# Patient Record
Sex: FEMALE | Race: BLACK OR AFRICAN AMERICAN | NOT HISPANIC OR LATINO | URBAN - NONMETROPOLITAN AREA
[De-identification: names, ages, dates, MRNs, and addresses within clinical notes are randomized per-mention and may not be internally consistent; named-entity substitution may affect disease eponyms.]

---

## 2019-03-07 ENCOUNTER — CLINICAL SUPPORT (OUTPATIENT)
Dept: FAMILY MEDICINE CLINIC | Facility: CLINIC | Age: 24
End: 2019-03-07

## 2019-03-07 VITALS
DIASTOLIC BLOOD PRESSURE: 88 MMHG | WEIGHT: 280 LBS | TEMPERATURE: 98.4 F | SYSTOLIC BLOOD PRESSURE: 130 MMHG | BODY MASS INDEX: 37.93 KG/M2 | OXYGEN SATURATION: 99 % | HEART RATE: 90 BPM | HEIGHT: 72 IN

## 2019-03-07 DIAGNOSIS — Z02.89 ENCOUNTER FOR EXAMINATION REQUIRED BY DEPARTMENT OF TRANSPORTATION (DOT): Primary | ICD-10-CM

## 2019-03-07 PROCEDURE — DOTPHY: Performed by: NURSE PRACTITIONER

## 2019-03-07 NOTE — PROGRESS NOTES
Yazmin Napier is a 23 y.o. female who presents to the office for a DOT Exam. See Federal DOT examination form for details of this visit.

## 2020-08-04 ENCOUNTER — HOSPITAL ENCOUNTER (INPATIENT)
Age: 25
LOS: 3 days | Discharge: HOME | DRG: 751 | End: 2020-08-08
Attending: EMERGENCY MEDICINE | Admitting: PSYCHIATRY & NEUROLOGY
Payer: MEDICAID

## 2020-08-04 DIAGNOSIS — F32.A DEPRESSION, UNSPECIFIED DEPRESSION TYPE: Primary | ICD-10-CM

## 2020-08-04 LAB
ALBUMIN SERPL-MCNC: 3.5 G/DL (ref 3.5–5)
ALBUMIN/GLOB SERPL: 0.8 {RATIO} (ref 1.1–2.2)
ALP SERPL-CCNC: 84 U/L (ref 45–117)
ALT SERPL-CCNC: 22 U/L (ref 12–78)
AMPHET UR QL SCN: NEGATIVE
ANION GAP SERPL CALC-SCNC: 10 MMOL/L (ref 5–15)
APAP SERPL-MCNC: <2 UG/ML (ref 10–30)
APPEARANCE UR: CLEAR
AST SERPL-CCNC: 16 U/L (ref 15–37)
BACTERIA URNS QL MICRO: NEGATIVE /HPF
BARBITURATES UR QL SCN: NEGATIVE
BASOPHILS # BLD: 0 K/UL (ref 0–0.1)
BASOPHILS NFR BLD: 1 % (ref 0–1)
BENZODIAZ UR QL: NEGATIVE
BILIRUB SERPL-MCNC: 0.5 MG/DL (ref 0.2–1)
BILIRUB UR QL: NEGATIVE
BUN SERPL-MCNC: 9 MG/DL (ref 6–20)
BUN/CREAT SERPL: 11 (ref 12–20)
CALCIUM SERPL-MCNC: 9 MG/DL (ref 8.5–10.1)
CANNABINOIDS UR QL SCN: POSITIVE
CHLORIDE SERPL-SCNC: 108 MMOL/L (ref 97–108)
CO2 SERPL-SCNC: 22 MMOL/L (ref 21–32)
COCAINE UR QL SCN: NEGATIVE
COLOR UR: NORMAL
CREAT SERPL-MCNC: 0.79 MG/DL (ref 0.55–1.02)
DIFFERENTIAL METHOD BLD: ABNORMAL
DRUG SCRN COMMENT,DRGCM: ABNORMAL
EOSINOPHIL # BLD: 0.2 K/UL (ref 0–0.4)
EOSINOPHIL NFR BLD: 2 % (ref 0–7)
EPITH CASTS URNS QL MICRO: NORMAL /LPF
ERYTHROCYTE [DISTWIDTH] IN BLOOD BY AUTOMATED COUNT: 17.7 % (ref 11.5–14.5)
ETHANOL SERPL-MCNC: <10 MG/DL
GLOBULIN SER CALC-MCNC: 4.4 G/DL (ref 2–4)
GLUCOSE SERPL-MCNC: 147 MG/DL (ref 65–100)
GLUCOSE UR STRIP.AUTO-MCNC: NEGATIVE MG/DL
HCG UR QL: NEGATIVE
HCT VFR BLD AUTO: 36.9 % (ref 35–47)
HGB BLD-MCNC: 11 G/DL (ref 11.5–16)
HGB UR QL STRIP: NEGATIVE
HYALINE CASTS URNS QL MICRO: NORMAL /LPF (ref 0–5)
IMM GRANULOCYTES # BLD AUTO: 0 K/UL (ref 0–0.04)
IMM GRANULOCYTES NFR BLD AUTO: 0 % (ref 0–0.5)
KETONES UR QL STRIP.AUTO: NEGATIVE MG/DL
LEUKOCYTE ESTERASE UR QL STRIP.AUTO: NEGATIVE
LYMPHOCYTES # BLD: 2.9 K/UL (ref 0.8–3.5)
LYMPHOCYTES NFR BLD: 35 % (ref 12–49)
MCH RBC QN AUTO: 21.2 PG (ref 26–34)
MCHC RBC AUTO-ENTMCNC: 29.8 G/DL (ref 30–36.5)
MCV RBC AUTO: 71.2 FL (ref 80–99)
METHADONE UR QL: NEGATIVE
MONOCYTES # BLD: 0.4 K/UL (ref 0–1)
MONOCYTES NFR BLD: 5 % (ref 5–13)
NEUTS SEG # BLD: 4.8 K/UL (ref 1.8–8)
NEUTS SEG NFR BLD: 57 % (ref 32–75)
NITRITE UR QL STRIP.AUTO: NEGATIVE
NRBC # BLD: 0 K/UL (ref 0–0.01)
NRBC BLD-RTO: 0 PER 100 WBC
OPIATES UR QL: NEGATIVE
PCP UR QL: NEGATIVE
PH UR STRIP: 5 [PH] (ref 5–8)
PLATELET # BLD AUTO: 234 K/UL (ref 150–400)
POTASSIUM SERPL-SCNC: 3.6 MMOL/L (ref 3.5–5.1)
PROT SERPL-MCNC: 7.9 G/DL (ref 6.4–8.2)
PROT UR STRIP-MCNC: NEGATIVE MG/DL
RBC # BLD AUTO: 5.18 M/UL (ref 3.8–5.2)
RBC #/AREA URNS HPF: NORMAL /HPF (ref 0–5)
SALICYLATES SERPL-MCNC: <1.7 MG/DL (ref 2.8–20)
SODIUM SERPL-SCNC: 140 MMOL/L (ref 136–145)
SP GR UR REFRACTOMETRY: 1.02 (ref 1–1.03)
UR CULT HOLD, URHOLD: NORMAL
UROBILINOGEN UR QL STRIP.AUTO: 0.2 EU/DL (ref 0.2–1)
WBC # BLD AUTO: 8.4 K/UL (ref 3.6–11)
WBC URNS QL MICRO: NORMAL /HPF (ref 0–4)

## 2020-08-04 PROCEDURE — 36415 COLL VENOUS BLD VENIPUNCTURE: CPT

## 2020-08-04 PROCEDURE — 80053 COMPREHEN METABOLIC PANEL: CPT

## 2020-08-04 PROCEDURE — 85025 COMPLETE CBC W/AUTO DIFF WBC: CPT

## 2020-08-04 PROCEDURE — 81001 URINALYSIS AUTO W/SCOPE: CPT

## 2020-08-04 PROCEDURE — 99282 EMERGENCY DEPT VISIT SF MDM: CPT

## 2020-08-04 PROCEDURE — 80307 DRUG TEST PRSMV CHEM ANLYZR: CPT

## 2020-08-04 PROCEDURE — 81025 URINE PREGNANCY TEST: CPT

## 2020-08-04 NOTE — ED TRIAGE NOTES
Arrives with cc of thoughts of SI with no plan. Arrives accompanied with her mother. States she suffered from depression during her pregnancy and is concerned for post partum depression. Has 8 mo old. Denies HI. Denies wanting to harm her baby. Voluntary. Contracts to safety while in ED. Hx of 2 SI attempt with OD 2009 and 2013    Hx of depression in High school and took Celexa, Risperdal and adderall. currently not on any medications.

## 2020-08-04 NOTE — ED PROVIDER NOTES
31year old female presenting for depression. Pt notes that she had her son 5 months ago. \"I guess that I'm just going through postpartum, I had depression before, I think it's a combination of the two. \"  Called her GYN today and was written for a prescription for celexa and was referred to the ED. Denies any plan. Vague suicidal thoughts. PMHx: ADHD, depression  PSx: c-secion x 1, I&D of  incision  Social: denies drug use or alcohol use.  + tobacco and marijuan use. The history is provided by the patient. Mental Health Problem           Past Medical History:   Diagnosis Date    ADHD (attention deficit hyperactivity disorder)     Depression     Mood disorder ([facility] [address]     Second hand smoke exposure        Past Surgical History:   Procedure Laterality Date    HX GYN           History reviewed. No pertinent family history.     Social History     Socioeconomic History    Marital status: SINGLE     Spouse name: Not on file    Number of children: Not on file    Years of education: Not on file    Highest education level: Not on file   Occupational History    Not on file   Social Needs    Financial resource strain: Not on file    Food insecurity     Worry: Not on file     Inability: Not on file    Transportation needs     Medical: Not on file     Non-medical: Not on file   Tobacco Use    Smoking status: Current Every Day Smoker     Packs/day: 0.25    Smokeless tobacco: Never Used   Substance and Sexual Activity    Alcohol use: Not Currently     Comment: socially    Drug use: Yes     Types: Marijuana    Sexual activity: Yes   Lifestyle    Physical activity     Days per week: Not on file     Minutes per session: Not on file    Stress: Not on file   Relationships    Social connections     Talks on phone: Not on file     Gets together: Not on file     Attends Shinto service: Not on file     Active member of club or organization: Not on file     Attends meetings of clubs or organizations: Not on file     Relationship status: Not on file    Intimate partner violence     Fear of current or ex partner: Not on file     Emotionally abused: Not on file     Physically abused: Not on file     Forced sexual activity: Not on file   Other Topics Concern    Not on file   Social History Narrative    Not on file         ALLERGIES: Patient has no known allergies. Review of Systems   Constitutional: Negative for fever. HENT: Negative for facial swelling. Respiratory: Negative for shortness of breath. Cardiovascular: Negative for chest pain. Gastrointestinal: Negative for vomiting. Skin: Negative for wound. Neurological: Negative for syncope. Psychiatric/Behavioral: Positive for dysphoric mood and suicidal ideas. All other systems reviewed and are negative. Vitals:    08/04/20 1830   BP: 120/65   Pulse: 85   Resp: 16   Temp: 98.5 °F (36.9 °C)   SpO2: 99%   Weight: 136.5 kg (300 lb 14.9 oz)   Height: 6' (1.829 m)            Physical Exam  Vitals signs and nursing note reviewed. Constitutional:       General: She is not in acute distress. Appearance: She is well-developed. Comments: Pleasant black female in no distress   HENT:      Right Ear: External ear normal.      Left Ear: External ear normal.   Eyes:      Pupils: Pupils are equal, round, and reactive to light. Neck:      Musculoskeletal: Normal range of motion and neck supple. Cardiovascular:      Rate and Rhythm: Normal rate and regular rhythm. Heart sounds: Normal heart sounds. No murmur. No friction rub. No gallop. Pulmonary:      Effort: Pulmonary effort is normal. No respiratory distress. Breath sounds: Normal breath sounds. No wheezing. Abdominal:      Palpations: Abdomen is soft. Tenderness: There is no abdominal tenderness. There is no guarding. Musculoskeletal: Normal range of motion. Skin:     General: Skin is warm and dry.    Neurological:      Mental Status: She is alert.          MDM  Number of Diagnoses or Management Options  Depression, unspecified depression type:   Diagnosis management comments: 58-year-old female history of depression, 5 months postpartum presenting to the ED for worsening depression with vague suicidal thoughts without plan. Patient seen by counselor, plan to admit patient to psychiatry. No medical complaints.        Amount and/or Complexity of Data Reviewed  Clinical lab tests: ordered and reviewed  Discuss the patient with other providers: yes (Dr. Bundy Sees ED attending)           Procedures

## 2020-08-04 NOTE — ED NOTES
YVETTE has spoken with pt. Per YVETTE, ARCELIA has prescribed Celexa today. Pt explained to pt that she has taken Celexa in the past which has caused her to be drowsy. Requesting a different medication.

## 2020-08-05 LAB
SARS-COV-2, COV2: NOT DETECTED
SOURCE, COVRS: NORMAL
SPECIMEN SOURCE, FCOV2M: NORMAL

## 2020-08-05 PROCEDURE — 65220000003 HC RM SEMIPRIVATE PSYCH

## 2020-08-05 PROCEDURE — 87635 SARS-COV-2 COVID-19 AMP PRB: CPT

## 2020-08-05 RX ORDER — ADHESIVE BANDAGE
30 BANDAGE TOPICAL DAILY PRN
Status: DISCONTINUED | OUTPATIENT
Start: 2020-08-05 | End: 2020-08-08 | Stop reason: HOSPADM

## 2020-08-05 RX ORDER — BENZTROPINE MESYLATE 1 MG/1
1 TABLET ORAL
Status: DISCONTINUED | OUTPATIENT
Start: 2020-08-05 | End: 2020-08-08 | Stop reason: HOSPADM

## 2020-08-05 RX ORDER — OLANZAPINE 5 MG/1
5 TABLET ORAL
Status: DISCONTINUED | OUTPATIENT
Start: 2020-08-05 | End: 2020-08-08 | Stop reason: HOSPADM

## 2020-08-05 RX ORDER — LORAZEPAM 2 MG/ML
1 INJECTION INTRAMUSCULAR
Status: DISCONTINUED | OUTPATIENT
Start: 2020-08-05 | End: 2020-08-08 | Stop reason: HOSPADM

## 2020-08-05 RX ORDER — ACETAMINOPHEN 325 MG/1
650 TABLET ORAL
Status: DISCONTINUED | OUTPATIENT
Start: 2020-08-05 | End: 2020-08-08 | Stop reason: HOSPADM

## 2020-08-05 RX ORDER — DIPHENHYDRAMINE HYDROCHLORIDE 50 MG/ML
50 INJECTION, SOLUTION INTRAMUSCULAR; INTRAVENOUS
Status: DISCONTINUED | OUTPATIENT
Start: 2020-08-05 | End: 2020-08-08 | Stop reason: HOSPADM

## 2020-08-05 RX ORDER — TRAZODONE HYDROCHLORIDE 50 MG/1
50 TABLET ORAL
Status: DISCONTINUED | OUTPATIENT
Start: 2020-08-05 | End: 2020-08-08 | Stop reason: HOSPADM

## 2020-08-05 RX ORDER — HYDROXYZINE 50 MG/1
50 TABLET, FILM COATED ORAL
Status: DISCONTINUED | OUTPATIENT
Start: 2020-08-05 | End: 2020-08-08 | Stop reason: HOSPADM

## 2020-08-05 RX ORDER — HALOPERIDOL 5 MG/ML
5 INJECTION INTRAMUSCULAR
Status: DISCONTINUED | OUTPATIENT
Start: 2020-08-05 | End: 2020-08-08 | Stop reason: HOSPADM

## 2020-08-05 NOTE — ROUTINE PROCESS
TRANSFER - OUT REPORT: 
 
Verbal report given to [facility] RN(name) on [address]  being transferred to 729(unit) for routine progression of care Report consisted of patients Situation, Background, Assessment and  
Recommendations(SBAR). Information from the following report(s) SBAR, Kardex, ED Summary and Recent Results was reviewed with the receiving nurse. Lines:    
 
Opportunity for questions and clarification was provided. Patient transported with: 
 Registered Nurse and HPD

## 2020-08-05 NOTE — BSMART NOTE
Pt is a 32 yo female with a 11 month old son named Sol Jacobson. Pt states her son is with the aunt and mother. Pt presented to the ER stating she is depressed/SI with no plan. Pt stated she \" feels like it would be better if she wasn't her\". Pt states she called her OB/GYN today, Dr. Mia Campbell, who prescribed Celexa for her. Pt states she did not fill the prescription. Pt states she does not want to take it because she has taken it before in high school and it made her drowsy. Pt states she has an appt at [facility] on August 27 with a psychiatrist.  This writer asked if she called the OB/GYN back to discuss and pt stated no, seemed like the doctor was too busy. Pt wanted MD to write for another antidepressant. After MD spoke to pt again, pt agreed to a voluntary admission in order to start a new medication. Lavern Bangura, SW Supervisee in Social Work

## 2020-08-05 NOTE — ED NOTES
Pt updated with plan of care. Notified that admission is pending and that she will be swabbed for covid 19. States understanding.

## 2020-08-05 NOTE — BH NOTES
TRANSFER - IN REPORT:    Verbal report received from Angie(name) on Pooja Sanchez  being received from ED(unit) for routine progression of care      Report consisted of patients Situation, Background, Assessment and   Recommendations(SBAR). Information from the following report(s) SBAR was reviewed with the receiving nurse. Opportunity for questions and clarification was provided. Assessment completed upon patients arrival to unit and care assumed.

## 2020-08-05 NOTE — BH NOTES
Family/friend brought in 9 underwear, 5 pants, 8 shirts, and a book. Clothing and book given to patient. Black gym bag with female products and ambulance linens placed in closet.

## 2020-08-05 NOTE — BH NOTES
PSYCHOSOCIAL ASSESSMENT  :Patient identifying info:  Tommy Arias is a 31 y.o., female admitted 8/4/2020  8:02 PM     Presenting problem and precipitating factors: Pt is admitted into the ER voluntarily for SI with no plan, and depression. She is concerned about post-partum depression. She is a mother to a 11 month old son, who is currently being taken care of by her aunt and mother. Currently, pt lives with mother and grandmother, but has been staying with aunt because she has air conditioning. Mental status assessment: Pt appears alert, oriented, calm and in a pleasant mood. Answered all questions confidently. Pt expressed feeling overwhelmed with motherhood. No SI present. Strengths: Pt came voluntarily to the ER, she has a stable home to return to, and family to take care of her child. Collateral information: Yariel Stuart, [phone]    Current psychiatric /substance abuse providers and contact info: TREVA Corrigan prescribed her Celexa. Appointment with [facility] on August 27 with a psychiatrist.     Previous psychiatric/substance abuse providers and response to treatment: Previous overdose hx, 2009 and 2013 at [facility]. Family history of mental illness or substance abuse: None noted. Substance abuse history: Positive for THC   Social History     Tobacco Use    Smoking status: Current Every Day Smoker     Packs/day: 0.25    Smokeless tobacco: Never Used   Substance Use Topics    Alcohol use: Not Currently     Comment: socially       History of biomedical complications associated with substance abuse: None noted. Patient's current acceptance of treatment or motivation for change: Pt agreed to voluntary admission. Family constellation: Pt has a 11 month old baby, is single, and lives with mother and grandmother. Aunt is also present in her life. Is significant other involved? No     Describe support system: Pt mother.      Describe living arrangements and home environment: Pt lives with mother and grandmother, but has been staying with aunt because she has air conditioning. Health issues:   Hospital Problems  Date Reviewed: 2015          Codes Class Noted POA    Depression complicating pregnancy, postpartum ICD-10-CM: O99.345, F53.0  ICD-9-CM: 648.44, 311  2020 Unknown              Trauma history: None noted. Legal issues: None noted. History of  service: None noted. Financial status: Unemployed, family supports her. Scientologist/cultural factors: None noted. Education/work history: Completed the 12th grade, high school.      Have you been licensed as a health care professional (current or ):     Leisure and recreation preferences: Unknown     Describe coping skills: ineffectual     Darien Vela  2020
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
## 2020-08-05 NOTE — ED NOTES
Report received from  [employer], [address] [place] [address]. Pt resting comfortably on chair watching tv. No changes in assessment noted since previous assessment.

## 2020-08-05 NOTE — ED NOTES
Assumed care of patient. Resting quietly. Woke easily as RN approached. Denies any medical complaints. Calm, cooperative. Awaiting COVID test results for patient to be admitted here at [facility].

## 2020-08-05 NOTE — ED NOTES
Pt has decided to stay. Provided with crackers, ginger ale and water per request. Offered hallway stretcher, pt declined, stating she'd be comfortable with an extra blanket and pillow. Sachse and pillow provided.

## 2020-08-05 NOTE — ED NOTES
Pt requesting to leave. Aware that covid swab needed for admission and that her swab has priority. PA notified.

## 2020-08-06 LAB
CHOLEST SERPL-MCNC: 169 MG/DL
HDLC SERPL-MCNC: 47 MG/DL
HDLC SERPL: 3.6 {RATIO} (ref 0–5)
LDLC SERPL CALC-MCNC: 101.4 MG/DL (ref 0–100)
LIPID PROFILE,FLP: ABNORMAL
TRIGL SERPL-MCNC: 103 MG/DL (ref ?–150)
TSH SERPL DL<=0.05 MIU/L-ACNC: 0.54 UIU/ML (ref 0.36–3.74)
VLDLC SERPL CALC-MCNC: 20.6 MG/DL

## 2020-08-06 PROCEDURE — 74011250637 HC RX REV CODE- 250/637: Performed by: NURSE PRACTITIONER

## 2020-08-06 PROCEDURE — 36415 COLL VENOUS BLD VENIPUNCTURE: CPT

## 2020-08-06 PROCEDURE — 65220000003 HC RM SEMIPRIVATE PSYCH

## 2020-08-06 PROCEDURE — 84443 ASSAY THYROID STIM HORMONE: CPT

## 2020-08-06 PROCEDURE — 80061 LIPID PANEL: CPT

## 2020-08-06 RX ORDER — BUPROPION HYDROCHLORIDE 100 MG/1
100 TABLET, EXTENDED RELEASE ORAL DAILY
Status: DISCONTINUED | OUTPATIENT
Start: 2020-08-06 | End: 2020-08-08 | Stop reason: HOSPADM

## 2020-08-06 RX ADMIN — BUPROPION HYDROCHLORIDE 100 MG: 100 TABLET, EXTENDED RELEASE ORAL at 11:23

## 2020-08-06 NOTE — PROGRESS NOTES
Problem: Discharge Planning  Goal: *Discharge to safe environment  Outcome: Progressing Towards Goal  Note: Pt has a safe home to return to and supportive family in and outside of the home. Goal: *Knowledge of medication management  Outcome: Progressing Towards Goal  Note: Pt agrees to take medication as prescribed. Goal: *Knowledge of discharge instructions  Outcome: Progressing Towards Goal  Note: Pt verbalized understanding of goals for tx and discharge.

## 2020-08-06 NOTE — H&P
[facility] HISTORY AND PHYSICAL    Name:  Sameer Hannon  MR#:  [[id number]]  :  1995  ACCOUNT #:  [de-identified]  ADMIT DATE:  2020    INITIAL PSYCHIATRIC EVALUATION    DATE OF SERVICE:  2020    CHIEF COMPLAINT:  \"I have been feeling really depressed. \"    HISTORY OF PRESENT ILLNESS:  The patient is a 58-year-old female who is currently admitted at [address] on a voluntary basis. She stated she has suffered from depression back when she was in high school. She was put on Celexa, Adderall and risperidone. She shared that she was admitted at [facility] twice after she overdosed on both occasions. She spoke to her OB/GYN a few days ago, and she was prescribed Celexa, but she never had a chance to pick it up since her OB recommended for her to be evaluated in the ER. The patient states that her depression back in high school had resolved, but then she started feeling depressed after the birth of her 11month-old son. She shares that she had a traumatic childbirth. She had to go into emergency . She also had to go back to the hospital due to infection in her incision site. She states she has been overwhelmed with psychosocial stressors, trying to become a mother, trying to get a job and trying to support her son. Her motivation has been poor, and her appetite has increased. She states that she was feeling hopeless and helpless to the point that she had developed passive thoughts of suicide. She shares that she would not care if she was not here anymore. Her urine drug screen is positive for THC. She states she smokes THC on weekends. She is supposed to have an appointment with [facility] in 6 months. She currently denies suicidal ideation, homicidal ideation, auditory or visual hallucination.     PAST MEDICAL HISTORY:  See H and P.    Labs: (reviewed/updated 2020)  Patient Vitals for the past 8 hrs: BP Temp Pulse Resp SpO2   08/07/20 0754 120/85 98.6 °F (37 °C) 76 16 97 %     Labs Reviewed   DRUG SCREEN, URINE - Abnormal; Notable for the following components:       Result Value    THC (TH-CANNABINOL) Positive (*)     All other components within normal limits   SALICYLATE - Abnormal; Notable for the following components:    Salicylate level <8.7 (*)     All other components within normal limits   ACETAMINOPHEN - Abnormal; Notable for the following components:    Acetaminophen level <2 (*)     All other components within normal limits   METABOLIC PANEL, COMPREHENSIVE - Abnormal; Notable for the following components:    Glucose 147 (*)     BUN/Creatinine ratio 11 (*)     Globulin 4.4 (*)     A-G Ratio 0.8 (*)     All other components within normal limits   CBC WITH AUTOMATED DIFF - Abnormal; Notable for the following components:    HGB 11.0 (*)     MCV 71.2 (*)     MCH 21.2 (*)     MCHC 29.8 (*)     RDW 17.7 (*)     All other components within normal limits   LIPID PANEL - Abnormal; Notable for the following components:    LDL, calculated 101.4 (*)     All other components within normal limits   URINE CULTURE HOLD SAMPLE   URINALYSIS W/MICROSCOPIC   ETHYL ALCOHOL   SARS-COV-2   TSH 3RD GENERATION   HCG URINE, QL. - POC     Lab Results   Component Value Date/Time    Sodium 140 08/04/2020 09:47 PM    Potassium 3.6 08/04/2020 09:47 PM    Chloride 108 08/04/2020 09:47 PM    CO2 22 08/04/2020 09:47 PM    Anion gap 10 08/04/2020 09:47 PM    Glucose 147 (H) 08/04/2020 09:47 PM    BUN 9 08/04/2020 09:47 PM    Creatinine 0.79 08/04/2020 09:47 PM    BUN/Creatinine ratio 11 (L) 08/04/2020 09:47 PM    GFR est AA >60 08/04/2020 09:47 PM    GFR est non-AA >60 08/04/2020 09:47 PM    Calcium 9.0 08/04/2020 09:47 PM    Bilirubin, total 0.5 08/04/2020 09:47 PM    Alk.  phosphatase 84 08/04/2020 09:47 PM    Protein, total 7.9 08/04/2020 09:47 PM    Albumin 3.5 08/04/2020 09:47 PM    Globulin 4.4 (H) 08/04/2020 09:47 PM    A-G Ratio 0.8 (L) 08/04/2020 09:47 PM    ALT (SGPT) 22 08/04/2020 09:47 PM     Admission on 08/04/2020   Component Date Value Ref Range Status    Pregnancy test,urine (POC) 08/04/2020 Negative  NEG   Final    Urine culture hold 08/04/2020 Urine on hold in Microbiology dept for 2 days. If unpreserved urine is submitted, it cannot be used for addtional testing after 24 hours, recollection will be required.     Final    Color 08/04/2020 YELLOW/STRAW    Final    Appearance 08/04/2020 CLEAR  CLEAR   Final    Specific gravity 08/04/2020 1.017  1.003 - 1.030   Final    pH (UA) 08/04/2020 5.0  5.0 - 8.0   Final    Protein 08/04/2020 Negative  NEG mg/dL Final    Glucose 08/04/2020 Negative  NEG mg/dL Final    Ketone 08/04/2020 Negative  NEG mg/dL Final    Bilirubin 08/04/2020 Negative  NEG   Final    Blood 08/04/2020 Negative  NEG   Final    Urobilinogen 08/04/2020 0.2  0.2 - 1.0 EU/dL Final    Nitrites 08/04/2020 Negative  NEG   Final    Leukocyte Esterase 08/04/2020 Negative  NEG   Final    WBC 08/04/2020 0-4  0 - 4 /hpf Final    RBC 08/04/2020 0-5  0 - 5 /hpf Final    Epithelial cells 08/04/2020 FEW  FEW /lpf Final    Bacteria 08/04/2020 Negative  NEG /hpf Final    Hyaline cast 08/04/2020 0-2  0 - 5 /lpf Final    AMPHETAMINES 08/04/2020 Negative  NEG   Final    BARBITURATES 08/04/2020 Negative  NEG   Final    BENZODIAZEPINES 08/04/2020 Negative  NEG   Final    COCAINE 08/04/2020 Negative  NEG   Final    METHADONE 08/04/2020 Negative  NEG   Final    OPIATES 08/04/2020 Negative  NEG   Final    PCP(PHENCYCLIDINE) 08/04/2020 Negative  NEG   Final    THC (TH-CANNABINOL) 08/04/2020 Positive* NEG   Final    Drug screen comment 08/04/2020 (NOTE)   Final    Salicylate level 91/24/3907 <1.7* 2.8 - 20.0 MG/DL Final    Acetaminophen level 08/04/2020 <2* 10 - 30 ug/mL Final    ALCOHOL(ETHYL),SERUM 08/04/2020 <10  <10 MG/DL Final    Sodium 08/04/2020 140  136 - 145 mmol/L Final    Potassium 08/04/2020 3.6  3.5 - 5.1 mmol/L Final    Chloride 08/04/2020 108  97 - 108 mmol/L Final    CO2 08/04/2020 22  21 - 32 mmol/L Final    Anion gap 08/04/2020 10  5 - 15 mmol/L Final    Glucose 08/04/2020 147* 65 - 100 mg/dL Final    BUN 08/04/2020 9  6 - 20 MG/DL Final    Creatinine 08/04/2020 0.79  0.55 - 1.02 MG/DL Final    BUN/Creatinine ratio 08/04/2020 11* 12 - 20   Final    GFR est AA 08/04/2020 >60  >60 ml/min/1.73m2 Final    GFR est non-AA 08/04/2020 >60  >60 ml/min/1.73m2 Final    Calcium 08/04/2020 9.0  8.5 - 10.1 MG/DL Final    Bilirubin, total 08/04/2020 0.5  0.2 - 1.0 MG/DL Final    ALT (SGPT) 08/04/2020 22  12 - 78 U/L Final    AST (SGOT) 08/04/2020 16  15 - 37 U/L Final    Alk. phosphatase 08/04/2020 84  45 - 117 U/L Final    Protein, total 08/04/2020 7.9  6.4 - 8.2 g/dL Final    Albumin 08/04/2020 3.5  3.5 - 5.0 g/dL Final    Globulin 08/04/2020 4.4* 2.0 - 4.0 g/dL Final    A-G Ratio 08/04/2020 0.8* 1.1 - 2.2   Final    WBC 08/04/2020 8.4  3.6 - 11.0 K/uL Final    RBC 08/04/2020 5.18  3.80 - 5.20 M/uL Final    HGB 08/04/2020 11.0* 11.5 - 16.0 g/dL Final    HCT 08/04/2020 36.9  35.0 - 47.0 % Final    MCV 08/04/2020 71.2* 80.0 - 99.0 FL Final    MCH 08/04/2020 21.2* 26.0 - 34.0 PG Final    MCHC 08/04/2020 29.8* 30.0 - 36.5 g/dL Final    RDW 08/04/2020 17.7* 11.5 - 14.5 % Final    PLATELET 39/50/5884 405  150 - 400 K/uL Final    NRBC 08/04/2020 0.0  0  WBC Final    ABSOLUTE NRBC 08/04/2020 0.00  0.00 - 0.01 K/uL Final    NEUTROPHILS 08/04/2020 57  32 - 75 % Final    LYMPHOCYTES 08/04/2020 35  12 - 49 % Final    MONOCYTES 08/04/2020 5  5 - 13 % Final    EOSINOPHILS 08/04/2020 2  0 - 7 % Final    BASOPHILS 08/04/2020 1  0 - 1 % Final    IMMATURE GRANULOCYTES 08/04/2020 0  0.0 - 0.5 % Final    ABS. NEUTROPHILS 08/04/2020 4.8  1.8 - 8.0 K/UL Final    ABS. LYMPHOCYTES 08/04/2020 2.9  0.8 - 3.5 K/UL Final    ABS. MONOCYTES 08/04/2020 0.4  0.0 - 1.0 K/UL Final    ABS.  EOSINOPHILS 08/04/2020 0.2  0.0 - 0.4 K/UL Final    ABS. BASOPHILS 08/04/2020 0.0  0.0 - 0.1 K/UL Final    ABS. IMM. GRANS. 08/04/2020 0.0  0.00 - 0.04 K/UL Final    DF 08/04/2020 AUTOMATED    Final    Specimen source 08/05/2020 Nasopharyngeal    Final    SARS-CoV-2 08/05/2020 Not detected  NOTD   Final    Specimen source 08/05/2020 Nasopharyngeal    Final    TSH 08/06/2020 0.54  0.36 - 3.74 uIU/mL Final    LIPID PROFILE 08/06/2020        Final    Cholesterol, total 08/06/2020 169  <200 MG/DL Final    Triglyceride 08/06/2020 103  <150 MG/DL Final    HDL Cholesterol 08/06/2020 47  MG/DL Final    LDL, calculated 08/06/2020 101.4* 0 - 100 MG/DL Final    VLDL, calculated 08/06/2020 20.6  MG/DL Final    CHOL/HDL Ratio 08/06/2020 3.6  0.0 - 5.0   Final     Vitals:    08/06/20 1124 08/06/20 1711 08/06/20 1954 08/07/20 0754   BP: 107/76 115/80 112/81 120/85   Pulse: 86 90 85 76   Resp: 16 16 16 16   Temp: 98.1 °F (36.7 °C) 98 °F (36.7 °C) 98 °F (36.7 °C) 98.6 °F (37 °C)   SpO2: 98% 100% 100% 97%   Weight:       Height:         No results found for this or any previous visit (from the past 24 hour(s)). RADIOLOGY REPORTS:  Results from Hospital Encounter encounter on 02/16/14   XR CHEST PA LAT    Narrative **Final Report**       ICD Codes / Adm. Diagnosis: 163633   / Chest Pain    Examination:  CR CHEST PA AND LATERAL  - [id number] - Feb 16 2014  6:49PM  Accession No:  [id number]  Reason:  chest pain      REPORT:  EXAM:  CR CHEST PA AND LATERAL. INDICATION: Chest pain. COMPARISON: None. FINDINGS:  PA and lateral radiographs of the chest were obtained. Lungs: The lungs are clear of mass, nodule, airspace disease or edema. Pleura: There is no pleural effusion or pneumothorax. Mediastinum: The cardiac and mediastinal contours and pulmonary vascularity   are normal.    Bones and soft tissues: The bones and soft tissues are within normal limits.         IMPRESSION: Normal chest.              Signing/Reading Doctor: Cristine Medellin ([id number])    Approved: Cristine Medellin ([id number])  Feb 16 2014  6:56PM                               No results found. PAST PSYCHIATRIC HISTORY:  See above. PSYCHOSOCIAL HISTORY:  She is single. She has a 11month-old son. She lives with her aunt. She is currently unemployed, and her family is financially supporting her. MENTAL STATUS EXAM:  She is alert and oriented in all spheres. She is dressed in hospital apparel. She is calm and pleasant during the interview. She reports her mood is good. Affect is reactive. Speech normal rate and rhythm. Thought process logical and goal-directed. She denies suicidal ideation, homicidal ideation, auditory or visual hallucinations. Memory seems intact. Intelligence seems average. Insight is partial.  Judgment is fair. DIAGNOSES:  Major depressive disorder, recurrent, moderate, without psychotic features. Cannabis use disorder, unspecified. TREATMENT PLAN:  I will continue her inpatient stay. She will be provided with support and encouraged to attend groups. Her safety will be monitored. Her medications will be modified and assessed. Case Management will work on discharge planning. ASSETS AND STRENGTHS:  She is willing to seek help. She is willing to take medications. ESTIMATED LENGTH OF STAY:  5-7 days.       ANTOINETTE NEAL NP      [id number]  D:  08/06/2020 13:30  T:  08/06/2020 15:09  JOB #:  [id number]

## 2020-08-06 NOTE — PROGRESS NOTES
Pt in bed resting quietly with eyes closed. NAD. Respirations equal and unlabored. Will continue to monitor throughout shift q15 minute rounds. Problem: Depressed Mood (Adult/Pediatric)  Goal: *STG: Remains safe in hospital  Outcome: Progressing Towards Goal     Problem: Falls - Risk of  Goal: *Absence of Falls  Description: Document Neftali Fall Risk and appropriate interventions in the flowsheet.   Outcome: Progressing Towards Goal  Note: Fall Risk Interventions:            Medication Interventions: Teach patient to arise slowly

## 2020-08-06 NOTE — PROGRESS NOTES
[facility]  Master Treatment Plan for Zane Litten    Date Treatment Plan Initiated: 8/6/20    Treatment Plan Modalities:  Type of Modality Amount  (x minutes) Frequency (x/week) Duration (x days) Name of Responsible Staff   Community & wrap-up meetings to encourage peer interactions 15 7 1 Sylwia JARVIS     Group psychotherapy to assist in building coping skills and internal controls 60 7 1 Clark Ramos   Therapeutic activity groups to build coping skills 60 7 1 Clark Ramos   Psychoeducation in group setting to address:   Medication education   15 7 1 [facility] skills         Relaxation techniques         Symptom management         Discharge planning   61 2 [facility]    60 2 1 Chaplain JOHN   61 1 1 volunteer   Recovery/AA/NA      volunteer   Physician medication management   13 7 [address] NP   Family meeting/discharge planning   15 2 1 [address] and Selina Guzman                                   Problem: Depressed Mood (Adult/Pediatric)  Goal: *STG: Participates in treatment plan  Outcome: Progressing Towards Goal  Note: Out on unit passively engaged. Mood and affect depressed and sad. Denies SI. Describes hopelessness. Describes social stressors related to lack of finances and primary caregiver of 5 month of infant. Daily goal is to attend group and talk with family.  Staff focus is on medication and coping skills education  Goal: *STG: Verbalizes anger, guilt, and other feelings in a constructive manor  Outcome: Progressing Towards Goal  Goal: *STG: Attends activities and groups  Outcome: Progressing Towards Goal  Goal: *STG: Demonstrates reduction in symptoms and increase in insight into coping skills/future focused  Outcome: Progressing Towards Goal  Goal: *STG: Complies with medication therapy  Outcome: Progressing Towards Goal  Goal: Interventions  Outcome: Progressing Towards Goal

## 2020-08-06 NOTE — INTERDISCIPLINARY ROUNDS
Behavioral Health Interdisciplinary Rounds Patient Name: Adam Hilton  Age: 31 y.o. Room/Bed:  729/ Primary Diagnosis: <principal problem not specified> Admission Status: Voluntary Readmission within 30 days: no 
Power of  in place: no 
Patient requires a blocked bed: no          Reason for blocked bed: VTE Prophylaxis: No 
 
Mobility needs/Fall risk: no 
Flu Vaccine : no  
Nutritional Plan: no 
Consults:         
Labs/Testing due today?: yes Sleep hours: 7.5 Participation in Care/Groups:  
Medication Compliant?: No scheduled medications PRNS (last 24 hours): None Restraints (last 24 hours):  no 
  
CIWA (range last 24 hours): COWS (range last 24 hours): Alcohol screening (AUDIT) completed -   AUDIT Score: 4 If applicable, date SBIRT discussed in treatment team AND documented:  
AUDIT Screen Score: AUDIT Score: 4 Document Brief Intervention (corresponds directly with the 5 A's, Ask, Advise, Assess, Assist, and Arrange): At- Risk Patients (Score 7-15 for women; 8-15 for men) Discuss concern patient is drinking at unhealthy levels known to increase risk of alcohol-related health problems. Is Patient ready to commit to change? If No: 
? Encourage reflection ? Discuss short term and long term health risks of consuming alcohol ? Barriers to change ? Reaffirm willingness to help / Educational materials provided If Yes: 
? Set goal 
? Plan 
? Educational materials provided Harmful use or Dependence (Score 16 or greater) ? Discuss short term and long term health risks of consuming alcohol ? Recommendations ? Negotiate drinking goal 
? Recommend addiction specialist/center ? Arrange follow-up appointments. Tobacco - patient is a smoker: Have You Used Tobacco in the Past 30 Days: Yes Illegal Drugs use: Have You Used Any Illegal Substances Over the Past 12 Months: Yes 
 
24 hour chart check complete: Patient goal(s) for today: meet treatment team, acclimate to unit Treatment team focus/goals: assess needs for treatment and safe discharge. Progress note:  Pt appears alert, oriented and calm. No SI present. Slightly improving. LOS:  1  Expected LOS:  
 
Financial concerns/prescription coverage:  
Family contact:  barrettne Jackelyn Montano [address], [phone] Family requesting physician contact today:  No  
Discharge plan: TBD, pt will return home to son, potential discharge Saturday. Access to weapons : No     
Outpatient provider(s): [facility]? Patient's preferred phone number for follow up call: [phone] Participating treatment team members: RENALDO Diaz; Aisha Heredia RN; Daniel Worthington NP.

## 2020-08-07 PROCEDURE — 65220000003 HC RM SEMIPRIVATE PSYCH

## 2020-08-07 PROCEDURE — 74011250637 HC RX REV CODE- 250/637: Performed by: NURSE PRACTITIONER

## 2020-08-07 RX ORDER — BUPROPION HYDROCHLORIDE 150 MG/1
150 TABLET ORAL DAILY
Qty: 30 TAB | Refills: 0 | Status: ON HOLD | OUTPATIENT
Start: 2020-08-07 | End: 2021-03-24

## 2020-08-07 RX ADMIN — BUPROPION HYDROCHLORIDE 100 MG: 100 TABLET, EXTENDED RELEASE ORAL at 08:38

## 2020-08-07 NOTE — PROGRESS NOTES
Problem: Depressed Mood (Adult/Pediatric)  Goal: *STG: Participates in treatment plan  Outcome: Progressing Towards Goal  Note: Patient participates in treatment plan. Patient is isolative, blunted affect, med and meal compliant, did not attend group. Patient is cooperative, medications discussed.

## 2020-08-07 NOTE — PROGRESS NOTES
Problem: Falls - Risk of  Goal: *Absence of Falls  Description: Document Lucerolynn Frye Fall Risk and appropriate interventions in the flowsheet.   Outcome: Progressing Towards Goal  Note: Fall Risk Interventions:            Medication Interventions: Teach patient to arise slowly

## 2020-08-07 NOTE — DISCHARGE SUMMARY
PSYCHIATRIC DISCHARGE SUMMARY      Patient: Jeffrey Sotomayor MRN: [de-identified]  SSN: [id number]    YOB: 1995  Age: 31 y.o. Sex: female        Date of Admission: 2020  Date of Discharge: 2020     Type of Discharge:  REGULAR    Admission data:  CHIEF COMPLAINT:  \"I have been feeling really depressed. \"     HISTORY OF PRESENT ILLNESS:  The patient is a 18-year-old female who is currently admitted at [address] on a voluntary basis. She stated she has suffered from depression back when she was in high school. She was put on Celexa, Adderall and risperidone. She shared that she was admitted at [facility] twice after she overdosed on both occasions. She spoke to her OB/GYN a few days ago, and she was prescribed Celexa, but she never had a chance to pick it up since her OB recommended for her to be evaluated in the ER. The patient states that her depression back in high school had resolved, but then she started feeling depressed after the birth of her 11month-old son. She shares that she had a traumatic childbirth. She had to go into emergency . She also had to go back to the hospital due to infection in her incision site. She states she has been overwhelmed with psychosocial stressors, trying to become a mother, trying to get a job and trying to support her son. Her motivation has been poor, and her appetite has increased. She states that she was feeling hopeless and helpless to the point that she had developed passive thoughts of suicide. She shares that she would not care if she was not here anymore. Her urine drug screen is positive for THC. She states she smokes THC on weekends. She is supposed to have an appointment with [address] [place] [address] in 6 months.   She currently denies suicidal ideation, homicidal ideation, auditory or visual hallucination.     PAST MEDICAL HISTORY:  See H and P.     Labs: (reviewed/updated 2020)  Patient Vitals for the past 8 hrs:    BP Temp Pulse Resp SpO2   08/07/20 0754 120/85 98.6 °F (37 °C) 76 16 97 %           Labs Reviewed   DRUG SCREEN, URINE - Abnormal; Notable for the following components:       Result Value      THC (TH-CANNABINOL) Positive (*)       All other components within normal limits   SALICYLATE - Abnormal; Notable for the following components:     Salicylate level <5.5 (*)       All other components within normal limits   ACETAMINOPHEN - Abnormal; Notable for the following components:     Acetaminophen level <2 (*)       All other components within normal limits   METABOLIC PANEL, COMPREHENSIVE - Abnormal; Notable for the following components:     Glucose 147 (*)       BUN/Creatinine ratio 11 (*)       Globulin 4.4 (*)       A-G Ratio 0.8 (*)       All other components within normal limits   CBC WITH AUTOMATED DIFF - Abnormal; Notable for the following components:     HGB 11.0 (*)       MCV 71.2 (*)       MCH 21.2 (*)       MCHC 29.8 (*)       RDW 17.7 (*)       All other components within normal limits   LIPID PANEL - Abnormal; Notable for the following components:     LDL, calculated 101.4 (*)       All other components within normal limits   URINE CULTURE HOLD SAMPLE   URINALYSIS W/MICROSCOPIC   ETHYL ALCOHOL   SARS-COV-2   TSH 3RD GENERATION   HCG URINE, QL. - POC           Lab Results   Component Value Date/Time     Sodium 140 08/04/2020 09:47 PM     Potassium 3.6 08/04/2020 09:47 PM     Chloride 108 08/04/2020 09:47 PM     CO2 22 08/04/2020 09:47 PM     Anion gap 10 08/04/2020 09:47 PM     Glucose 147 (H) 08/04/2020 09:47 PM     BUN 9 08/04/2020 09:47 PM     Creatinine 0.79 08/04/2020 09:47 PM     BUN/Creatinine ratio 11 (L) 08/04/2020 09:47 PM     GFR est AA >60 08/04/2020 09:47 PM     GFR est non-AA >60 08/04/2020 09:47 PM     Calcium 9.0 08/04/2020 09:47 PM     Bilirubin, total 0.5 08/04/2020 09:47 PM     Alk.  phosphatase 84 08/04/2020 09:47 PM     Protein, total 7.9 08/04/2020 09:47 PM   Albumin 3.5 08/04/2020 09:47 PM     Globulin 4.4 (H) 08/04/2020 09:47 PM     A-G Ratio 0.8 (L) 08/04/2020 09:47 PM     ALT (SGPT) 22 08/04/2020 09:47 PM             Admission on 08/04/2020   Component Date Value Ref Range Status    Pregnancy test,urine (POC) 08/04/2020 Negative  NEG   Final    Urine culture hold 08/04/2020 Urine on hold in Microbiology dept for 2 days. If unpreserved urine is submitted, it cannot be used for addtional testing after 24 hours, recollection will be required.     Final    Color 08/04/2020 YELLOW/STRAW    Final    Appearance 08/04/2020 CLEAR  CLEAR   Final    Specific gravity 08/04/2020 1.017  1.003 - 1.030   Final    pH (UA) 08/04/2020 5.0  5.0 - 8.0   Final    Protein 08/04/2020 Negative  NEG mg/dL Final    Glucose 08/04/2020 Negative  NEG mg/dL Final    Ketone 08/04/2020 Negative  NEG mg/dL Final    Bilirubin 08/04/2020 Negative  NEG   Final    Blood 08/04/2020 Negative  NEG   Final    Urobilinogen 08/04/2020 0.2  0.2 - 1.0 EU/dL Final    Nitrites 08/04/2020 Negative  NEG   Final    Leukocyte Esterase 08/04/2020 Negative  NEG   Final    WBC 08/04/2020 0-4  0 - 4 /hpf Final    RBC 08/04/2020 0-5  0 - 5 /hpf Final    Epithelial cells 08/04/2020 FEW  FEW /lpf Final    Bacteria 08/04/2020 Negative  NEG /hpf Final    Hyaline cast 08/04/2020 0-2  0 - 5 /lpf Final    AMPHETAMINES 08/04/2020 Negative  NEG   Final    BARBITURATES 08/04/2020 Negative  NEG   Final    BENZODIAZEPINES 08/04/2020 Negative  NEG   Final    COCAINE 08/04/2020 Negative  NEG   Final    METHADONE 08/04/2020 Negative  NEG   Final    OPIATES 08/04/2020 Negative  NEG   Final    PCP(PHENCYCLIDINE) 08/04/2020 Negative  NEG   Final    THC (TH-CANNABINOL) 08/04/2020 Positive* NEG   Final    Drug screen comment 08/04/2020 (NOTE)    Final    Salicylate level 55/19/3101 <1.7* 2.8 - 20.0 MG/DL Final    Acetaminophen level 08/04/2020 <2* 10 - 30 ug/mL Final    ALCOHOL(ETHYL),SERUM 08/04/2020 <10 <10 MG/DL Final    Sodium 08/04/2020 140  136 - 145 mmol/L Final    Potassium 08/04/2020 3.6  3.5 - 5.1 mmol/L Final    Chloride 08/04/2020 108  97 - 108 mmol/L Final    CO2 08/04/2020 22  21 - 32 mmol/L Final    Anion gap 08/04/2020 10  5 - 15 mmol/L Final    Glucose 08/04/2020 147* 65 - 100 mg/dL Final    BUN 08/04/2020 9  6 - 20 MG/DL Final    Creatinine 08/04/2020 0.79  0.55 - 1.02 MG/DL Final    BUN/Creatinine ratio 08/04/2020 11* 12 - 20   Final    GFR est AA 08/04/2020 >60  >60 ml/min/1.73m2 Final    GFR est non-AA 08/04/2020 >60  >60 ml/min/1.73m2 Final    Calcium 08/04/2020 9.0  8.5 - 10.1 MG/DL Final    Bilirubin, total 08/04/2020 0.5  0.2 - 1.0 MG/DL Final    ALT (SGPT) 08/04/2020 22  12 - 78 U/L Final    AST (SGOT) 08/04/2020 16  15 - 37 U/L Final    Alk. phosphatase 08/04/2020 84  45 - 117 U/L Final    Protein, total 08/04/2020 7.9  6.4 - 8.2 g/dL Final    Albumin 08/04/2020 3.5  3.5 - 5.0 g/dL Final    Globulin 08/04/2020 4.4* 2.0 - 4.0 g/dL Final    A-G Ratio 08/04/2020 0.8* 1.1 - 2.2   Final    WBC 08/04/2020 8.4  3.6 - 11.0 K/uL Final    RBC 08/04/2020 5.18  3.80 - 5.20 M/uL Final    HGB 08/04/2020 11.0* 11.5 - 16.0 g/dL Final    HCT 08/04/2020 36.9  35.0 - 47.0 % Final    MCV 08/04/2020 71.2* 80.0 - 99.0 FL Final    MCH 08/04/2020 21.2* 26.0 - 34.0 PG Final    MCHC 08/04/2020 29.8* 30.0 - 36.5 g/dL Final    RDW 08/04/2020 17.7* 11.5 - 14.5 % Final    PLATELET 97/88/9970 629  150 - 400 K/uL Final    NRBC 08/04/2020 0.0  0  WBC Final    ABSOLUTE NRBC 08/04/2020 0.00  0.00 - 0.01 K/uL Final    NEUTROPHILS 08/04/2020 57  32 - 75 % Final    LYMPHOCYTES 08/04/2020 35  12 - 49 % Final    MONOCYTES 08/04/2020 5  5 - 13 % Final    EOSINOPHILS 08/04/2020 2  0 - 7 % Final    BASOPHILS 08/04/2020 1  0 - 1 % Final    IMMATURE GRANULOCYTES 08/04/2020 0  0.0 - 0.5 % Final    ABS. NEUTROPHILS 08/04/2020 4.8  1.8 - 8.0 K/UL Final    ABS.  LYMPHOCYTES 08/04/2020 2.9  0.8 - 3.5 K/UL Final    ABS. MONOCYTES 08/04/2020 0.4  0.0 - 1.0 K/UL Final    ABS. EOSINOPHILS 08/04/2020 0.2  0.0 - 0.4 K/UL Final    ABS. BASOPHILS 08/04/2020 0.0  0.0 - 0.1 K/UL Final    ABS. IMM. GRANS. 08/04/2020 0.0  0.00 - 0.04 K/UL Final    DF 08/04/2020 AUTOMATED    Final    Specimen source 08/05/2020 Nasopharyngeal    Final    SARS-CoV-2 08/05/2020 Not detected  NOTD   Final    Specimen source 08/05/2020 Nasopharyngeal    Final    TSH 08/06/2020 0.54  0.36 - 3.74 uIU/mL Final    LIPID PROFILE 08/06/2020        Final    Cholesterol, total 08/06/2020 169  <200 MG/DL Final    Triglyceride 08/06/2020 103  <150 MG/DL Final    HDL Cholesterol 08/06/2020 47  MG/DL Final    LDL, calculated 08/06/2020 101.4* 0 - 100 MG/DL Final    VLDL, calculated 08/06/2020 20.6  MG/DL Final    CHOL/HDL Ratio 08/06/2020 3.6  0.0 - 5.0   Final            Vitals:     08/06/20 1124 08/06/20 1711 08/06/20 1954 08/07/20 0754   BP: 107/76 115/80 112/81 120/85   Pulse: 86 90 85 76   Resp: 16 16 16 16   Temp: 98.1 °F (36.7 °C) 98 °F (36.7 °C) 98 °F (36.7 °C) 98.6 °F (37 °C)   SpO2: 98% 100% 100% 97%   Weight:           Height:             Recent Results   No results found for this or any previous visit (from the past 24 hour(s)).        RADIOLOGY REPORTS:       Results from Hospital Encounter encounter on 02/16/14   XR CHEST PA LAT     Narrative **Final Report**        ICD Codes / Adm. Diagnosis: 075692   / Chest Pain    Examination:  CR CHEST PA AND LATERAL  - [id number] - Feb 16 2014  6:49PM  Accession No:  [id number]  Reason:  chest pain        REPORT:  EXAM:  CR CHEST PA AND LATERAL.     INDICATION: Chest pain.     COMPARISON: None.     FINDINGS:  PA and lateral radiographs of the chest were obtained.     Lungs: The lungs are clear of mass, nodule, airspace disease or edema.     Pleura:  There is no pleural effusion or pneumothorax.     Mediastinum: The cardiac and mediastinal contours and pulmonary vascularity   are normal.     Bones and soft tissues: The bones and soft tissues are within normal limits.         IMPRESSION: Normal chest.               Signing/Reading Doctor: Jose Maria Tapia ([id number])    Approved: Jose Maria Tapia ([id number])  Feb 16 2014  6:56PM                                 No results found.              PAST PSYCHIATRIC HISTORY:  See above.     PSYCHOSOCIAL HISTORY:  She is single. She has a 11month-old son. She lives with her aunt. She is currently unemployed, and her family is financially supporting her.     MENTAL STATUS EXAM:  She is alert and oriented in all spheres. She is dressed in hospital apparel. She is calm and pleasant during the interview. She reports her mood is good. Affect is reactive. Speech normal rate and rhythm. Thought process logical and goal-directed. She denies suicidal ideation, homicidal ideation, auditory or visual hallucinations. Memory seems intact. Intelligence seems average. Insight is partial.  Judgment is fair.     DIAGNOSES:  Major depressive disorder, recurrent, moderate, without psychotic features. Cannabis use disorder, unspecified. Hospital Course:    Patient was admitted to the Psychiatric services for acute psychiatric stabilization in regards to symptomatology as described in the HPI above and placed on Q15 minute checks and suicide precautions. Standing medications were ordered. She was started on wellbutrin. While on the unit Pooja Contreras was involved in individual, group, occupational and milieu therapy. She improved gradually and was able to integrate into the milieu with help from the nursing staff. Patients symptoms improved gradually including suicidal ideation, depression, hopelessness. She was appropriate in her interactions, and cooperative with medications and the unit routine. Please see individual progress notes for more specific details regarding patient's hospitalization course. Patient was discharged as per the plan.  She had been doing well on the unit as per the report of the nursing staff and my observations. No PRN medication for agitation, seclusion or restraints were required during the last 48 hours of her stay. Arvind Tinoco had improved progressively to the point of being stable for discharge and outpatient FU. At this time she did not offer any complaints. Patient denied any SI or HI. Denied any AH or VH. She denied any delusions. Was not considered a danger to self or to others and is safe for discharge. Will FU with her appointments and remains motivated to be in treatment. The patient verbalized understanding of her discharge instructions. Some parts of the discharge summary are from the initial Psychiatric interview that was done on admission by the admitting psychiatrist.       Allergies:(reviewed/updated 8/7/2020)  No Known Allergies    Side Effects: (reviewed/updated 8/7/2020)  None reported or admitted to. Vital Signs:  Patient Vitals for the past 24 hrs:   Temp Pulse Resp BP SpO2   08/07/20 0754 98.6 °F (37 °C) 76 16 120/85 97 %   08/06/20 1954 98 °F (36.7 °C) 85 16 112/81 100 %   08/06/20 1711 98 °F (36.7 °C) 90 16 115/80 100 %   08/06/20 1124 98.1 °F (36.7 °C) 86 16 107/76 98 %     Wt Readings from Last 3 Encounters:   08/05/20 135.2 kg (298 lb)   04/03/15 125.3 kg (276 lb 3.8 oz)   01/08/15 127 kg (280 lb)     Temp Readings from Last 3 Encounters:   08/07/20 98.6 °F (37 °C)   04/04/15 98.5 °F (36.9 °C)   01/09/15 97.2 °F (36.2 °C)     BP Readings from Last 3 Encounters:   08/07/20 120/85   04/04/15 103/66   01/09/15 140/86     Pulse Readings from Last 3 Encounters:   08/07/20 76   04/04/15 67   01/09/15 88       Labs: (reviewed/updated 8/7/2020)  No results found for this or any previous visit (from the past 24 hour(s)). No results found for: VALF2, VALAC, VALP, VALPR, DS6, CRBAM, CRBAMP, CARB2, XCRBAM  No results found for: [facility]    Radiology (reviewed/updated 8/7/2020)  No results found.     Mental Status Exam on Discharge:  General appearance:   Arvind Tinoco is a 31 y.o. BLACK OR  female who is well groomed, psychomotor activity is WNL  Eye contact: makes good eye contact  Speech: Spontaneous and coherent  Affect : Euthymic  Mood: \"OK\"  Thought Process: Logical, goal directed  Perception: Denies any AH or VH. Thought Content: Denies any SI or Plan  Insight: Partial  Judgement: Fair  Cognition: Intact grossly. Discharge Diagnosis:   Major depressive disorder, recurrent, moderate, without psychotic features. Cannabis use disorder, unspecified. Current Discharge Medication List      START taking these medications    Details   buPROPion XL (Wellbutrin XL) 150 mg tablet Take 1 Tab by mouth daily. Indications: major depressive disorder  Qty: 30 Tab, Refills: 0                  Follow-up Information     Follow up With Specialties Details Why [address]  Go on 8/27/2020 10am with w/ Nacho Gore via the phone. Ask your  to be connected to a therapist.  [facility], [address] [place] [address]  [phone]    Chet Laureano NP   Go on 8/31/2020 Virtual appointment with NP at 10:20am.  Maternal Fetal Medicine  Obstetrics and Gynecology   [facility] [address]   [address], [address] [place] [address]       None    None (623) Patient stated that they have no PCP          WOUND CARE: none needed. Prognosis:   Good / Rosan Renata based on nature of patient's pathology/ies and treatment compliance issues. Prognosis is greatly dependent upon patient's ability to  follow up on psychiatric/psychotherapy appointments as well as to comply with psychiatric medications as prescribed.        I certify that this patients inpatient psychiatric hospital services furnished since the previous certification were, and continue to be, required for treatment that could reasonably be expected to improve the patient's condition, or for diagnostic study, and that the patient continues to need, on a daily basis, active treatment furnished directly by or requiring the supervision of inpatient psychiatric facility personnel. In addition, the hospital records show that services furnished were intensive treatment services, admission or related services, or equivalent services.      Signed:  Adia Hardy NP  8/7/2020

## 2020-08-07 NOTE — PROGRESS NOTES
Problem: Depressed Mood (Adult/Pediatric)  Goal: *STG: Participates in treatment plan  Outcome: Progressing Towards Goal  Goal: *STG: Verbalizes anger, guilt, and other feelings in a constructive manor  Outcome: Progressing Towards Goal  Goal: *STG: Attends activities and groups  Outcome: Progressing Towards Goal  Goal: *STG: Demonstrates reduction in symptoms and increase in insight into coping skills/future focused  Outcome: Progressing Towards Goal  Pt's affect is subdued, brighter when engaged in 1:1 interactions with peers.

## 2020-08-07 NOTE — INTERDISCIPLINARY ROUNDS
Behavioral Health Interdisciplinary Rounds Patient Name: Stephanie Mancuso  Age: 31 y.o. Room/Bed:  729/ Primary Diagnosis: <principal problem not specified> Admission Status: Voluntary Readmission within 30 days: no 
Power of  in place: no 
Patient requires a blocked bed: no          Reason for blocked bed: VTE Prophylaxis: Not indicated Mobility needs/Fall risk: no 
Flu Vaccine : not flu season Nutritional Plan: no 
Consults:         
Labs/Testing due today?: no 
 
Sleep hours: 8 Participation in Care/Groups:  yes Medication Compliant?: Yes PRNS (last 24 hours): None Restraints (last 24 hours):  no 
  
CIWA (range last 24 hours): COWS (range last 24 hours): Alcohol screening (AUDIT) completed -   AUDIT Score: 4 If applicable, date SBIRT discussed in treatment team AND documented:  
AUDIT Screen Score: AUDIT Score: 4 Document Brief Intervention (corresponds directly with the 5 A's, Ask, Advise, Assess, Assist, and Arrange): At- Risk Patients (Score 7-15 for women; 8-15 for men) Discuss concern patient is drinking at unhealthy levels known to increase risk of alcohol-related health problems. Is Patient ready to commit to change? If No: 
? Encourage reflection ? Discuss short term and long term health risks of consuming alcohol ? Barriers to change ? Reaffirm willingness to help / Educational materials provided If Yes: 
? Set goal 
? Plan 
? Educational materials provided Harmful use or Dependence (Score 16 or greater) ? Discuss short term and long term health risks of consuming alcohol ? Recommendations ? Negotiate drinking goal 
? Recommend addiction specialist/center ? Arrange follow-up appointments. Tobacco - patient is a smoker: Have You Used Tobacco in the Past 30 Days: Yes Illegal Drugs use: Have You Used Any Illegal Substances Over the Past 12 Months: Yes 
 
24 hour chart check complete: yes Patient goal(s) for today: attend groups, take medications Treatment team focus/goals: titrate medication, coordinate follow up. Progress note:  Denies SI. Pt said her thoughts are \"less cloudy. \" She is alert, oriented, calm and pleasant. She is improving.  
  
 
LOS:  2  Expected LOS:  
 
Financial concerns/prescription coverage:  
Family contact:  Jackelyn Solaon [address], [phone] Family requesting physician contact today:  No  
Discharge plan: Pt  will return home to son tomorrow. Access to weapons : No                                  
Outpatient provider(s): HA?  
Patient's preferred phone number for follow up call: [phone]  
  
Participating treatment team members: RENALDO Dhillon; Tyler Bain RN; Claire Aragon NP.

## 2020-08-08 VITALS
RESPIRATION RATE: 18 BRPM | TEMPERATURE: 98.1 F | WEIGHT: 293 LBS | DIASTOLIC BLOOD PRESSURE: 86 MMHG | BODY MASS INDEX: 39.68 KG/M2 | OXYGEN SATURATION: 99 % | HEIGHT: 72 IN | HEART RATE: 74 BPM | SYSTOLIC BLOOD PRESSURE: 119 MMHG

## 2020-08-08 PROCEDURE — 74011250637 HC RX REV CODE- 250/637: Performed by: NURSE PRACTITIONER

## 2020-08-08 RX ADMIN — BUPROPION HYDROCHLORIDE 100 MG: 100 TABLET, EXTENDED RELEASE ORAL at 08:41

## 2020-08-08 NOTE — PROGRESS NOTES
Pt resting quietly in bed with eyes closed and NAD noted. Respirations equal and unlabored. Will continue to monitor through q15 minute rounds. Problem: Falls - Risk of  Goal: *Absence of Falls  Description: Document Lucero Frye Fall Risk and appropriate interventions in the flowsheet.   Outcome: Progressing Towards Goal  Note: Fall Risk Interventions:            Medication Interventions: Teach patient to arise slowly

## 2020-08-08 NOTE — INTERDISCIPLINARY ROUNDS
Behavioral Health Interdisciplinary Rounds Patient Name: Tim Adam  Age: 31 y.o. Room/Bed:  729/ Primary Diagnosis: <principal problem not specified> Admission Status: Voluntary Readmission within 30 days: no 
Power of  in place: no 
Patient requires a blocked bed: no          Reason for blocked bed: VTE Prophylaxis: No 
 
Mobility needs/Fall risk: no 
Flu Vaccine : no  
Nutritional Plan: no 
Consults:         
Labs/Testing due today?: no 
 
Sleep hours: 7 Participation in Care/Groups:   
Medication Compliant?: Yes PRNS (last 24 hours): None Restraints (last 24 hours):  no 
  
CIWA (range last 24 hours): COWS (range last 24 hours): Alcohol screening (AUDIT) completed -   AUDIT Score: 4 If applicable, date SBIRT discussed in treatment team AND documented:  
AUDIT Screen Score: AUDIT Score: 4 Document Brief Intervention (corresponds directly with the 5 A's, Ask, Advise, Assess, Assist, and Arrange): At- Risk Patients (Score 7-15 for women; 8-15 for men) Discuss concern patient is drinking at unhealthy levels known to increase risk of alcohol-related health problems. Is Patient ready to commit to change? If No: 
? Encourage reflection ? Discuss short term and long term health risks of consuming alcohol ? Barriers to change ? Reaffirm willingness to help / Educational materials provided If Yes: 
? Set goal 
? Plan 
? Educational materials provided Harmful use or Dependence (Score 16 or greater) ? Discuss short term and long term health risks of consuming alcohol ? Recommendations ? Negotiate drinking goal 
? Recommend addiction specialist/center ? Arrange follow-up appointments. Tobacco - patient is a smoker: Have You Used Tobacco in the Past 30 Days: Yes Illegal Drugs use: Have You Used Any Illegal Substances Over the Past 12 Months: Yes 
 
24 hour chart check complete: 
 
Patient goal(s) for today:  
Treatment team focus/goals: Progress note LOS:  3  Expected LOS:  
 
Financial concerns/prescription coverage:  
Family contact:      
Family requesting physician contact today: 
Discharge plan: Access to weapons :  
     
Outpatient provider(s):  
Patient's preferred phone number for follow up call :  
 
Participating treatment team members: Rohit Sales, * (assigned SW),

## 2020-08-08 NOTE — PROGRESS NOTES
Problem: Depressed Mood (Adult/Pediatric)  Goal: *STG: Participates in treatment plan  Outcome: Progressing Towards Goal  Note: Pt visible on the unit and interacting with staff. Plan for pt to be discharged today. No distress noted. Ate her breakfast and took medications as scheduled. Goal: Interventions  Outcome: Progressing Towards Goal     Problem: Patient Education: Go to Patient Education Activity  Goal: Patient/Family Education  Outcome: Progressing Towards Goal     Problem: Falls - Risk of  Goal: *Absence of Falls  Description: Document Marci Dowling Fall Risk and appropriate interventions in the flowsheet.   Outcome: Progressing Towards Goal  Note: Fall Risk Interventions:            Medication Interventions: Teach patient to arise slowly                   Problem: Patient Education: Go to Patient Education Activity  Goal: Patient/Family Education  Outcome: Progressing Towards Goal

## 2020-08-08 NOTE — DISCHARGE INSTRUCTIONS
DISCHARGE SUMMARY    NAME:Pooja [facility]  : 1995  MRN: [id number]    The patient March Hammans exhibits the ability to control behavior in a less restrictive environment. Patient's level of functioning is improving. No assaultive/destructive behavior has been observed for the past 24 hours. No suicidal/homicidal threat or behavior has been observed for the past 24 hours. There is no evidence of serious medication side effects. Patient has not been in physical or protective restraints for at least the past 24 hours. If weapons involved, how are they secured? No     Is patient aware of and in agreement with discharge plan? Yes    Arrangements for medication:  Prescriptions given to patient. Copy of discharge instructions to provider?:  [address] for transportation home:  Family to . Keep all follow up appointments as scheduled, continue to take prescribed medications per physician instructions. Mental health crisis number:  [phone] or your local mental health crisis line number at [address] [place] from Nurse    PATIENT INSTRUCTIONS:    What to do at Home:  Recommended activity: Activity as tolerated,     If you experience any of the following symptoms hopelessness, thoughts of hurting yourself or others, please follow up with 911 or your local mental health crisis line at [phone] . *  Please give a list of your current medications to your Primary Care Provider. *  Please update this list whenever your medications are discontinued, doses are      changed, or new medications (including over-the-counter products) are added. *  Please carry medication information at all times in case of emergency situations. These are general instructions for a healthy lifestyle:    No smoking/ No tobacco products/ Avoid exposure to second hand smoke  Surgeon General's Warning:  Quitting smoking now greatly reduces serious risk to your health.     Obesity, smoking, and sedentary lifestyle greatly increases your risk for illness    A healthy diet, regular physical exercise & weight monitoring are important for maintaining a healthy lifestyle    You may be retaining fluid if you have a history of heart failure or if you experience any of the following symptoms:  Weight gain of 3 pounds or more overnight or 5 pounds in a week, increased swelling in our hands or feet or shortness of breath while lying flat in bed. Please call your doctor as soon as you notice any of these symptoms; do not wait until your next office visit. The discharge information has been reviewed with the patient. The patient verbalized understanding. Discharge medications reviewed with the patient and appropriate educational materials and side effects teaching were provided.   ___________________________________________________________________________________________________________________________________

## 2020-08-08 NOTE — PROGRESS NOTES
[facility] HISTORY AND PHYSICAL    Name:  Pipe Lomax  MR#:  [de-identified]  :  1995  ACCOUNT #:  [de-identified]  ADMIT DATE:  2020    INITIAL PSYCHIATRIC EVALUATION    DATE OF SERVICE:  2020    CHIEF COMPLAINT:  \"I have been feeling really depressed. \"    HISTORY OF PRESENT ILLNESS:  The patient is a 14-year-old female who is currently admitted at [address] on a voluntary basis. She stated she has suffered from depression back when she was in high school. She was put on Celexa, Adderall and risperidone. She shared that she was admitted at [facility] twice after she overdosed on both occasions. She spoke to her OB/GYN a few days ago, and she was prescribed Celexa, but she never had a chance to pick it up since her OB recommended for her to be evaluated in the ER. The patient states that her depression back in high school had resolved, but then she started feeling depressed after the birth of her 11month-old son. She shares that she had a traumatic childbirth. She had to go into emergency . She also had to go back to the hospital due to infection in her incision site. She states she has been overwhelmed with psychosocial stressors, trying to become a mother, trying to get a job and trying to support her son. Her motivation has been poor, and her appetite has increased. She states that she was feeling hopeless and helpless to the point that she had developed passive thoughts of suicide. She shares that she would not care if she was not here anymore. Her urine drug screen is positive for THC. She states she smokes THC on weekends. She is supposed to have an appointment with [address] [place] [address] in 6 months. She currently denies suicidal ideation, homicidal ideation, auditory or visual hallucination. 20: [facility] continues to do well. She is calm and pleasant. Future oriented. She denies si hi or avh.  She is tolerating her meds and denies side effects. No management issue. Plan for dc today.     PAST MEDICAL HISTORY:  See H and P.    Labs: (reviewed/updated 8/8/2020)  Patient Vitals for the past 8 hrs:   BP Temp Pulse Resp SpO2   08/08/20 0752 119/86 98.1 °F (36.7 °C) 74 18 99 %     Labs Reviewed   DRUG SCREEN, URINE - Abnormal; Notable for the following components:       Result Value    THC (TH-CANNABINOL) Positive (*)     All other components within normal limits   SALICYLATE - Abnormal; Notable for the following components:    Salicylate level <8.0 (*)     All other components within normal limits   ACETAMINOPHEN - Abnormal; Notable for the following components:    Acetaminophen level <2 (*)     All other components within normal limits   METABOLIC PANEL, COMPREHENSIVE - Abnormal; Notable for the following components:    Glucose 147 (*)     BUN/Creatinine ratio 11 (*)     Globulin 4.4 (*)     A-G Ratio 0.8 (*)     All other components within normal limits   CBC WITH AUTOMATED DIFF - Abnormal; Notable for the following components:    HGB 11.0 (*)     MCV 71.2 (*)     MCH 21.2 (*)     MCHC 29.8 (*)     RDW 17.7 (*)     All other components within normal limits   LIPID PANEL - Abnormal; Notable for the following components:    LDL, calculated 101.4 (*)     All other components within normal limits   URINE CULTURE HOLD SAMPLE   URINALYSIS W/MICROSCOPIC   ETHYL ALCOHOL   SARS-COV-2   TSH 3RD GENERATION   HCG URINE, QL. - POC     Lab Results   Component Value Date/Time    Sodium 140 08/04/2020 09:47 PM    Potassium 3.6 08/04/2020 09:47 PM    Chloride 108 08/04/2020 09:47 PM    CO2 22 08/04/2020 09:47 PM    Anion gap 10 08/04/2020 09:47 PM    Glucose 147 (H) 08/04/2020 09:47 PM    BUN 9 08/04/2020 09:47 PM    Creatinine 0.79 08/04/2020 09:47 PM    BUN/Creatinine ratio 11 (L) 08/04/2020 09:47 PM    GFR est AA >60 08/04/2020 09:47 PM    GFR est non-AA >60 08/04/2020 09:47 PM    Calcium 9.0 08/04/2020 09:47 PM    Bilirubin, total 0.5 08/04/2020 09:47 PM    Alk. phosphatase 84 08/04/2020 09:47 PM    Protein, total 7.9 08/04/2020 09:47 PM    Albumin 3.5 08/04/2020 09:47 PM    Globulin 4.4 (H) 08/04/2020 09:47 PM    A-G Ratio 0.8 (L) 08/04/2020 09:47 PM    ALT (SGPT) 22 08/04/2020 09:47 PM     Admission on 08/04/2020   Component Date Value Ref Range Status    Pregnancy test,urine (POC) 08/04/2020 Negative  NEG   Final    Urine culture hold 08/04/2020 Urine on hold in Microbiology dept for 2 days. If unpreserved urine is submitted, it cannot be used for addtional testing after 24 hours, recollection will be required.     Final    Color 08/04/2020 YELLOW/STRAW    Final    Appearance 08/04/2020 CLEAR  CLEAR   Final    Specific gravity 08/04/2020 1.017  1.003 - 1.030   Final    pH (UA) 08/04/2020 5.0  5.0 - 8.0   Final    Protein 08/04/2020 Negative  NEG mg/dL Final    Glucose 08/04/2020 Negative  NEG mg/dL Final    Ketone 08/04/2020 Negative  NEG mg/dL Final    Bilirubin 08/04/2020 Negative  NEG   Final    Blood 08/04/2020 Negative  NEG   Final    Urobilinogen 08/04/2020 0.2  0.2 - 1.0 EU/dL Final    Nitrites 08/04/2020 Negative  NEG   Final    Leukocyte Esterase 08/04/2020 Negative  NEG   Final    WBC 08/04/2020 0-4  0 - 4 /hpf Final    RBC 08/04/2020 0-5  0 - 5 /hpf Final    Epithelial cells 08/04/2020 FEW  FEW /lpf Final    Bacteria 08/04/2020 Negative  NEG /hpf Final    Hyaline cast 08/04/2020 0-2  0 - 5 /lpf Final    AMPHETAMINES 08/04/2020 Negative  NEG   Final    BARBITURATES 08/04/2020 Negative  NEG   Final    BENZODIAZEPINES 08/04/2020 Negative  NEG   Final    COCAINE 08/04/2020 Negative  NEG   Final    METHADONE 08/04/2020 Negative  NEG   Final    OPIATES 08/04/2020 Negative  NEG   Final    PCP(PHENCYCLIDINE) 08/04/2020 Negative  NEG   Final    THC (TH-CANNABINOL) 08/04/2020 Positive* NEG   Final    Drug screen comment 08/04/2020 (NOTE)   Final    Salicylate level 20/95/7732 <1.7* 2.8 - 20.0 MG/DL Final  Acetaminophen level 08/04/2020 <2* 10 - 30 ug/mL Final    ALCOHOL(ETHYL),SERUM 08/04/2020 <10  <10 MG/DL Final    Sodium 08/04/2020 140  136 - 145 mmol/L Final    Potassium 08/04/2020 3.6  3.5 - 5.1 mmol/L Final    Chloride 08/04/2020 108  97 - 108 mmol/L Final    CO2 08/04/2020 22  21 - 32 mmol/L Final    Anion gap 08/04/2020 10  5 - 15 mmol/L Final    Glucose 08/04/2020 147* 65 - 100 mg/dL Final    BUN 08/04/2020 9  6 - 20 MG/DL Final    Creatinine 08/04/2020 0.79  0.55 - 1.02 MG/DL Final    BUN/Creatinine ratio 08/04/2020 11* 12 - 20   Final    GFR est AA 08/04/2020 >60  >60 ml/min/1.73m2 Final    GFR est non-AA 08/04/2020 >60  >60 ml/min/1.73m2 Final    Calcium 08/04/2020 9.0  8.5 - 10.1 MG/DL Final    Bilirubin, total 08/04/2020 0.5  0.2 - 1.0 MG/DL Final    ALT (SGPT) 08/04/2020 22  12 - 78 U/L Final    AST (SGOT) 08/04/2020 16  15 - 37 U/L Final    Alk.  phosphatase 08/04/2020 84  45 - 117 U/L Final    Protein, total 08/04/2020 7.9  6.4 - 8.2 g/dL Final    Albumin 08/04/2020 3.5  3.5 - 5.0 g/dL Final    Globulin 08/04/2020 4.4* 2.0 - 4.0 g/dL Final    A-G Ratio 08/04/2020 0.8* 1.1 - 2.2   Final    WBC 08/04/2020 8.4  3.6 - 11.0 K/uL Final    RBC 08/04/2020 5.18  3.80 - 5.20 M/uL Final    HGB 08/04/2020 11.0* 11.5 - 16.0 g/dL Final    HCT 08/04/2020 36.9  35.0 - 47.0 % Final    MCV 08/04/2020 71.2* 80.0 - 99.0 FL Final    MCH 08/04/2020 21.2* 26.0 - 34.0 PG Final    MCHC 08/04/2020 29.8* 30.0 - 36.5 g/dL Final    RDW 08/04/2020 17.7* 11.5 - 14.5 % Final    PLATELET 33/20/3304 550  150 - 400 K/uL Final    NRBC 08/04/2020 0.0  0  WBC Final    ABSOLUTE NRBC 08/04/2020 0.00  0.00 - 0.01 K/uL Final    NEUTROPHILS 08/04/2020 57  32 - 75 % Final    LYMPHOCYTES 08/04/2020 35  12 - 49 % Final    MONOCYTES 08/04/2020 5  5 - 13 % Final    EOSINOPHILS 08/04/2020 2  0 - 7 % Final    BASOPHILS 08/04/2020 1  0 - 1 % Final    IMMATURE GRANULOCYTES 08/04/2020 0  0.0 - 0.5 % Final  ABS. NEUTROPHILS 08/04/2020 4.8  1.8 - 8.0 K/UL Final    ABS. LYMPHOCYTES 08/04/2020 2.9  0.8 - 3.5 K/UL Final    ABS. MONOCYTES 08/04/2020 0.4  0.0 - 1.0 K/UL Final    ABS. EOSINOPHILS 08/04/2020 0.2  0.0 - 0.4 K/UL Final    ABS. BASOPHILS 08/04/2020 0.0  0.0 - 0.1 K/UL Final    ABS. IMM. GRANS. 08/04/2020 0.0  0.00 - 0.04 K/UL Final    DF 08/04/2020 AUTOMATED    Final    Specimen source 08/05/2020 Nasopharyngeal    Final    SARS-CoV-2 08/05/2020 Not detected  NOTD   Final    Specimen source 08/05/2020 Nasopharyngeal    Final    TSH 08/06/2020 0.54  0.36 - 3.74 uIU/mL Final    LIPID PROFILE 08/06/2020        Final    Cholesterol, total 08/06/2020 169  <200 MG/DL Final    Triglyceride 08/06/2020 103  <150 MG/DL Final    HDL Cholesterol 08/06/2020 47  MG/DL Final    LDL, calculated 08/06/2020 101.4* 0 - 100 MG/DL Final    VLDL, calculated 08/06/2020 20.6  MG/DL Final    CHOL/HDL Ratio 08/06/2020 3.6  0.0 - 5.0   Final     Vitals:    08/07/20 1257 08/07/20 1600 08/07/20 2026 08/08/20 0752   BP: 116/83 119/79 (!) 141/92 119/86   Pulse: 91 80 77 74   Resp: 16 16 16 18   Temp: 98.2 °F (36.8 °C) 98.2 °F (36.8 °C)  98.1 °F (36.7 °C)   SpO2: 97% 100% 95% 99%   Weight:       Height:         No results found for this or any previous visit (from the past 24 hour(s)). RADIOLOGY REPORTS:  Results from Hospital Encounter encounter on 02/16/14   XR CHEST PA LAT    Narrative **Final Report**       ICD Codes / Adm. Diagnosis: 703589   / Chest Pain    Examination:  CR CHEST PA AND LATERAL  - [id number] - Feb 16 2014  6:49PM  Accession No:  [id number]  Reason:  chest pain      REPORT:  EXAM:  CR CHEST PA AND LATERAL. INDICATION: Chest pain. COMPARISON: None. FINDINGS:  PA and lateral radiographs of the chest were obtained. Lungs: The lungs are clear of mass, nodule, airspace disease or edema. Pleura: There is no pleural effusion or pneumothorax.     Mediastinum: The cardiac and mediastinal contours and pulmonary vascularity   are normal.    Bones and soft tissues: The bones and soft tissues are within normal limits. IMPRESSION: Normal chest.              Signing/Reading Doctor: Heide Carcamo ([id number])    Approved: Heide Carcamo ([id number])  Feb 16 2014  6:56PM                               No results found. PAST PSYCHIATRIC HISTORY:  See above. PSYCHOSOCIAL HISTORY:  She is single. She has a 11month-old son. She lives with her aunt. She is currently unemployed, and her family is financially supporting her. MENTAL STATUS EXAM:  She is alert and oriented in all spheres. She is dressed in hospital apparel. She is calm and pleasant during the interview. She reports her mood is good. Affect is reactive. Speech normal rate and rhythm. Thought process logical and goal-directed. She denies suicidal ideation, homicidal ideation, auditory or visual hallucinations. Memory seems intact. Intelligence seems average. Insight is partial.  Judgment is fair. DIAGNOSES:  Major depressive disorder, recurrent, moderate, without psychotic features. Cannabis use disorder, unspecified. TREATMENT PLAN:  I will continue her inpatient stay. She will be provided with support and encouraged to attend groups. Her safety will be monitored. Her medications will be modified and assessed. Case Management will work on discharge planning. ASSETS AND STRENGTHS:  She is willing to seek help. She is willing to take medications. ESTIMATED LENGTH OF STAY:  5-7 days.       ANTOINETTE NEAL NP      [id number]  D:  08/06/2020 13:30  T:  08/06/2020 15:09  JOB #:  [id number]

## 2020-08-08 NOTE — PROGRESS NOTES
Monitor symptoms and update for any worsening symptoms or abdominal pain.  Are vital signs stable? Update NP if no BM by tomorrow.    Orders:  1. Please change senokot 8.6 mg tablet to one tablet po BID.       Problem: Depressed Mood (Adult/Pediatric)  Goal: *STG: Complies with medication therapy  Outcome: Progressing Towards Goal     Problem: Falls - Risk of  Goal: *Absence of Falls  Description: Document Neftali Fall Risk and appropriate interventions in the flowsheet. Outcome: Progressing Towards Goal  Note: Fall Risk Interventions:     Medication Interventions: Teach patient to arise slowly    Out in milieu with peers watching tv. Cooperative. No acute distress noted. Meal and medication compliant. Staff will continue to monitor q 15 min checks.

## 2020-08-08 NOTE — BH NOTES
Pt discharged with staff to mother. No distress noted. All belongings returned to pt. Discharged instructions reviewed and signed with pt.

## 2020-08-10 NOTE — PROGRESS NOTES
Reached out to patient at [phone] for follow up. Patient reported she is aware of her follow up appointment times.

## 2020-09-06 ENCOUNTER — APPOINTMENT (OUTPATIENT)
Dept: GENERAL RADIOLOGY | Age: 25
End: 2020-09-06
Attending: EMERGENCY MEDICINE
Payer: MEDICAID

## 2020-09-06 ENCOUNTER — HOSPITAL ENCOUNTER (EMERGENCY)
Age: 25
Discharge: HOME | End: 2020-09-06
Attending: EMERGENCY MEDICINE | Admitting: EMERGENCY MEDICINE
Payer: MEDICAID

## 2020-09-06 VITALS
RESPIRATION RATE: 18 BRPM | TEMPERATURE: 98.5 F | HEIGHT: 72 IN | DIASTOLIC BLOOD PRESSURE: 77 MMHG | WEIGHT: 270 LBS | OXYGEN SATURATION: 100 % | SYSTOLIC BLOOD PRESSURE: 120 MMHG | HEART RATE: 91 BPM | BODY MASS INDEX: 36.57 KG/M2

## 2020-09-06 DIAGNOSIS — R51.9 NONINTRACTABLE HEADACHE, UNSPECIFIED CHRONICITY PATTERN, UNSPECIFIED HEADACHE TYPE: ICD-10-CM

## 2020-09-06 DIAGNOSIS — R11.2 NAUSEA AND VOMITING, INTRACTABILITY OF VOMITING NOT SPECIFIED, UNSPECIFIED VOMITING TYPE: Primary | ICD-10-CM

## 2020-09-06 DIAGNOSIS — M25.50 ARTHRALGIA, UNSPECIFIED JOINT: ICD-10-CM

## 2020-09-06 LAB
ALBUMIN SERPL-MCNC: 3.8 G/DL (ref 3.5–5)
ALBUMIN/GLOB SERPL: 0.8 {RATIO} (ref 1.1–2.2)
ALP SERPL-CCNC: 87 U/L (ref 45–117)
ALT SERPL-CCNC: 20 U/L (ref 12–78)
ANION GAP SERPL CALC-SCNC: 4 MMOL/L (ref 5–15)
APPEARANCE UR: CLEAR
AST SERPL-CCNC: 17 U/L (ref 15–37)
BACTERIA URNS QL MICRO: NEGATIVE /HPF
BASOPHILS # BLD: 0 K/UL (ref 0–0.1)
BASOPHILS NFR BLD: 0 % (ref 0–1)
BILIRUB SERPL-MCNC: 0.6 MG/DL (ref 0.2–1)
BILIRUB UR QL: NEGATIVE
BUN SERPL-MCNC: 8 MG/DL (ref 6–20)
BUN/CREAT SERPL: 9 (ref 12–20)
CALCIUM SERPL-MCNC: 9.4 MG/DL (ref 8.5–10.1)
CHLORIDE SERPL-SCNC: 106 MMOL/L (ref 97–108)
CO2 SERPL-SCNC: 27 MMOL/L (ref 21–32)
COLOR UR: ABNORMAL
COMMENT, HOLDF: NORMAL
CREAT SERPL-MCNC: 0.94 MG/DL (ref 0.55–1.02)
DIFFERENTIAL METHOD BLD: ABNORMAL
EOSINOPHIL # BLD: 0.1 K/UL (ref 0–0.4)
EOSINOPHIL NFR BLD: 1 % (ref 0–7)
EPITH CASTS URNS QL MICRO: ABNORMAL /LPF
ERYTHROCYTE [DISTWIDTH] IN BLOOD BY AUTOMATED COUNT: 20.4 % (ref 11.5–14.5)
GLOBULIN SER CALC-MCNC: 5 G/DL (ref 2–4)
GLUCOSE SERPL-MCNC: 108 MG/DL (ref 65–100)
GLUCOSE UR STRIP.AUTO-MCNC: NEGATIVE MG/DL
HCG UR QL: NEGATIVE
HCT VFR BLD AUTO: 40.7 % (ref 35–47)
HGB BLD-MCNC: 12.2 G/DL (ref 11.5–16)
HGB UR QL STRIP: ABNORMAL
HYALINE CASTS URNS QL MICRO: ABNORMAL /LPF (ref 0–5)
IMM GRANULOCYTES # BLD AUTO: 0 K/UL (ref 0–0.04)
IMM GRANULOCYTES NFR BLD AUTO: 0 % (ref 0–0.5)
KETONES UR QL STRIP.AUTO: ABNORMAL MG/DL
LEUKOCYTE ESTERASE UR QL STRIP.AUTO: NEGATIVE
LIPASE SERPL-CCNC: 60 U/L (ref 73–393)
LYMPHOCYTES # BLD: 1.7 K/UL (ref 0.8–3.5)
LYMPHOCYTES NFR BLD: 30 % (ref 12–49)
MCH RBC QN AUTO: 21.3 PG (ref 26–34)
MCHC RBC AUTO-ENTMCNC: 30 G/DL (ref 30–36.5)
MCV RBC AUTO: 71.2 FL (ref 80–99)
MONOCYTES # BLD: 0.4 K/UL (ref 0–1)
MONOCYTES NFR BLD: 8 % (ref 5–13)
NEUTS SEG # BLD: 3.4 K/UL (ref 1.8–8)
NEUTS SEG NFR BLD: 61 % (ref 32–75)
NITRITE UR QL STRIP.AUTO: NEGATIVE
NRBC # BLD: 0 K/UL (ref 0–0.01)
NRBC BLD-RTO: 0 PER 100 WBC
PH UR STRIP: 5.5 [PH] (ref 5–8)
PLATELET # BLD AUTO: 239 K/UL (ref 150–400)
POTASSIUM SERPL-SCNC: 3.6 MMOL/L (ref 3.5–5.1)
PROT SERPL-MCNC: 8.8 G/DL (ref 6.4–8.2)
PROT UR STRIP-MCNC: 30 MG/DL
RBC # BLD AUTO: 5.72 M/UL (ref 3.8–5.2)
RBC #/AREA URNS HPF: ABNORMAL /HPF (ref 0–5)
RBC MORPH BLD: ABNORMAL
RBC MORPH BLD: ABNORMAL
SAMPLES BEING HELD,HOLD: NORMAL
SODIUM SERPL-SCNC: 137 MMOL/L (ref 136–145)
SP GR UR REFRACTOMETRY: 1.02 (ref 1–1.03)
UR CULT HOLD, URHOLD: NORMAL
UROBILINOGEN UR QL STRIP.AUTO: 1 EU/DL (ref 0.2–1)
WBC # BLD AUTO: 5.6 K/UL (ref 3.6–11)
WBC URNS QL MICRO: ABNORMAL /HPF (ref 0–4)

## 2020-09-06 PROCEDURE — 71046 X-RAY EXAM CHEST 2 VIEWS: CPT

## 2020-09-06 PROCEDURE — 99284 EMERGENCY DEPT VISIT MOD MDM: CPT

## 2020-09-06 PROCEDURE — 83690 ASSAY OF LIPASE: CPT

## 2020-09-06 PROCEDURE — 85025 COMPLETE CBC W/AUTO DIFF WBC: CPT

## 2020-09-06 PROCEDURE — 81001 URINALYSIS AUTO W/SCOPE: CPT

## 2020-09-06 PROCEDURE — 80053 COMPREHEN METABOLIC PANEL: CPT

## 2020-09-06 PROCEDURE — 36415 COLL VENOUS BLD VENIPUNCTURE: CPT

## 2020-09-06 PROCEDURE — 96374 THER/PROPH/DIAG INJ IV PUSH: CPT

## 2020-09-06 PROCEDURE — 74011250636 HC RX REV CODE- 250/636: Performed by: EMERGENCY MEDICINE

## 2020-09-06 PROCEDURE — 81025 URINE PREGNANCY TEST: CPT

## 2020-09-06 RX ORDER — KETOROLAC TROMETHAMINE 30 MG/ML
15 INJECTION, SOLUTION INTRAMUSCULAR; INTRAVENOUS
Status: COMPLETED | OUTPATIENT
Start: 2020-09-06 | End: 2020-09-06

## 2020-09-06 RX ORDER — ONDANSETRON 4 MG/1
4 TABLET, ORALLY DISINTEGRATING ORAL
Qty: 12 TAB | Refills: 0 | Status: ON HOLD | OUTPATIENT
Start: 2020-09-06 | End: 2021-03-24

## 2020-09-06 RX ADMIN — KETOROLAC TROMETHAMINE 15 MG: 30 INJECTION, SOLUTION INTRAMUSCULAR at 17:48

## 2020-09-06 NOTE — ED PROVIDER NOTES
HPI   Patient is a 29-year-old female who presents to the ED reporting body aches, intermittent headache and chest congestion since Tuesday. She is presently 6 months postpartum; not breast-feeding. She has a past medical history significant for ADHD, depression, mood disorder, secondhand smoke exposure and recent admission to [facility] for depression. Denies fever, cold symptoms, neck pain, visual changes, focal weakness or rash. Denies any  difficulty breathing, difficulty swallowing, SOB or chest pain. Denies any nausea, vomiting or diarrhea. Pt. Reports taking her prescribed Prozac as ordered. She has not had any other medications today prior to arrival.  She tested negative for COVID-19 August 4. Past Medical History:   Diagnosis Date    ADHD (attention deficit hyperactivity disorder)     Depression     Mood disorder (HCC)     Second hand smoke exposure        Past Surgical History:   Procedure Laterality Date    HX GYN           History reviewed. No pertinent family history.     Social History     Socioeconomic History    Marital status: SINGLE     Spouse name: Not on file    Number of children: Not on file    Years of education: Not on file    Highest education level: Not on file   Occupational History    Not on file   Social Needs    Financial resource strain: Not on file    Food insecurity     Worry: Not on file     Inability: Not on file    Transportation needs     Medical: Not on file     Non-medical: Not on file   Tobacco Use    Smoking status: Current Every Day Smoker     Packs/day: 0.25    Smokeless tobacco: Never Used   Substance and Sexual Activity    Alcohol use: Not Currently     Comment: socially    Drug use: Yes     Types: Marijuana    Sexual activity: Yes   Lifestyle    Physical activity     Days per week: Not on file     Minutes per session: Not on file    Stress: Not on file   Relationships    Social connections     Talks on phone: Not on file     Gets together: Not on file     Attends Voodoo service: Not on file     Active member of club or organization: Not on file     Attends meetings of clubs or organizations: Not on file     Relationship status: Not on file    Intimate partner violence     Fear of current or ex partner: Not on file     Emotionally abused: Not on file     Physically abused: Not on file     Forced sexual activity: Not on file   Other Topics Concern     Service Not Asked    Blood Transfusions Not Asked    Caffeine Concern Not Asked    Occupational Exposure Not Asked   Lavenia Levels Hazards Not Asked    Sleep Concern Not Asked    Stress Concern Not Asked    Weight Concern Not Asked    Special Diet Not Asked    Back Care Not Asked    Exercise Not Asked    Bike Helmet Not Asked   [address] Not Asked    Self-Exams Not Asked   Social History Narrative    Not on file         ALLERGIES: Patient has no known allergies. Review of Systems   Constitutional: Negative for activity change, appetite change, fever and unexpected weight change. HENT: Positive for congestion and rhinorrhea. Negative for sore throat and trouble swallowing. Eyes: Negative for visual disturbance. Respiratory: Negative for cough and shortness of breath. Cardiovascular: Negative for chest pain, palpitations and leg swelling. Gastrointestinal: Negative for abdominal pain, diarrhea, nausea and vomiting. Genitourinary: Negative for dysuria. Musculoskeletal: Positive for arthralgias. Negative for gait problem, myalgias and neck pain. Skin: Negative for rash. Neurological: Negative for dizziness, light-headedness and headaches. All other systems reviewed and are negative. Vitals:    09/06/20 1656   BP: 140/80   Pulse: (!) 102   Resp: 16   Temp: 98.9 °F (37.2 °C)   SpO2: 98%   Weight: 122.5 kg (270 lb)   Height: 6' (1.829 m)            Physical Exam  Vitals signs and nursing note reviewed.    Constitutional:       General: She is not in acute distress. Appearance: Normal appearance. She is not ill-appearing, toxic-appearing or diaphoretic. Comments: Female; smoker   HENT:      Right Ear: Tympanic membrane normal.      Left Ear: Tympanic membrane normal.      Nose: Nose normal.      Mouth/Throat:      Mouth: Mucous membranes are moist.      Pharynx: No posterior oropharyngeal erythema. Neck:      Musculoskeletal: Normal range of motion and neck supple. Cardiovascular:      Rate and Rhythm: Normal rate and regular rhythm. Pulmonary:      Effort: Pulmonary effort is normal.      Breath sounds: Normal breath sounds. Abdominal:      General: Bowel sounds are normal.      Tenderness: There is no abdominal tenderness. There is no guarding or rebound. Lymphadenopathy:      Cervical: No cervical adenopathy. Skin:     General: Skin is warm and dry. Findings: No rash. Neurological:      General: No focal deficit present. Mental Status: She is alert and oriented to person, place, and time. Psychiatric:         Mood and Affect: Mood normal.         Behavior: Behavior normal.          MDM       Procedures    Xr Chest Pa Lat    Result Date: 9/6/2020  Impression: No acute cardiopulmonary process.     Labs Reviewed   CBC WITH AUTOMATED DIFF - Abnormal; Notable for the following components:       Result Value    RBC 5.72 (*)     MCV 71.2 (*)     MCH 21.3 (*)     RDW 20.4 (*)     All other components within normal limits   METABOLIC PANEL, COMPREHENSIVE - Abnormal; Notable for the following components:    Anion gap 4 (*)     Glucose 108 (*)     BUN/Creatinine ratio 9 (*)     Protein, total 8.8 (*)     Globulin 5.0 (*)     A-G Ratio 0.8 (*)     All other components within normal limits   LIPASE - Abnormal; Notable for the following components:    Lipase 60 (*)     All other components within normal limits   URINALYSIS W/ RFLX MICROSCOPIC - Abnormal; Notable for the following components:    Protein 30 (*)     Ketone TRACE (*)     Blood TRACE (*)     All other components within normal limits   URINE CULTURE HOLD SAMPLE   SAMPLES BEING HELD   *UA&MICRO CHARGE BAT   HCG URINE, QL. - POC     Patient has been reexamined and denies any complaints of pain or discomfort. Encourage supportive treatment at home, Motrin or Tylenol as needed for pain. Will give short course of Zofran as needed for nausea. Encourage close follow-up with PCP.  9:01 PM  Patient's results and plan of care have been reviewed with her. Patient and/or family have verbally conveyed their understanding and agreement of the patient's signs, symptoms, diagnosis, treatment and prognosis and additionally agree to follow up as recommended or return to the Emergency Room should her condition change prior to follow-up. Discharge instructions have also been provided to the patient with some educational information regarding her diagnosis as well a list of reasons why she would want to return to the ER prior to her follow-up appointment should her condition change. George Peñaloza, NP

## 2020-09-06 NOTE — DISCHARGE INSTRUCTIONS
Patient Education        Headache: Care Instructions  Your Care Instructions     Headaches have many possible causes. Most headaches aren't a sign of a more serious problem, and they will get better on their own. Home treatment may help you feel better faster. The doctor has checked you carefully, but problems can develop later. If you notice any problems or new symptoms, get medical treatment right away. Follow-up care is a key part of your treatment and safety. Be sure to make and go to all appointments, and call your doctor if you are having problems. It's also a good idea to know your test results and keep a list of the medicines you take. How can you care for yourself at home? · Do not drive if you have taken a prescription pain medicine. · Rest in a quiet, dark room until your headache is gone. Close your eyes and try to relax or go to sleep. Don't watch TV or read. · Put a cold, moist cloth or cold pack on the painful area for 10 to 20 minutes at a time. Put a thin cloth between the cold pack and your skin. · Use a warm, moist towel or a heating pad set on low to relax tight shoulder and neck muscles. · Have someone gently massage your neck and shoulders. · Take pain medicines exactly as directed. ? If the doctor gave you a prescription medicine for pain, take it as prescribed. ? If you are not taking a prescription pain medicine, ask your doctor if you can take an over-the-counter medicine. · Be careful not to take pain medicine more often than the instructions allow, because you may get worse or more frequent headaches when the medicine wears off. · Do not ignore new symptoms that occur with a headache, such as a fever, weakness or numbness, vision changes, or confusion. These may be signs of a more serious problem. To prevent headaches  · Keep a headache diary so you can figure out what triggers your headaches. Avoiding triggers may help you prevent headaches.  Record when each headache began, how long it lasted, and what the pain was like (throbbing, aching, stabbing, or dull). Write down any other symptoms you had with the headache, such as nausea, flashing lights or dark spots, or sensitivity to bright light or loud noise. Note if the headache occurred near your period. List anything that might have triggered the headache, such as certain foods (chocolate, cheese, wine) or odors, smoke, bright light, stress, or lack of sleep. · Find healthy ways to deal with stress. Headaches are most common during or right after stressful times. Take time to relax before and after you do something that has caused a headache in the past.  · Try to keep your muscles relaxed by keeping good posture. Check your jaw, face, neck, and shoulder muscles for tension, and try relaxing them. When sitting at a desk, change positions often, and stretch for 30 seconds each hour. · Get plenty of sleep and exercise. · Eat regularly and well. Long periods without food can trigger a headache. · Treat yourself to a massage. Some people find that regular massages are very helpful in relieving tension. · Limit caffeine by not drinking too much coffee, tea, or soda. But don't quit caffeine suddenly, because that can also give you headaches. · Reduce eyestrain from computers by blinking frequently and looking away from the computer screen every so often. Make sure you have proper eyewear and that your monitor is set up properly, about an arm's length away. · Seek help if you have depression or anxiety. Your headaches may be linked to these conditions. Treatment can both prevent headaches and help with symptoms of anxiety or depression. When should you call for help? Call 911 anytime you think you may need emergency care. For example, call if:    · You have signs of a stroke. These may include:  ? Sudden numbness, paralysis, or weakness in your face, arm, or leg, especially on only one side of your body.   ? Sudden vision changes. ? Sudden trouble speaking. ? Sudden confusion or trouble understanding simple statements. ? Sudden problems with walking or balance. ? A sudden, severe headache that is different from past headaches. Call your doctor now or seek immediate medical care if:    · You have a new or worse headache.     · Your headache gets much worse. Where can you learn more? Go to http://rudy-jani.info/  Enter M271 in the search box to learn more about \"Headache: Care Instructions. \"  Current as of: November 20, 2019               Content Version: 12.6  © 5586-2315 [employer]. Care instructions adapted under license by [employer] (which disclaims liability or warranty for this information). If you have questions about a medical condition or this instruction, always ask your healthcare professional. [address] any warranty or liability for your use of this information. Patient Education        Musculoskeletal Pain: Care Instructions  Your Care Instructions     Different problems with the bones, muscles, nerves, ligaments, and tendons in the body can cause pain. One or more areas of your body may ache or burn. Or they may feel tired, stiff, or sore. The medical term for this type of pain is musculoskeletal pain. It can have many different causes. Sometimes the pain is caused by an injury such as a strain or sprain. Or you might have pain from using one part of your body in the same way over and over again. This is called overuse. In some cases, the cause of the pain is another health problem such as arthritis or fibromyalgia. The doctor will examine you and ask you questions about your health to help find the cause of your pain. Blood tests or imaging tests like an X-ray may also be helpful. But sometimes doctors can't find a cause of the pain. Treatment depends on your symptoms and the cause of the pain, if known.   The doctor has checked you carefully, but problems can develop later. If you notice any problems or new symptoms, get medical treatment right away. Follow-up care is a key part of your treatment and safety. Be sure to make and go to all appointments, and call your doctor if you are having problems. It's also a good idea to know your test results and keep a list of the medicines you take. How can you care for yourself at home? · Rest until you feel better. · Do not do anything that makes the pain worse. Return to exercise gradually if you feel better and your doctor says it's okay. · Be safe with medicines. Read and follow all instructions on the label. ? If the doctor gave you a prescription medicine for pain, take it as prescribed. ? If you are not taking a prescription pain medicine, ask your doctor if you can take an over-the-counter medicine. · Put ice or a cold pack on the area for 10 to 20 minutes at a time to ease pain. Put a thin cloth between the ice and your skin. When should you call for help? Call your doctor now or seek immediate medical care if:    · You have new pain, or your pain gets worse.     · You have new symptoms such as a fever, a rash, or chills. Watch closely for changes in your health, and be sure to contact your doctor if:    · You do not get better as expected. Where can you learn more? Go to http://www.gray.com/  Enter Q624 in the search box to learn more about \"Musculoskeletal Pain: Care Instructions. \"  Current as of: November 20, 2019               Content Version: 12.6  © 3267-3472 [employer]. Care instructions adapted under license by [employer] (which disclaims liability or warranty for this information). If you have questions about a medical condition or this instruction, always ask your healthcare professional. [address] any warranty or liability for your use of this information.          Patient Education        Nausea and Vomiting: Care Instructions  Your Care Instructions     When you are nauseated, you may feel weak and sweaty and notice a lot of saliva in your mouth. Nausea often leads to vomiting. Most of the time you do not need to worry about nausea and vomiting, but they can be signs of other illnesses. Two common causes of nausea and vomiting are stomach flu and food poisoning. Nausea and vomiting from viral stomach flu will usually start to improve within 24 hours. Nausea and vomiting from food poisoning may last from 12 to 48 hours. The doctor has checked you carefully, but problems can develop later. If you notice any problems or new symptoms, get medical treatment right away. Follow-up care is a key part of your treatment and safety. Be sure to make and go to all appointments, and call your doctor if you are having problems. It's also a good idea to know your test results and keep a list of the medicines you take. How can you care for yourself at home? · To prevent dehydration, drink plenty of fluids, enough so that your urine is light yellow or clear like water. Choose water and other caffeine-free clear liquids until you feel better. If you have kidney, heart, or liver disease and have to limit fluids, talk with your doctor before you increase the amount of fluids you drink. · Rest in bed until you feel better. · When you are able to eat, try clear soups, mild foods, and liquids until all symptoms are gone for 12 to 48 hours. Other good choices include dry toast, crackers, cooked cereal, and gelatin dessert, such as Jell-O. When should you call for help? Call 911 anytime you think you may need emergency care. For example, call if:    · You passed out (lost consciousness). Call your doctor now or seek immediate medical care if:    · You have symptoms of dehydration, such as:  ? Dry eyes and a dry mouth. ? Passing only a little dark urine. ?  Feeling thirstier than usual.     · You have new or worsening belly pain.     · You have a new or higher fever.     · You vomit blood or what looks like coffee grounds. Watch closely for changes in your health, and be sure to contact your doctor if:    · You have ongoing nausea and vomiting.     · Your vomiting is getting worse.     · Your vomiting lasts longer than 2 days.     · You are not getting better as expected. Where can you learn more? Go to http://www.alexandra.com/  Enter H591 in the search box to learn more about \"Nausea and Vomiting: Care Instructions. \"  Current as of: June 26, 2019               Content Version: 12.6  © 0028-8297 [employer]. Care instructions adapted under license by [employer] (which disclaims liability or warranty for this information). If you have questions about a medical condition or this instruction, always ask your healthcare professional. [address] any warranty or liability for your use of this information. Patient Education        Viral Infections: Care Instructions  Your Care Instructions     You don't feel well, but it's not clear what's causing it. You may have a viral infection. Viruses cause many illnesses, such as the common cold, influenza, fever, rashes, and the diarrhea, nausea, and vomiting that are often called \"stomach flu. \" You may wonder if antibiotic medicines could make you feel better. But antibiotics only treat infections caused by bacteria. They don't work on viruses. The good news is that viral infections usually aren't serious. Most will go away in a few days without medical treatment. In the meantime, there are a few things you can do to make yourself more comfortable. Follow-up care is a key part of your treatment and safety. Be sure to make and go to all appointments, and call your doctor if you are having problems.  It's also a good idea to know your test results and keep a list of the medicines you take.  How can you care for yourself at home? · Get plenty of rest if you feel tired. · Take an over-the-counter pain medicine if needed, such as acetaminophen (Tylenol), ibuprofen (Advil, Motrin), or naproxen (Aleve). Read and follow all instructions on the label. · Be careful when taking over-the-counter cold or flu medicines and Tylenol at the same time. Many of these medicines have acetaminophen, which is Tylenol. Read the labels to make sure that you are not taking more than the recommended dose. Too much acetaminophen (Tylenol) can be harmful. · Drink plenty of fluids, enough so that your urine is light yellow or clear like water. If you have kidney, heart, or liver disease and have to limit fluids, talk with your doctor before you increase the amount of fluids you drink. · Stay home from work, school, and other public places while you have a fever. When should you call for help? Call 911 anytime you think you may need emergency care. For example, call if:    · You have severe trouble breathing.     · You passed out (lost consciousness). Call your doctor now or seek immediate medical care if:    · You seem to be getting much sicker.     · You have a new or higher fever.     · You have blood in your stools.     · You have new belly pain, or your pain gets worse.     · You have a new rash. Watch closely for changes in your health, and be sure to contact your doctor if:    · You start to get better and then get worse.     · You do not get better as expected. Where can you learn more? Go to http://rudy-jani.info/  Enter L906 in the search box to learn more about \"Viral Infections: Care Instructions. \"  Current as of: February 11, 2020               Content Version: 12.6  © 1444-4780 [employer]. Care instructions adapted under license by [employer] (which disclaims liability or warranty for this information).  If you have questions about a medical condition or this instruction, always ask your healthcare professional. [place] [address] any warranty or liability for your use of this information.

## 2020-09-06 NOTE — ED TRIAGE NOTES
TRIAGE: Pt arrives w/ c/o generalized ill feeling. +headache, nausea, body aches sine last Tuesday. Pt reports she was just started on prozac and wellbutrin a couple of weeks ago. Denies fever, sob, or being around anyone sick or Covid positive.

## 2021-03-22 ENCOUNTER — APPOINTMENT (OUTPATIENT)
Dept: ULTRASOUND IMAGING | Age: 26
DRG: 420 | End: 2021-03-22
Attending: EMERGENCY MEDICINE
Payer: MEDICAID

## 2021-03-22 ENCOUNTER — APPOINTMENT (OUTPATIENT)
Dept: GENERAL RADIOLOGY | Age: 26
DRG: 420 | End: 2021-03-22
Attending: EMERGENCY MEDICINE
Payer: MEDICAID

## 2021-03-22 ENCOUNTER — HOSPITAL ENCOUNTER (INPATIENT)
Age: 26
LOS: 5 days | Discharge: HOME | DRG: 420 | End: 2021-03-27
Attending: EMERGENCY MEDICINE | Admitting: FAMILY MEDICINE
Payer: MEDICAID

## 2021-03-22 DIAGNOSIS — E11.65 TYPE 2 DIABETES MELLITUS WITH HYPERGLYCEMIA, WITHOUT LONG-TERM CURRENT USE OF INSULIN (HCC): ICD-10-CM

## 2021-03-22 DIAGNOSIS — E13.10 DIABETIC KETOACIDOSIS WITHOUT COMA ASSOCIATED WITH OTHER SPECIFIED DIABETES MELLITUS (HCC): Primary | ICD-10-CM

## 2021-03-22 DIAGNOSIS — R11.2 NAUSEA AND VOMITING, INTRACTABILITY OF VOMITING NOT SPECIFIED, UNSPECIFIED VOMITING TYPE: ICD-10-CM

## 2021-03-22 DIAGNOSIS — K92.0 HEMATEMESIS WITH NAUSEA: ICD-10-CM

## 2021-03-22 DIAGNOSIS — R10.13 ABDOMINAL PAIN, EPIGASTRIC: ICD-10-CM

## 2021-03-22 DIAGNOSIS — E11.10 DIABETIC KETOACIDOSIS WITHOUT COMA ASSOCIATED WITH TYPE 2 DIABETES MELLITUS (HCC): ICD-10-CM

## 2021-03-22 LAB
ADMINISTERED INITIALS, ADMINIT: NORMAL
ALBUMIN SERPL-MCNC: 3.7 G/DL (ref 3.5–5)
ALBUMIN/GLOB SERPL: 0.7 {RATIO} (ref 1.1–2.2)
ALP SERPL-CCNC: 116 U/L (ref 45–117)
ALT SERPL-CCNC: 18 U/L (ref 12–78)
ANION GAP SERPL CALC-SCNC: 23 MMOL/L (ref 5–15)
APPEARANCE UR: CLEAR
AST SERPL-CCNC: 10 U/L (ref 15–37)
BACTERIA URNS QL MICRO: NEGATIVE /HPF
BASOPHILS # BLD: 0 K/UL (ref 0–0.1)
BASOPHILS NFR BLD: 0 % (ref 0–1)
BILIRUB SERPL-MCNC: 0.5 MG/DL (ref 0.2–1)
BILIRUB UR QL: NEGATIVE
BUN SERPL-MCNC: 9 MG/DL (ref 6–20)
BUN/CREAT SERPL: 9 (ref 12–20)
CALCIUM SERPL-MCNC: 8.6 MG/DL (ref 8.5–10.1)
CHLORIDE SERPL-SCNC: 109 MMOL/L (ref 97–108)
CO2 SERPL-SCNC: 6 MMOL/L (ref 21–32)
COLOR UR: ABNORMAL
COMMENT, HOLDF: NORMAL
CREAT SERPL-MCNC: 1.01 MG/DL (ref 0.55–1.02)
D50 ADMINISTERED, D50ADM: 0 ML
D50 ORDER, D50ORD: 0 ML
DIFFERENTIAL METHOD BLD: ABNORMAL
EOSINOPHIL # BLD: 0 K/UL (ref 0–0.4)
EOSINOPHIL NFR BLD: 0 % (ref 0–7)
EPITH CASTS URNS QL MICRO: ABNORMAL /LPF
ERYTHROCYTE [DISTWIDTH] IN BLOOD BY AUTOMATED COUNT: 18.5 % (ref 11.5–14.5)
EST. AVERAGE GLUCOSE BLD GHB EST-MCNC: 326 MG/DL
GLOBULIN SER CALC-MCNC: 5.1 G/DL (ref 2–4)
GLSCOM COMMENTS: NORMAL
GLUCOSE BLD STRIP.AUTO-MCNC: 226 MG/DL (ref 65–100)
GLUCOSE BLD STRIP.AUTO-MCNC: 249 MG/DL (ref 65–100)
GLUCOSE BLD STRIP.AUTO-MCNC: 279 MG/DL (ref 65–100)
GLUCOSE SERPL-MCNC: 402 MG/DL (ref 65–100)
GLUCOSE UR STRIP.AUTO-MCNC: >1000 MG/DL
GLUCOSE, GLC: 226 MG/DL
GLUCOSE, GLC: 249 MG/DL
GLUCOSE, GLC: 279 MG/DL
HBA1C MFR BLD: 13 % (ref 4–5.6)
HCG UR QL: NEGATIVE
HCT VFR BLD AUTO: 47.9 % (ref 35–47)
HGB BLD-MCNC: 15 G/DL (ref 11.5–16)
HGB UR QL STRIP: ABNORMAL
HIGH TARGET, HITG: 250 MG/DL
HYALINE CASTS URNS QL MICRO: ABNORMAL /LPF (ref 0–5)
IMM GRANULOCYTES # BLD AUTO: 0.1 K/UL (ref 0–0.04)
IMM GRANULOCYTES NFR BLD AUTO: 1 % (ref 0–0.5)
INSULIN ADMINSTERED, INSADM: 3.3 UNITS/HOUR
INSULIN ADMINSTERED, INSADM: 3.8 UNITS/HOUR
INSULIN ADMINSTERED, INSADM: 4.4 UNITS/HOUR
INSULIN ORDER, INSORD: 3.3 UNITS/HOUR
INSULIN ORDER, INSORD: 3.8 UNITS/HOUR
INSULIN ORDER, INSORD: 4.4 UNITS/HOUR
KETONES UR QL STRIP.AUTO: >80 MG/DL
LEUKOCYTE ESTERASE UR QL STRIP.AUTO: NEGATIVE
LIPASE SERPL-CCNC: 102 U/L (ref 73–393)
LOW TARGET, LOT: 150 MG/DL
LYMPHOCYTES # BLD: 1.2 K/UL (ref 0.8–3.5)
LYMPHOCYTES NFR BLD: 11 % (ref 12–49)
MCH RBC QN AUTO: 23.8 PG (ref 26–34)
MCHC RBC AUTO-ENTMCNC: 31.3 G/DL (ref 30–36.5)
MCV RBC AUTO: 76 FL (ref 80–99)
MINUTES UNTIL NEXT BG, NBG: 60 MIN
MONOCYTES # BLD: 0.9 K/UL (ref 0–1)
MONOCYTES NFR BLD: 8 % (ref 5–13)
MULTIPLIER, MUL: 0.02
NEUTS SEG # BLD: 8.5 K/UL (ref 1.8–8)
NEUTS SEG NFR BLD: 80 % (ref 32–75)
NITRITE UR QL STRIP.AUTO: NEGATIVE
NRBC # BLD: 0 K/UL (ref 0–0.01)
NRBC BLD-RTO: 0 PER 100 WBC
ORDER INITIALS, ORDINIT: NORMAL
PH UR STRIP: 5 [PH] (ref 5–8)
PHOSPHATE SERPL-MCNC: 3 MG/DL (ref 2.6–4.7)
PLATELET # BLD AUTO: 256 K/UL (ref 150–400)
POTASSIUM SERPL-SCNC: 4.8 MMOL/L (ref 3.5–5.1)
PROT SERPL-MCNC: 8.8 G/DL (ref 6.4–8.2)
PROT UR STRIP-MCNC: 30 MG/DL
RBC # BLD AUTO: 6.3 M/UL (ref 3.8–5.2)
RBC #/AREA URNS HPF: ABNORMAL /HPF (ref 0–5)
RBC MORPH BLD: ABNORMAL
SAMPLES BEING HELD,HOLD: NORMAL
SERVICE CMNT-IMP: ABNORMAL
SODIUM SERPL-SCNC: 138 MMOL/L (ref 136–145)
SP GR UR REFRACTOMETRY: 1.02 (ref 1–1.03)
UR CULT HOLD, URHOLD: NORMAL
UROBILINOGEN UR QL STRIP.AUTO: 0.2 EU/DL (ref 0.2–1)
WBC # BLD AUTO: 10.7 K/UL (ref 3.6–11)
WBC URNS QL MICRO: ABNORMAL /HPF (ref 0–4)

## 2021-03-22 PROCEDURE — 84100 ASSAY OF PHOSPHORUS: CPT

## 2021-03-22 PROCEDURE — 71046 X-RAY EXAM CHEST 2 VIEWS: CPT

## 2021-03-22 PROCEDURE — 74011250636 HC RX REV CODE- 250/636: Performed by: EMERGENCY MEDICINE

## 2021-03-22 PROCEDURE — 83036 HEMOGLOBIN GLYCOSYLATED A1C: CPT

## 2021-03-22 PROCEDURE — 74011000250 HC RX REV CODE- 250: Performed by: FAMILY MEDICINE

## 2021-03-22 PROCEDURE — 81025 URINE PREGNANCY TEST: CPT

## 2021-03-22 PROCEDURE — 36415 COLL VENOUS BLD VENIPUNCTURE: CPT

## 2021-03-22 PROCEDURE — 74011636637 HC RX REV CODE- 636/637: Performed by: FAMILY MEDICINE

## 2021-03-22 PROCEDURE — 65660000001 HC RM ICU INTERMED STEPDOWN

## 2021-03-22 PROCEDURE — 80053 COMPREHEN METABOLIC PANEL: CPT

## 2021-03-22 PROCEDURE — 81001 URINALYSIS AUTO W/SCOPE: CPT

## 2021-03-22 PROCEDURE — 85025 COMPLETE CBC W/AUTO DIFF WBC: CPT

## 2021-03-22 PROCEDURE — 74011636637 HC RX REV CODE- 636/637: Performed by: EMERGENCY MEDICINE

## 2021-03-22 PROCEDURE — 76705 ECHO EXAM OF ABDOMEN: CPT

## 2021-03-22 PROCEDURE — 74011250636 HC RX REV CODE- 250/636: Performed by: FAMILY MEDICINE

## 2021-03-22 PROCEDURE — 83690 ASSAY OF LIPASE: CPT

## 2021-03-22 PROCEDURE — 74011000258 HC RX REV CODE- 258: Performed by: FAMILY MEDICINE

## 2021-03-22 PROCEDURE — 74011000250 HC RX REV CODE- 250: Performed by: EMERGENCY MEDICINE

## 2021-03-22 PROCEDURE — 82962 GLUCOSE BLOOD TEST: CPT

## 2021-03-22 PROCEDURE — 96374 THER/PROPH/DIAG INJ IV PUSH: CPT

## 2021-03-22 PROCEDURE — 82803 BLOOD GASES ANY COMBINATION: CPT

## 2021-03-22 PROCEDURE — 99284 EMERGENCY DEPT VISIT MOD MDM: CPT

## 2021-03-22 PROCEDURE — C9113 INJ PANTOPRAZOLE SODIUM, VIA: HCPCS | Performed by: EMERGENCY MEDICINE

## 2021-03-22 RX ORDER — ACETAMINOPHEN 325 MG/1
650 TABLET ORAL
Status: DISCONTINUED | OUTPATIENT
Start: 2021-03-22 | End: 2021-03-27 | Stop reason: HOSPADM

## 2021-03-22 RX ORDER — POLYETHYLENE GLYCOL 3350 17 G/17G
17 POWDER, FOR SOLUTION ORAL DAILY PRN
Status: DISCONTINUED | OUTPATIENT
Start: 2021-03-22 | End: 2021-03-27 | Stop reason: HOSPADM

## 2021-03-22 RX ORDER — PROMETHAZINE HYDROCHLORIDE 25 MG/1
12.5 TABLET ORAL
Status: DISCONTINUED | OUTPATIENT
Start: 2021-03-22 | End: 2021-03-27 | Stop reason: HOSPADM

## 2021-03-22 RX ORDER — ACETAMINOPHEN 650 MG/1
650 SUPPOSITORY RECTAL
Status: DISCONTINUED | OUTPATIENT
Start: 2021-03-22 | End: 2021-03-27 | Stop reason: HOSPADM

## 2021-03-22 RX ORDER — DEXTROSE 50 % IN WATER (D50W) INTRAVENOUS SYRINGE
25-50 AS NEEDED
Status: DISCONTINUED | OUTPATIENT
Start: 2021-03-22 | End: 2021-03-24 | Stop reason: SDUPTHER

## 2021-03-22 RX ORDER — SODIUM CHLORIDE 0.9 % (FLUSH) 0.9 %
5-40 SYRINGE (ML) INJECTION AS NEEDED
Status: DISCONTINUED | OUTPATIENT
Start: 2021-03-22 | End: 2021-03-27 | Stop reason: HOSPADM

## 2021-03-22 RX ORDER — MAGNESIUM SULFATE 100 %
4 CRYSTALS MISCELLANEOUS AS NEEDED
Status: DISCONTINUED | OUTPATIENT
Start: 2021-03-22 | End: 2021-03-26

## 2021-03-22 RX ORDER — ONDANSETRON 2 MG/ML
4 INJECTION INTRAMUSCULAR; INTRAVENOUS
Status: DISCONTINUED | OUTPATIENT
Start: 2021-03-22 | End: 2021-03-27 | Stop reason: HOSPADM

## 2021-03-22 RX ORDER — SODIUM CHLORIDE 0.9 % (FLUSH) 0.9 %
5-40 SYRINGE (ML) INJECTION EVERY 8 HOURS
Status: DISCONTINUED | OUTPATIENT
Start: 2021-03-22 | End: 2021-03-27 | Stop reason: HOSPADM

## 2021-03-22 RX ORDER — SODIUM CHLORIDE 9 MG/ML
125 INJECTION, SOLUTION INTRAVENOUS CONTINUOUS
Status: DISCONTINUED | OUTPATIENT
Start: 2021-03-22 | End: 2021-03-23

## 2021-03-22 RX ADMIN — FAMOTIDINE 20 MG: 10 INJECTION, SOLUTION INTRAVENOUS at 23:56

## 2021-03-22 RX ADMIN — SODIUM CHLORIDE 125 ML/HR: 9 INJECTION, SOLUTION INTRAVENOUS at 21:49

## 2021-03-22 RX ADMIN — SODIUM CHLORIDE 1000 ML: 9 INJECTION, SOLUTION INTRAVENOUS at 12:01

## 2021-03-22 RX ADMIN — SODIUM CHLORIDE 4.4 UNITS/HR: 9 INJECTION, SOLUTION INTRAVENOUS at 21:49

## 2021-03-22 RX ADMIN — SODIUM CHLORIDE 1000 ML: 9 INJECTION, SOLUTION INTRAVENOUS at 16:16

## 2021-03-22 RX ADMIN — HUMAN INSULIN 10 UNITS: 100 INJECTION, SOLUTION SUBCUTANEOUS at 14:42

## 2021-03-22 RX ADMIN — Medication 10 ML: at 23:57

## 2021-03-22 RX ADMIN — PANTOPRAZOLE SODIUM 40 MG: 40 INJECTION, POWDER, FOR SOLUTION INTRAVENOUS at 12:01

## 2021-03-22 NOTE — ED PROVIDER NOTES
Please note that this dictation was completed with [employer], the [employer] voice recognition software.  Quite often unanticipated grammatical, syntax, homophones, and other interpretive errors are inadvertently transcribed by the computer software.  Please disregard these errors.  Please excuse any errors that have escaped final proofreading. Patient is a 58-year-old female with history of ADHD, depression, presenting to emergency department via EMS for evaluation of 1 week history of nausea and vomiting and right upper quadrant pain with onset today. Patient states her symptoms have been intermittent and significantly worsened today. She had one episode of bloody emesis this morning. She denies fever, chest pain, diarrhea, constipation, bloody stool, urinary changes, vaginal bleeding, or any other medical complaints at this time. Denies history of similar symptoms. Is not followed by gastroenterology. Patient drinks EtOH socially. Past Medical History:   Diagnosis Date    ADHD (attention deficit hyperactivity disorder)     Depression     Mood disorder (HCC)     Second hand smoke exposure        Past Surgical History:   Procedure Laterality Date    HX GYN           History reviewed. No pertinent family history.     Social History     Socioeconomic History    Marital status: SINGLE     Spouse name: Not on file    Number of children: Not on file    Years of education: Not on file    Highest education level: Not on file   Occupational History    Not on file   Social Needs    Financial resource strain: Not on file    Food insecurity     Worry: Not on file     Inability: Not on file    Transportation needs     Medical: Not on file     Non-medical: Not on file   Tobacco Use    Smoking status: Current Every Day Smoker     Packs/day: 0.25    Smokeless tobacco: Never Used   Substance and Sexual Activity    Alcohol use: Not Currently     Comment: socially    Drug use: Yes     Types: Marijuana  Sexual activity: Yes   Lifestyle    Physical activity     Days per week: Not on file     Minutes per session: Not on file    Stress: Not on file   Relationships    Social connections     Talks on phone: Not on file     Gets together: Not on file     Attends Judaism service: Not on file     Active member of club or organization: Not on file     Attends meetings of clubs or organizations: Not on file     Relationship status: Not on file    Intimate partner violence     Fear of current or ex partner: Not on file     Emotionally abused: Not on file     Physically abused: Not on file     Forced sexual activity: Not on file   Other Topics Concern     Service Not Asked    Blood Transfusions Not Asked    Caffeine Concern Not Asked    Occupational Exposure Not Asked   [place] Hazards Not Asked    Sleep Concern Not Asked    Stress Concern Not Asked    Weight Concern Not Asked    Special Diet Not Asked    Back Care Not Asked    Exercise Not Asked    Bike Helmet Not Asked   [address] Not Asked    Self-Exams Not Asked   Social History Narrative    Not on file         ALLERGIES: Patient has no known allergies. Review of Systems   Constitutional: Negative for chills and fever. HENT: Negative for ear pain and sore throat. Eyes: Negative for visual disturbance. Respiratory: Negative for cough and shortness of breath. Cardiovascular: Negative for chest pain. Gastrointestinal: Positive for abdominal pain, nausea and vomiting (hematemesis x 1). Negative for blood in stool and diarrhea. Genitourinary: Negative for dysuria, flank pain and hematuria. Musculoskeletal: Negative for back pain. Skin: Negative for color change. Neurological: Negative for dizziness and headaches. Psychiatric/Behavioral: Negative for confusion. Vitals:    03/22/21 1113   Pulse: (!) 134   Resp: 16   Temp: 97.6 °F (36.4 °C)   SpO2: 95%            Physical Exam  Vitals signs and nursing note reviewed. Constitutional:       General: She is not in acute distress. Appearance: Normal appearance. She is not ill-appearing. HENT:      Head: Normocephalic and atraumatic. Mouth/Throat:      Pharynx: Oropharynx is clear. Eyes:      Extraocular Movements: Extraocular movements intact. Conjunctiva/sclera: Conjunctivae normal.   Neck:      Musculoskeletal: No neck rigidity. Cardiovascular:      Rate and Rhythm: Normal rate and regular rhythm. Pulmonary:      Effort: Pulmonary effort is normal.      Breath sounds: Normal breath sounds. No decreased breath sounds, wheezing, rhonchi or rales. Abdominal:      Palpations: Abdomen is soft. Tenderness: There is abdominal tenderness in the right upper quadrant and epigastric area. There is no right CVA tenderness, left CVA tenderness, guarding or rebound. Negative signs include Patel's sign, Rovsing's sign and McBurney's sign. Musculoskeletal: Normal range of motion. Skin:     General: Skin is warm and dry. Neurological:      General: No focal deficit present. Mental Status: She is alert and oriented to person, place, and time. Psychiatric:         Mood and Affect: Mood normal.          MDM  Number of Diagnoses or Management Options  Abdominal pain, epigastric  Diabetic ketoacidosis without coma associated with other specified diabetes mellitus ([address]  Hematemesis with nausea  Nausea and vomiting, intractability of vomiting not specified, unspecified vomiting type  Diagnosis management comments: Patient is alert, afebrile, tachycardic, remainder of vitals are stable. 1 week history of nausea, vomiting, one episode of hematemesis today with onset of pain today. Abdomen is soft, nondistended, with tenderness in the epigastric and right upper quadrant regions. Plan to obtain CBC, CMP, lipase, urinalysis, chest x-ray, abdominal ultrasound, IV fluids, Protonix, pain control, monitor, reassess.     2:29 PM  Lab work shows hyperglycemia with elevated anion gap and CO2 of 6. VBG ordered. Patient is a nondiabetic but does have history of gestational diabetes. 10 units of subcutaneous insulin and IV fluids given. Abdominal ultrasound shows fatty liver disease, otherwise unremarkable. Patient is admitted to hospital for further work-up, observation, and management. Hospitalist notified and agrees. Attending ED provider is aware of patient and has had the opportunity to personally evaluate the patient, and agrees with admission and current plan. Patient informed of current management plan. All questions answered at this time. Will continue to monitor patient while in ED. Amount and/or Complexity of Data Reviewed  Clinical lab tests: reviewed  Tests in the radiology section of CPT®: reviewed  Tests in the medicine section of CPT®: reviewed  Discuss the patient with other providers: yes      ED Course as of Mar 22 1510   Mon Mar 22, 2021   1249 IMPRESSION  Echogenic liver which suggests fatty infiltration   US ABD LTD [EP]   1454 CBC WITH AUTOMATED DIFF(!):    WBC 10.7   RBC 6.30(!)   HGB 15.0   HCT 47.9(!)   MCV 76.0(!)   MCH 23.8(!)   MCHC 31.3   RDW 18.5(!)   PLATELET 893   NRBC 0.0   ABSOLUTE NRBC 0.00   NEUTROPHILS 80(!)   LYMPHOCYTES 11(!)   MONOCYTES 8   EOSINOPHILS 0   BASOPHILS 0   IMMATURE GRANULOCYTES 1(!)   ABS. NEUTROPHILS 8.5(!)   ABS. LYMPHOCYTES 1.2   ABS. MONOCYTES 0.9   ABS. EOSINOPHILS 0.0   ABS. BASOPHILS 0.0   ABS. IMM. GRANS. 0.1(!)   DF SMEAR SCANNED   RBC COMMENTS ANISOCYTOSIS  1+   [EP]   4020 METABOLIC PANEL, COMPREHENSIVE(!!):    Sodium 138   Potassium 4.8   Chloride 109(!)   CO2 6(!!)   Anion gap 23(!)   Glucose 402(!)   BUN 9   Creatinine 1.01   BUN/Creatinine ratio 9(!)   GFR est AA >60   GFR est non-AA >60   Calcium 8.6   Bilirubin, total 0.5   ALT 18   AST 10(!)   Alk. phosphatase 116   Protein, total 8.8(!)   Albumin 3.7   Globulin 5.1(!)   A-G Ratio 0.7(!) [EP]   1455 Lipase: 102 [EP]   1510 Pulse (Heart Rate)(! ): 116 [EP]   1510 BP: 116/78 [EP]      ED Course User Index  [EP] PRISCILLA Brooks     Perfect Serve Consult for Admission  2:54 PM    ED Room Number: R32/R32  Patient Name and age:  Alfonso oPrter 31 y.o.  female  Working Diagnosis:   1. Diabetic ketoacidosis without coma associated with other specified diabetes mellitus ([facility] [address]    2. Nausea and vomiting, intractability of vomiting not specified, unspecified vomiting type    3. Abdominal pain, epigastric        COVID-19 Suspicion:  no  Sepsis present:  no  Reassessment needed: no  Code Status:  Full Code  Readmission: no  Isolation Requirements:  no  Recommended Level of Care:  step down  Department:[facility] Adult ED - 21   Other: Patient is a 24-year-old otherwise healthy female who presented to ED for evaluation of right upper quadrant abdominal pain, nausea, and vomiting. She had one episode of hematemesis today prior to arrival.  Patient has a history of gestational diabetes, was found to have a glucose of 402, elevated anion gap of 23, and a CO2 of 6. VBG and A1c ordered and pending. Patient receiving IV fluids and I have given her 10 units of IV insulin. Currently tachycardic, vitals otherwise stable.          Procedures

## 2021-03-22 NOTE — ED TRIAGE NOTES
Pt arrives to triage via squad from a friends house, c/o n/v/d x one week  , unsure of pregnancy , denies diarrhea or constipation, pt appears ill, denies fever or chills, +epigastric and right upper abd pain

## 2021-03-22 NOTE — ROUTINE PROCESS
TRANSFER - OUT REPORT: 
 
Verbal report given to Lyndon(name) on Pooja BARTON Sanchez  being transferred to [facility]) for routine progression of care Report consisted of patients Situation, Background, Assessment and  
Recommendations(SBAR). Information from the following report(s) SBAR and MAR was reviewed with the receiving nurse. Lines:  
Peripheral IV 03/22/21 Left Hand (Active) Peripheral IV 03/22/21 Right Antecubital (Active) Site Assessment Clean, dry, & intact 03/22/21 1552 Phlebitis Assessment 0 03/22/21 1552 Infiltration Assessment 0 03/22/21 1552 Dressing Status Clean, dry, & intact 03/22/21 1552 Opportunity for questions and clarification was provided. Patient transported with: 
 [employer]

## 2021-03-22 NOTE — H&P
History & Physical    Primary Care Provider: None  Source of Information: Patient     History of Presenting Illness:   Pooja Sanchez is a 25 y.o. female who presents with abdominal pain    History was probably obtained from the patient    Patient reports that he started experiencing nausea/vomiting for about 1 week, reports that she also started having some abdominal pain associated with her symptoms, patient reports the nausea got worse today she got concerned and decided to come to the hospital.  Patient also reports that she was breathing rapidly.  Patient came to the ER and was found to be in DKA and was requested to be admitted to the hospital service.  Patient reports that she was diagnosed with gestational diabetes while she was pregnant but her blood glucose got better so stopped taking medication for the same.  Patient denies any other complaints or problems    The patient denies any Headache, blurry vision, sore throat, trouble swallowing, trouble with speech, chest pain, SOB, cough, fever, chills, urinary symptoms, constipation, recent travels, sick contacts, focal or generalized neurological symptoms,  falls, injuries, rashes, contact with COVID-19 diagnosed patients, hematemesis, melena, hemoptysis, hematuria, rashes, denies starting any new medications and denies any other concerns or problems besides as mentioned above.         Review of Systems:  All other systems reviewed, pertinent positives and negatives noted in HPI    Past Medical History:   Diagnosis Date   • ADHD (attention deficit hyperactivity disorder)    • Depression    • Mood disorder (HCC)    • Second hand smoke exposure       Past Surgical History:   Procedure Laterality Date   • HX GYN       Prior to Admission medications    Medication Sig Start Date End Date Taking? Authorizing Provider   ondansetron (Zofran ODT) 4 mg disintegrating tablet Take 1 Tab by mouth every eight (8) hours as needed for  Nausea or Vomiting. 9/6/20   Muoio, Alice Gain, NP   ondansetron (Zofran ODT) 4 mg disintegrating tablet Take 1 Tab by mouth every eight (8) hours as needed for Nausea or Vomiting. 9/6/20   Muoio, Alice Gain, NP   buPROPion XL (Wellbutrin XL) 150 mg tablet Take 1 Tab by mouth daily. Indications: major depressive disorder 8/7/20   Nerissa JARVIS NP     No Known Allergies   History reviewed. No pertinent family history. Family history was discussed with the patient, all pertinent and relevant details are mentioned as above, no other pertinent and relevant family history was noted on my discussion with the patient.   Patient specifically denies any history of Gaucher disease in the family  SOCIAL HISTORY:  Patient resides:  Independently X   Assisted Living    SNF    With family care       Smoking history:   None X   Former    Chronic      Alcohol history:   None    Social X   Chronic      Ambulates:   Independently X   w/cane    w/walker    w/wc    CODE STATUS:  DNR    Full X   Other      Objective:     Physical Exam:     Visit Vitals  /78 (BP 1 Location: Left upper arm, BP Patient Position: At rest)   Pulse (!) 116   Temp 98.7 °F (37.1 °C)   Resp 20   SpO2 98%           General : alert x 3, awake, no acute distress, resting in bed, pleasant female, appears to be stated age  [de-identified]: PEERL, EOMI, moist mucus membrane, TM clear  Neck: supple, no JVD, no meningeal signs  Chest: Clear to auscultation bilaterally   CVS: S1 S2 heard, Capillary refill less than 2 seconds  Abd: soft/ Non tender, non distended, BS physiological,   Ext: no clubbing, no cyanosis, no edema, brisk 2+ DP pulses  Neuro/Psych: pleasant mood and affect, CN 2-12 grossly intact, sensory grossly within normal limit, Strength 5/5 in all extremities, DTR 1+ x 4  Skin: warm     EKG: Pending      Data Review:     Recent Days:  Recent Labs     03/22/21  1142   WBC 10.7   HGB 15.0   HCT 47.9*        Recent Labs     03/22/21  1142      K 4.8   CL 109*   CO2 6*   *   BUN 9   CREA 1.01   CA 8.6   ALB 3.7   ALT 18     No results for input(s): PH, PCO2, PO2, HCO3, FIO2 in the last 72 hours. 24 Hour Results:  Recent Results (from the past 24 hour(s))   CBC WITH AUTOMATED DIFF    Collection Time: 03/22/21 11:42 AM   Result Value Ref Range    WBC 10.7 3.6 - 11.0 K/uL    RBC 6.30 (H) 3.80 - 5.20 M/uL    HGB 15.0 11.5 - 16.0 g/dL    HCT 47.9 (H) 35.0 - 47.0 %    MCV 76.0 (L) 80.0 - 99.0 FL    MCH 23.8 (L) 26.0 - 34.0 PG    MCHC 31.3 30.0 - 36.5 g/dL    RDW 18.5 (H) 11.5 - 14.5 %    PLATELET 416 060 - 895 K/uL    NRBC 0.0 0  WBC    ABSOLUTE NRBC 0.00 0.00 - 0.01 K/uL    NEUTROPHILS 80 (H) 32 - 75 %    LYMPHOCYTES 11 (L) 12 - 49 %    MONOCYTES 8 5 - 13 %    EOSINOPHILS 0 0 - 7 %    BASOPHILS 0 0 - 1 %    IMMATURE GRANULOCYTES 1 (H) 0.0 - 0.5 %    ABS. NEUTROPHILS 8.5 (H) 1.8 - 8.0 K/UL    ABS. LYMPHOCYTES 1.2 0.8 - 3.5 K/UL    ABS. MONOCYTES 0.9 0.0 - 1.0 K/UL    ABS. EOSINOPHILS 0.0 0.0 - 0.4 K/UL    ABS. BASOPHILS 0.0 0.0 - 0.1 K/UL    ABS. IMM. GRANS. 0.1 (H) 0.00 - 0.04 K/UL    DF SMEAR SCANNED      RBC COMMENTS ANISOCYTOSIS  1+       METABOLIC PANEL, COMPREHENSIVE    Collection Time: 03/22/21 11:42 AM   Result Value Ref Range    Sodium 138 136 - 145 mmol/L    Potassium 4.8 3.5 - 5.1 mmol/L    Chloride 109 (H) 97 - 108 mmol/L    CO2 6 (LL) 21 - 32 mmol/L    Anion gap 23 (H) 5 - 15 mmol/L    Glucose 402 (H) 65 - 100 mg/dL    BUN 9 6 - 20 MG/DL    Creatinine 1.01 0.55 - 1.02 MG/DL    BUN/Creatinine ratio 9 (L) 12 - 20      GFR est AA >60 >60 ml/min/1.73m2    GFR est non-AA >60 >60 ml/min/1.73m2    Calcium 8.6 8.5 - 10.1 MG/DL    Bilirubin, total 0.5 0.2 - 1.0 MG/DL    ALT (SGPT) 18 12 - 78 U/L    AST (SGOT) 10 (L) 15 - 37 U/L    Alk.  phosphatase 116 45 - 117 U/L    Protein, total 8.8 (H) 6.4 - 8.2 g/dL    Albumin 3.7 3.5 - 5.0 g/dL    Globulin 5.1 (H) 2.0 - 4.0 g/dL    A-G Ratio 0.7 (L) 1.1 - 2.2     LIPASE    Collection Time: 03/22/21 11:42 AM Result Value Ref Range    Lipase 102 73 - 393 U/L   SAMPLES BEING HELD    Collection Time: 21 11:42 AM   Result Value Ref Range    SAMPLES BEING HELD 1RED 1BLU     COMMENT        Add-on orders for these samples will be processed based on acceptable specimen integrity and analyte stability, which may vary by analyte. Imagin [facility]    Result Date: 3/22/2021  Echogenic liver which suggests fatty infiltration    Assessment/Plan     Diabetic ketoacidosis with new onset diabetes mellitus: Patient will be admitted on a stepdown bed, aggressive IV hydration, insulin GTT, VBG, CT of the abdomen pelvis to rule out acute pathology, monitor BMP every 4 hours, replace electrolytes, supportive care and close monitoring, further intervention per hospital course, reassess as needed, once blood glucose stabilizes, will switch to schedule insulin    Depression: Denies suicidal or homicidal ideation, monitor    GI DVT prophylaxis: Patient will be on heparin    Critical care time 49 minutes             Please note that this dictation was completed with Simpleshow, the computer voice recognition software. Quite often unanticipated grammatical, syntax, homophones, and other interpretive errors are inadvertently transcribed by the computer software. Please disregard these errors. Please excuse any errors that have escaped final proofreading.          Signed By: Ayesha Newton MD     2021

## 2021-03-23 LAB
ADMINISTERED INITIALS, ADMINIT: NORMAL
ANION GAP SERPL CALC-SCNC: 11 MMOL/L (ref 5–15)
ANION GAP SERPL CALC-SCNC: 12 MMOL/L (ref 5–15)
ANION GAP SERPL CALC-SCNC: 15 MMOL/L (ref 5–15)
ANION GAP SERPL CALC-SCNC: 16 MMOL/L (ref 5–15)
ARTERIAL PATENCY WRIST A: ABNORMAL
BASE DEFICIT BLD-SCNC: 23 MMOL/L
BASOPHILS # BLD: 0 K/UL (ref 0–0.1)
BASOPHILS NFR BLD: 0 % (ref 0–1)
BDY SITE: ABNORMAL
BUN SERPL-MCNC: 10 MG/DL (ref 6–20)
BUN SERPL-MCNC: 7 MG/DL (ref 6–20)
BUN SERPL-MCNC: 8 MG/DL (ref 6–20)
BUN SERPL-MCNC: 9 MG/DL (ref 6–20)
BUN/CREAT SERPL: 10 (ref 12–20)
BUN/CREAT SERPL: 8 (ref 12–20)
BUN/CREAT SERPL: 8 (ref 12–20)
BUN/CREAT SERPL: 9 (ref 12–20)
CA-I BLD-SCNC: 1.37 MMOL/L (ref 1.12–1.32)
CALCIUM SERPL-MCNC: 9.4 MG/DL (ref 8.5–10.1)
CALCIUM SERPL-MCNC: 9.4 MG/DL (ref 8.5–10.1)
CALCIUM SERPL-MCNC: 9.5 MG/DL (ref 8.5–10.1)
CALCIUM SERPL-MCNC: 9.8 MG/DL (ref 8.5–10.1)
CHLORIDE SERPL-SCNC: 112 MMOL/L (ref 97–108)
CHLORIDE SERPL-SCNC: 112 MMOL/L (ref 97–108)
CHLORIDE SERPL-SCNC: 113 MMOL/L (ref 97–108)
CHLORIDE SERPL-SCNC: 115 MMOL/L (ref 97–108)
CO2 SERPL-SCNC: 10 MMOL/L (ref 21–32)
CO2 SERPL-SCNC: 12 MMOL/L (ref 21–32)
CO2 SERPL-SCNC: 14 MMOL/L (ref 21–32)
CO2 SERPL-SCNC: 9 MMOL/L (ref 21–32)
CREAT SERPL-MCNC: 0.9 MG/DL (ref 0.55–1.02)
CREAT SERPL-MCNC: 0.97 MG/DL (ref 0.55–1.02)
CREAT SERPL-MCNC: 0.98 MG/DL (ref 0.55–1.02)
CREAT SERPL-MCNC: 1.01 MG/DL (ref 0.55–1.02)
D50 ADMINISTERED, D50ADM: 0 ML
D50 ORDER, D50ORD: 0 ML
DIFFERENTIAL METHOD BLD: ABNORMAL
EOSINOPHIL # BLD: 0 K/UL (ref 0–0.4)
EOSINOPHIL NFR BLD: 1 % (ref 0–7)
ERYTHROCYTE [DISTWIDTH] IN BLOOD BY AUTOMATED COUNT: 18.2 % (ref 11.5–14.5)
ERYTHROCYTE [DISTWIDTH] IN BLOOD BY AUTOMATED COUNT: 19.2 % (ref 11.5–14.5)
GAS FLOW.O2 O2 DELIVERY SYS: ABNORMAL L/MIN
GLSCOM COMMENTS: NORMAL
GLUCOSE BLD STRIP.AUTO-MCNC: 183 MG/DL (ref 65–100)
GLUCOSE BLD STRIP.AUTO-MCNC: 185 MG/DL (ref 65–100)
GLUCOSE BLD STRIP.AUTO-MCNC: 206 MG/DL (ref 65–100)
GLUCOSE BLD STRIP.AUTO-MCNC: 207 MG/DL (ref 65–100)
GLUCOSE BLD STRIP.AUTO-MCNC: 217 MG/DL (ref 65–100)
GLUCOSE BLD STRIP.AUTO-MCNC: 220 MG/DL (ref 65–100)
GLUCOSE BLD STRIP.AUTO-MCNC: 221 MG/DL (ref 65–100)
GLUCOSE BLD STRIP.AUTO-MCNC: 224 MG/DL (ref 65–100)
GLUCOSE BLD STRIP.AUTO-MCNC: 230 MG/DL (ref 65–100)
GLUCOSE BLD STRIP.AUTO-MCNC: 241 MG/DL (ref 65–100)
GLUCOSE SERPL-MCNC: 191 MG/DL (ref 65–100)
GLUCOSE SERPL-MCNC: 214 MG/DL (ref 65–100)
GLUCOSE SERPL-MCNC: 220 MG/DL (ref 65–100)
GLUCOSE SERPL-MCNC: 233 MG/DL (ref 65–100)
GLUCOSE, GLC: 183 MG/DL
GLUCOSE, GLC: 186 MG/DL
GLUCOSE, GLC: 206 MG/DL
GLUCOSE, GLC: 207 MG/DL
GLUCOSE, GLC: 217 MG/DL
GLUCOSE, GLC: 220 MG/DL
GLUCOSE, GLC: 221 MG/DL
GLUCOSE, GLC: 224 MG/DL
GLUCOSE, GLC: 230 MG/DL
GLUCOSE, GLC: 241 MG/DL
HCO3 BLD-SCNC: 7.5 MMOL/L (ref 22–26)
HCT VFR BLD AUTO: 39.1 % (ref 35–47)
HCT VFR BLD AUTO: 47.9 % (ref 35–47)
HGB BLD-MCNC: 12.3 G/DL (ref 11.5–16)
HGB BLD-MCNC: 14.8 G/DL (ref 11.5–16)
HIGH TARGET, HITG: 250 MG/DL
IMM GRANULOCYTES # BLD AUTO: 0 K/UL (ref 0–0.04)
IMM GRANULOCYTES NFR BLD AUTO: 0 % (ref 0–0.5)
INSULIN ADMINSTERED, INSADM: 2.5 UNITS/HOUR
INSULIN ADMINSTERED, INSADM: 2.5 UNITS/HOUR
INSULIN ADMINSTERED, INSADM: 2.9 UNITS/HOUR
INSULIN ADMINSTERED, INSADM: 2.9 UNITS/HOUR
INSULIN ADMINSTERED, INSADM: 3.1 UNITS/HOUR
INSULIN ADMINSTERED, INSADM: 3.2 UNITS/HOUR
INSULIN ADMINSTERED, INSADM: 3.3 UNITS/HOUR
INSULIN ADMINSTERED, INSADM: 3.4 UNITS/HOUR
INSULIN ADMINSTERED, INSADM: 3.6 UNITS/HOUR
INSULIN ORDER, INSORD: 2.5 UNITS/HOUR
INSULIN ORDER, INSORD: 2.5 UNITS/HOUR
INSULIN ORDER, INSORD: 2.9 UNITS/HOUR
INSULIN ORDER, INSORD: 2.9 UNITS/HOUR
INSULIN ORDER, INSORD: 3.1 UNITS/HOUR
INSULIN ORDER, INSORD: 3.2 UNITS/HOUR
INSULIN ORDER, INSORD: 3.3 UNITS/HOUR
INSULIN ORDER, INSORD: 3.4 UNITS/HOUR
INSULIN ORDER, INSORD: 3.6 UNITS/HOUR
LOW TARGET, LOT: 150 MG/DL
LYMPHOCYTES # BLD: 2 K/UL (ref 0.8–3.5)
LYMPHOCYTES NFR BLD: 34 % (ref 12–49)
MAGNESIUM SERPL-MCNC: 2 MG/DL (ref 1.6–2.4)
MAGNESIUM SERPL-MCNC: 2.1 MG/DL (ref 1.6–2.4)
MCH RBC QN AUTO: 23.6 PG (ref 26–34)
MCH RBC QN AUTO: 23.8 PG (ref 26–34)
MCHC RBC AUTO-ENTMCNC: 30.9 G/DL (ref 30–36.5)
MCHC RBC AUTO-ENTMCNC: 31.5 G/DL (ref 30–36.5)
MCV RBC AUTO: 75.8 FL (ref 80–99)
MCV RBC AUTO: 76.3 FL (ref 80–99)
MINUTES UNTIL NEXT BG, NBG: 120 MIN
MINUTES UNTIL NEXT BG, NBG: 60 MIN
MINUTES UNTIL NEXT BG, NBG: 60 MIN
MONOCYTES # BLD: 0.9 K/UL (ref 0–1)
MONOCYTES NFR BLD: 15 % (ref 5–13)
MULTIPLIER, MUL: 0.02
NEUTS SEG # BLD: 2.9 K/UL (ref 1.8–8)
NEUTS SEG NFR BLD: 50 % (ref 32–75)
NRBC # BLD: 0 K/UL (ref 0–0.01)
NRBC # BLD: 0 K/UL (ref 0–0.01)
NRBC BLD-RTO: 0 PER 100 WBC
NRBC BLD-RTO: 0 PER 100 WBC
ORDER INITIALS, ORDINIT: NORMAL
PCO2 BLD: 26.1 MMHG (ref 35–45)
PH BLD: 7.07 [PH] (ref 7.35–7.45)
PLATELET # BLD AUTO: 175 K/UL (ref 150–400)
PLATELET # BLD AUTO: 222 K/UL (ref 150–400)
PMV BLD AUTO: ABNORMAL FL (ref 8.9–12.9)
PO2 BLD: 39 MMHG (ref 80–100)
POTASSIUM SERPL-SCNC: 3.2 MMOL/L (ref 3.5–5.1)
POTASSIUM SERPL-SCNC: 4 MMOL/L (ref 3.5–5.1)
POTASSIUM SERPL-SCNC: 4.2 MMOL/L (ref 3.5–5.1)
POTASSIUM SERPL-SCNC: 5.2 MMOL/L (ref 3.5–5.1)
RBC # BLD AUTO: 5.16 M/UL (ref 3.8–5.2)
RBC # BLD AUTO: 6.28 M/UL (ref 3.8–5.2)
SAO2 % BLD: 53 % (ref 92–97)
SERVICE CMNT-IMP: ABNORMAL
SODIUM SERPL-SCNC: 135 MMOL/L (ref 136–145)
SODIUM SERPL-SCNC: 138 MMOL/L (ref 136–145)
SODIUM SERPL-SCNC: 138 MMOL/L (ref 136–145)
SODIUM SERPL-SCNC: 140 MMOL/L (ref 136–145)
SPECIMEN TYPE: ABNORMAL
WBC # BLD AUTO: 10.1 K/UL (ref 3.6–11)
WBC # BLD AUTO: 5.8 K/UL (ref 3.6–11)

## 2021-03-23 PROCEDURE — 80048 BASIC METABOLIC PNL TOTAL CA: CPT

## 2021-03-23 PROCEDURE — 83735 ASSAY OF MAGNESIUM: CPT

## 2021-03-23 PROCEDURE — 82962 GLUCOSE BLOOD TEST: CPT

## 2021-03-23 PROCEDURE — 74011000250 HC RX REV CODE- 250: Performed by: FAMILY MEDICINE

## 2021-03-23 PROCEDURE — 85025 COMPLETE CBC W/AUTO DIFF WBC: CPT

## 2021-03-23 PROCEDURE — 74011636637 HC RX REV CODE- 636/637: Performed by: FAMILY MEDICINE

## 2021-03-23 PROCEDURE — 74011250636 HC RX REV CODE- 250/636: Performed by: INTERNAL MEDICINE

## 2021-03-23 PROCEDURE — 74011250636 HC RX REV CODE- 250/636: Performed by: FAMILY MEDICINE

## 2021-03-23 PROCEDURE — 36415 COLL VENOUS BLD VENIPUNCTURE: CPT

## 2021-03-23 PROCEDURE — 93005 ELECTROCARDIOGRAM TRACING: CPT

## 2021-03-23 PROCEDURE — 74011250637 HC RX REV CODE- 250/637: Performed by: FAMILY MEDICINE

## 2021-03-23 PROCEDURE — 74011250636 HC RX REV CODE- 250/636: Performed by: NURSE PRACTITIONER

## 2021-03-23 PROCEDURE — 74011000258 HC RX REV CODE- 258: Performed by: FAMILY MEDICINE

## 2021-03-23 PROCEDURE — 65660000001 HC RM ICU INTERMED STEPDOWN

## 2021-03-23 PROCEDURE — 74011000258 HC RX REV CODE- 258: Performed by: NURSE PRACTITIONER

## 2021-03-23 PROCEDURE — 85027 COMPLETE CBC AUTOMATED: CPT

## 2021-03-23 RX ORDER — ENOXAPARIN SODIUM 100 MG/ML
40 INJECTION SUBCUTANEOUS EVERY 24 HOURS
Status: DISCONTINUED | OUTPATIENT
Start: 2021-03-23 | End: 2021-03-27 | Stop reason: HOSPADM

## 2021-03-23 RX ORDER — POTASSIUM CHLORIDE 7.45 MG/ML
10 INJECTION INTRAVENOUS
Status: COMPLETED | OUTPATIENT
Start: 2021-03-23 | End: 2021-03-23

## 2021-03-23 RX ORDER — DEXTROSE MONOHYDRATE AND SODIUM CHLORIDE 5; .9 G/100ML; G/100ML
125 INJECTION, SOLUTION INTRAVENOUS CONTINUOUS
Status: DISCONTINUED | OUTPATIENT
Start: 2021-03-23 | End: 2021-03-24

## 2021-03-23 RX ADMIN — Medication 10 ML: at 13:13

## 2021-03-23 RX ADMIN — ACETAMINOPHEN 650 MG: 325 TABLET ORAL at 02:29

## 2021-03-23 RX ADMIN — SODIUM CHLORIDE 2.9 UNITS/HR: 9 INJECTION, SOLUTION INTRAVENOUS at 21:26

## 2021-03-23 RX ADMIN — DEXTROSE AND SODIUM CHLORIDE 125 ML/HR: 5; 900 INJECTION, SOLUTION INTRAVENOUS at 21:26

## 2021-03-23 RX ADMIN — FAMOTIDINE 20 MG: 10 INJECTION, SOLUTION INTRAVENOUS at 22:06

## 2021-03-23 RX ADMIN — ACETAMINOPHEN 650 MG: 325 TABLET ORAL at 13:23

## 2021-03-23 RX ADMIN — DEXTROSE AND SODIUM CHLORIDE 125 ML/HR: 5; 900 INJECTION, SOLUTION INTRAVENOUS at 02:29

## 2021-03-23 RX ADMIN — POTASSIUM CHLORIDE 10 MEQ: 7.46 INJECTION, SOLUTION INTRAVENOUS at 22:03

## 2021-03-23 RX ADMIN — ENOXAPARIN SODIUM 40 MG: 40 INJECTION SUBCUTANEOUS at 13:24

## 2021-03-23 RX ADMIN — FAMOTIDINE 20 MG: 10 INJECTION, SOLUTION INTRAVENOUS at 08:41

## 2021-03-23 RX ADMIN — Medication 5 ML: at 06:00

## 2021-03-23 RX ADMIN — POTASSIUM CHLORIDE 10 MEQ: 7.46 INJECTION, SOLUTION INTRAVENOUS at 22:04

## 2021-03-23 RX ADMIN — SODIUM CHLORIDE 3.2 UNITS/HR: 9 INJECTION, SOLUTION INTRAVENOUS at 18:07

## 2021-03-23 RX ADMIN — Medication 10 ML: at 23:14

## 2021-03-23 RX ADMIN — ACETAMINOPHEN 650 MG: 325 TABLET ORAL at 23:13

## 2021-03-23 RX ADMIN — ONDANSETRON 4 MG: 2 INJECTION INTRAMUSCULAR; INTRAVENOUS at 21:46

## 2021-03-23 NOTE — PROGRESS NOTES
CHRISTIANNE PLAN;  RUR-11%  Disposition-Home in care of her grandmother  Transportation-Family/friend  F/U -PCP/Specialist.    Reason for Admission:  DKA               RUR Score:  RUR-11%                  Plan for utilizing home health:   TBD       PCP: First and Last name: [facility] will assist with a PCP/Endocrinologist   Name of Practice: [facility]   Are you a current patient: Yes/No: TBD   Approximate date of last visit:    Can you participate in a virtual visit with your PCP:                     Current Advanced Directive/Advance Care Plan: Full Code. No. ACP and not interested at this time      Healthcare Decision Maker: Juvencio Larry  Click here to [address] including selection of the Healthcare Decision Maker Relationship (ie \"Primary\")                             Transition of Care Plan:   CM spoke with patient regarding discharge planning. Patient lives with her grandmother in her private residence. She is independent without any assistive devices and gainfully employed. Patient verified her demographics information and did not voice any discharge barriers. Patient did not have a PCP but said she had seen a Dr. Cecilia Canavan at [facility] in the past but would prefer a [facility] PCP for continuity of care. CM notified Sofya with [facility] regarding need for diabetic education, Rochelle Shah said she would also assist with a PCP who specializes in diabetes with [facility]. CM will continue to follow as needed. Mary Jane Wright MSA, RN, CRM  Care Management Interventions  PCP Verified by CM: Yes  Palliative Care Criteria Met (RRAT>21 & CHF Dx)?: No  Mode of Transport at Discharge: Other (see comment)  Transition of Care Consult (CM Consult): Discharge Planning  MyChart Signup: No  Discharge Durable Medical Equipment: No  Health Maintenance Reviewed: Yes  Physical Therapy Consult: No  Occupational Therapy Consult: No  Speech Therapy Consult: No  Current Support Network:  Other(Lives with her grandmother)  Confirm Follow Up Transport: Family  Discharge Location  Discharge Placement: Home with family assistance

## 2021-03-23 NOTE — DIABETES MGMT
[address] [place] [address] NURSE SPECIALIST CONSULT     Initial Presentation   Robbin Toney is a 31 y.o. female who presented to the ED 3/22/21 with a 1 weel complaint of nausea, vomiting and right upper quadrant pain. On evaluation she was found to have hyperglycemia associated with anion gap acidosis and admitted for medical management. HX:   Past Medical History:   Diagnosis Date    ADHD (attention deficit hyperactivity disorder)     Depression     Mood disorder (HCC)     Second hand smoke exposure         DX: DKA    TX: Insulin infusion, IV fluid resuscitation     Hospital course   Clinical progress has been uncomplicated. Diabetes    Patient has a history of gestational diabetes, resolved after birth of her son. Family history positive for diabetes: Dad with Type 2 Diabetes (diagnosed at age 29)   Admission  and A1c 13% indicate poor diabetes control. Consulted by Provider for advanced diabetes nursing assessment and care, specifically related to   [x] Inpatient management strategy  [x] Home management assessment  [x] Survival skill education    Subjective   I am feeling better, I am just really tired.   Patient had gestational diabetes when she was pregnant with her son- born March 2020  Glucose improved after delivery and patient not instructed to follow glucose this year  Does not have a PCP  Does have a history of depression with two inpatient behavioral health admissions: Post-partum depression 8/20 and depression 1/15  Does not take any medications at this time for depression- stopped them about 2-3 months ago  Lives with her Mom  Does not work    Patient reports the following home diabetes self-care practices:  Eating pattern  [x] Breakfast Skips  [x] Lunch  [place] and chips  [x] Dinner  \"dinner foods\"  [x] Bedtime Crackers, cookies  [x] Beverages Water  Physical activity pattern  [x] Aerobic exercise None      Social determinants of health impacting diabetes self-management practices   Struggling with anxiety and/or depression and Concerned that you need to know more about how to stay healthy with diabetes     Admitting A1C 13.0%  Initial   AG 23, now 11  Urine with large ketones     Objective   Physical exam  General Alert, oriented and acutely ill-appearing/tired/weak. Conversant and cooperative. Vital Signs   Visit Vitals  BP (!) 139/91 (BP 1 Location: Left upper arm, BP Patient Position: At rest)   Pulse (!) 110   Temp 98.1 °F (36.7 °C)   Resp 15   Ht 6' (1.829 m)   Wt 122.5 kg (270 lb)   SpO2 100%   BMI 36.62 kg/m²     Skin  Warm and dry. Acanthosis noted along neckline. Heart   Regular rate and rhythm.  No murmurs, rubs or gallops  Lungs  Clear to auscultation without rales or rhonchi  Extremities No foot wounds        Laboratory      CBC W/O DIFF    Collection Time: 03/23/21 12:57 AM   Result Value Ref Range    WBC 10.1 3.6 - 11.0 K/uL    RBC 6.28 (H) 3.80 - 5.20 M/uL    HGB 14.8 11.5 - 16.0 g/dL    HCT 47.9 (H) 35.0 - 47.0 %    MCV 76.3 (L) 80.0 - 99.0 FL    MCH 23.6 (L) 26.0 - 34.0 PG    MCHC 30.9 30.0 - 36.5 g/dL    RDW 19.2 (H) 11.5 - 14.5 %    PLATELET 636 227 - 849 K/uL    NRBC 0.0 0  WBC    ABSOLUTE NRBC 0.00 0.00 - 0.01 K/uL     BMP:   Lab Results   Component Value Date/Time     03/23/2021 04:59 AM    K 4.0 03/23/2021 04:59 AM     (H) 03/23/2021 04:59 AM    CO2 10 (LL) 03/23/2021 04:59 AM    AGAP 15 03/23/2021 04:59 AM     (H) 03/23/2021 04:59 AM    BUN 9 03/23/2021 04:59 AM    CREA 0.97 03/23/2021 04:59 AM    GFRAA >60 03/23/2021 04:59 AM    GFRNA >60 03/23/2021 04:59 AM        Blood glucose pattern        Assessment and Plan   Nursing Diagnosis Risk for unstable blood glucose pattern   Nursing Intervention Domain 5502 Decision-making Support   Nursing Interventions Examined current inpatient diabetes control   Explored factors facilitating and impeding inpatient management  Identified self-management practices impeding diabetes control  Explored corrective strategies with patient and responsible inpatient provider   Informed patient of rational for insulin strategy while hospitalized  Instructed patient in:  Diabetes Education Checklist    Pathophysiology  [x] Diabetes basics  [x] Differences between type 1 and type 2    Diagnostic Criteria  [x] Interpretation of Labs  [x] Hemoglobin A1c  [x] Blood glucose goals  Notes: Goal A1C 7%, patients A1C 13%  Goal glucose under 200    Reducing Risks  [x] Long-term complications of diabetes       Evaluation   Cesar  is a 31year old female with a history of gestational diabetes who presented to the ED with a 1 week c/o of lethargy, polyuria, polydipsia and emesis and found to have hyperglycemia with anion gap acidosis consistent with moderate DKA. Initial blood glucose 402, anion gap 23, urine with large ketones, VBG 7.07/26.1/39. She was fluid resuscitated and started on in insulin infusion with Glucostablizer. DKA resolving and will likely be ready to transition to SQ insulin this evening. Admitting A1C 13% and likely a diagnosis of Type 2 diabetes given family history, large body habitus and dietary recall. Autoimmune diabetes should also be ruled out given degree of ketosis. Recommendations   1. Continue insulin infusion per DKA protocol  2. POC glucose hourly, BMP Q4h on insulin infusion  3. When anion gap closed x2 on BMP, can convert to SQ insulin. When ready to convert  Initiate the subcutaneous insulin order set with:  1. Basal insulin: 0.3 units/kg/day (36 units once daily). Give first dose then turn off insulin gtt two hours later. 2. Correctional insulin ACHS at normal sensitivity, start at a glucose of 200  3. Consistent Carbohydrate diet  4. Adjust to non-dextrose containing IVF  5. Low dose bolus insulin. 0.05 units/kg/meal (6 units/meal). Hold if patient consumes less than 50% of carbohydrates on meal tray  6.  While this is likely Type 2 diabetes, would send CHICO-65 to rule out autoimmune diabetes given degree of ketosis. Discharge Planning   1. Prescription for glucometer kit (Meter, Lancets (100), Strips (100)). Patient to obtain a blood glucose reading four times daily. First thing in the morning prior to eating and drinking anything then before lunch, dinner and bedtime. Create a log and present to PCP for interpretation. 2. Will need to establish care with a PCP within 1-2 weeks after hospital discharge for ongoing diabetes management     3. Start metformin 500mg BID    4. Lantus PEN: Dose TBD    5. Humalog PEN with meals: Dose TBD    6. Pen Needle, Diabetic 32 Gauge x 1/4\" (1 box)  Billing Code(s)   [x] 09018     Before making these care recommendations, I personally reviewed the hosptialization record, including laboratory and diagnostic data, medications and examined the patient at bedside (circumstances permitting).   Total minutes: 48      NIKKI Barba  Diabetes Clinical Nurse Specialist  [facility]  Access via [employer]

## 2021-03-23 NOTE — PROGRESS NOTES
[address] [facility] Adult  Hospitalist Group                                                                                          Hospitalist Progress Note  Martínez Campos MD  Answering service: [phone] OR [phone] from in house phone        Date of Service:  3/23/2021  NAME:  Robbin Toney  :  1995  MRN:  [id number]      Admission Summary:   Robbin Toney is a 31 y.o. female who presents with abdominal pain    Interval history / Subjective:   Patient still feeling quite fatigued, no additional episodes of vomiting. She states that after having her, she did not go back for a glucose tolerance test secondary to Covid. Hemoglobin A1c is 13, discussed likely type 2 diabetes however will send ayah antibodies as well. Reviewed that she will need insulin on discharge regardless. Patient asking about discharge, discussed that her anion gap is quite high still, hopefully as her body comes out of ketosis we can transition over to subcu insulin and get closer to discharge.      Assessment & Plan:     Acute DKA  New onset diabeteslikely type II  -Continue insulin GTT,  - Check BMP every 4 hours  -Replace electrolytes as needed  -Continue D5 IV fluids for now  -Diabetes education team consulted  -Patient will need insulin on discharge  -AYAH antibody sent    Nausea/vomiting  -Suspect secondary to acute DKA  -As needed antiemetics  -Monitor  -Continue famotidine    Depression - no SI/HI, monitor     Code status: FULL  DVT prophylaxis: Lovenox     Care Plan discussed with: Patient/Family  Anticipated Disposition: Home w/Family  Anticipated Discharge: 24 hours to 48 hours, once off insulin gtt, and stable on subQ insulin     Hospital Problems  Date Reviewed: 2015          Codes Class Noted POA    [facility]abetic ketoacidoses) ([facility] ICD-10-CM: E11.10  ICD-9-CM: 250.12  3/22/2021 Unknown                Review of Systems:   A comprehensive review of systems was negative except for that written in the HPI.       Vital Signs:    Last 24hrs VS reviewed since prior progress note. Most recent are:  Visit Vitals  BP (!) 139/91 (BP 1 Location: Left upper arm, BP Patient Position: At rest)   Pulse (!) 110   Temp 98.1 °F (36.7 °C)   Resp 15   Ht 6' (1.829 m)   Wt 122.5 kg (270 lb)   SpO2 100%   BMI 36.62 kg/m²         Intake/Output Summary (Last 24 hours) at 3/23/2021 1311  Last data filed at 3/23/2021 8547  Gross per 24 hour   Intake 616.73 ml   Output    Net 616.73 ml        Physical Examination:     I had a face to face encounter with this patient and independently examined them on 3/23/2021 as outlined below:          Constitutional:  No acute distress, cooperative, pleasant, obese    ENT:  Oral mucosa dry, oropharynx benign. Resp:  CTA bilaterally. No wheezing/rhonchi/rales. No accessory muscle use   CV:  Regular rhythm, normal rate, no murmurs, gallops, rubs    GI:  Soft, non distended, non tender. normoactive bowel sounds, no hepatosplenomegaly     Musculoskeletal:  No edema, warm, 2+ pulses throughout    Neurologic:  Moves all extremities. AAOx3, CN II-XII reviewed     Skin:  Good turgor, no rashes or ulcers       Data Review:    Review and/or order of clinical lab test  Review and/or order of tests in the radiology section of CPT  Review and/or order of tests in the medicine section of CPT      Labs:     Recent Labs     03/23/21  0057 03/22/21  1142   WBC 10.1 10.7   HGB 14.8 15.0   HCT 47.9* 47.9*    256     Recent Labs     03/23/21  0459 03/23/21  0057 03/22/21  1142    138 138   K 4.0 4.2 4.8   * 113* 109*   CO2 10* 9* 6*   BUN 9 10 9   CREA 0.97 0.98 1.01   * 214* 402*   CA 9.5 9.8 8.6   MG 2.0 2.1  --    PHOS  --   --  3.0     Recent Labs     03/22/21  1142   ALT 18      TBILI 0.5   TP 8.8*   ALB 3.7   GLOB 5.1*   LPSE 102     No results for input(s): INR, PTP, APTT, INREXT in the last 72 hours. No results for input(s): FE, TIBC, PSAT, FERR in the last 72 hours.    No results found for: FOL, RBCF   No results for input(s): PH, PCO2, PO2 in the last 72 hours. No results for input(s): CPK, CKNDX, TROIQ in the last 72 hours.     No lab exists for component: CPKMB  Lab Results   Component Value Date/Time    Cholesterol, total 169 08/06/2020 06:17 AM    HDL Cholesterol 47 08/06/2020 06:17 AM    LDL, calculated 101.4 (H) 08/06/2020 06:17 AM    Triglyceride 103 08/06/2020 06:17 AM    CHOL/HDL Ratio 3.6 08/06/2020 06:17 AM     Lab Results   Component Value Date/Time    Glucose (POC) 221 (H) 03/23/2021 11:26 AM    Glucose (POC) 230 (H) 03/23/2021 09:13 AM    Glucose (POC) 220 (H) 03/23/2021 07:09 AM    Glucose (POC) 220 (H) 03/23/2021 04:53 AM    Glucose (POC) 183 (H) 03/23/2021 02:34 AM     Lab Results   Component Value Date/Time    Color YELLOW/STRAW 03/22/2021 04:20 PM    Appearance CLEAR 03/22/2021 04:20 PM    Specific gravity 1.022 03/22/2021 04:20 PM    pH (UA) 5.0 03/22/2021 04:20 PM    Protein 30 (A) 03/22/2021 04:20 PM    Glucose >1,000 (A) 03/22/2021 04:20 PM    Ketone >80 (A) 03/22/2021 04:20 PM    Bilirubin Negative 03/22/2021 04:20 PM    Urobilinogen 0.2 03/22/2021 04:20 PM    Nitrites Negative 03/22/2021 04:20 PM    Leukocyte Esterase Negative 03/22/2021 04:20 PM    Epithelial cells MODERATE (A) 03/22/2021 04:20 PM    Bacteria Negative 03/22/2021 04:20 PM    WBC 0-4 03/22/2021 04:20 PM    RBC 0-5 03/22/2021 04:20 PM         Medications Reviewed:     Current Facility-Administered Medications   Medication Dose Route Frequency    dextrose 5% and 0.9% NaCl infusion  125 mL/hr IntraVENous CONTINUOUS    insulin regular (NOVOLIN R, HUMULIN R) 100 Units in 0.9% sodium chloride 100 mL infusion  0-50 Units/hr IntraVENous TITRATE    glucose chewable tablet 16 g  4 Tab Oral PRN    dextrose (D50W) injection syrg 12.5-25 g  25-50 mL IntraVENous PRN    glucagon (GLUCAGEN) injection 1 mg  1 mg IntraMUSCular PRN    sodium chloride (NS) flush 5-40 mL  5-40 mL IntraVENous Q8H    sodium chloride (NS) flush 5-40 mL  5-40 mL IntraVENous PRN    acetaminophen (TYLENOL) tablet 650 mg  650 mg Oral Q6H PRN    Or    acetaminophen (TYLENOL) suppository 650 mg  650 mg Rectal Q6H PRN    polyethylene glycol (MIRALAX) packet 17 g  17 g Oral DAILY PRN    promethazine (PHENERGAN) tablet 12.5 mg  12.5 mg Oral Q6H PRN    Or    ondansetron (ZOFRAN) injection 4 mg  4 mg IntraVENous Q6H PRN    famotidine (PF) (PEPCID) 20 mg in 0.9% sodium chloride 10 mL injection  20 mg IntraVENous BID     ______________________________________________________________________  EXPECTED LENGTH OF STAY: 2d 21h  ACTUAL LENGTH OF STAY:          1                 Wood Pan MD

## 2021-03-23 NOTE — PROGRESS NOTES
Pt arrived to floor. Pt is alert and oriented x4, responding appropriately. Pt oriented to call light, unit procedures, and plan of care.

## 2021-03-24 LAB
ADMINISTERED INITIALS, ADMINIT: NO
ADMINISTERED INITIALS, ADMINIT: NORMAL
ANION GAP SERPL CALC-SCNC: 10 MMOL/L (ref 5–15)
ANION GAP SERPL CALC-SCNC: 12 MMOL/L (ref 5–15)
ANION GAP SERPL CALC-SCNC: 14 MMOL/L (ref 5–15)
ATRIAL RATE: 92 BPM
ATRIAL RATE: 96 BPM
BASOPHILS # BLD: 0 K/UL (ref 0–0.1)
BASOPHILS NFR BLD: 0 % (ref 0–1)
BUN SERPL-MCNC: 5 MG/DL (ref 6–20)
BUN SERPL-MCNC: 6 MG/DL (ref 6–20)
BUN SERPL-MCNC: 6 MG/DL (ref 6–20)
BUN/CREAT SERPL: 7 (ref 12–20)
BUN/CREAT SERPL: 8 (ref 12–20)
BUN/CREAT SERPL: 9 (ref 12–20)
CALCIUM SERPL-MCNC: 8.6 MG/DL (ref 8.5–10.1)
CALCIUM SERPL-MCNC: 8.8 MG/DL (ref 8.5–10.1)
CALCIUM SERPL-MCNC: 9 MG/DL (ref 8.5–10.1)
CALCULATED P AXIS, ECG09: 58 DEGREES
CALCULATED P AXIS, ECG09: 60 DEGREES
CALCULATED R AXIS, ECG10: 55 DEGREES
CALCULATED R AXIS, ECG10: 56 DEGREES
CALCULATED T AXIS, ECG11: -13 DEGREES
CALCULATED T AXIS, ECG11: -20 DEGREES
CHLORIDE SERPL-SCNC: 110 MMOL/L (ref 97–108)
CHLORIDE SERPL-SCNC: 111 MMOL/L (ref 97–108)
CHLORIDE SERPL-SCNC: 112 MMOL/L (ref 97–108)
CO2 SERPL-SCNC: 11 MMOL/L (ref 21–32)
CO2 SERPL-SCNC: 14 MMOL/L (ref 21–32)
CO2 SERPL-SCNC: 17 MMOL/L (ref 21–32)
CREAT SERPL-MCNC: 0.66 MG/DL (ref 0.55–1.02)
CREAT SERPL-MCNC: 0.68 MG/DL (ref 0.55–1.02)
CREAT SERPL-MCNC: 0.72 MG/DL (ref 0.55–1.02)
D50 ADMINISTERED, D50ADM: 0 ML
D50 ORDER, D50ORD: 0 ML
DIAGNOSIS, 93000: NORMAL
DIAGNOSIS, 93000: NORMAL
DIFFERENTIAL METHOD BLD: ABNORMAL
EOSINOPHIL # BLD: 0.1 K/UL (ref 0–0.4)
EOSINOPHIL NFR BLD: 1 % (ref 0–7)
ERYTHROCYTE [DISTWIDTH] IN BLOOD BY AUTOMATED COUNT: 17.7 % (ref 11.5–14.5)
GLSCOM COMMENTS: NORMAL
GLUCOSE BLD STRIP.AUTO-MCNC: 160 MG/DL (ref 65–100)
GLUCOSE BLD STRIP.AUTO-MCNC: 178 MG/DL (ref 65–100)
GLUCOSE BLD STRIP.AUTO-MCNC: 181 MG/DL (ref 65–100)
GLUCOSE BLD STRIP.AUTO-MCNC: 181 MG/DL (ref 65–100)
GLUCOSE BLD STRIP.AUTO-MCNC: 200 MG/DL (ref 65–100)
GLUCOSE BLD STRIP.AUTO-MCNC: 216 MG/DL (ref 65–100)
GLUCOSE BLD STRIP.AUTO-MCNC: 221 MG/DL (ref 65–100)
GLUCOSE BLD STRIP.AUTO-MCNC: 221 MG/DL (ref 65–100)
GLUCOSE BLD STRIP.AUTO-MCNC: 222 MG/DL (ref 65–100)
GLUCOSE BLD STRIP.AUTO-MCNC: 239 MG/DL (ref 65–100)
GLUCOSE BLD STRIP.AUTO-MCNC: 303 MG/DL (ref 65–100)
GLUCOSE SERPL-MCNC: 183 MG/DL (ref 65–100)
GLUCOSE SERPL-MCNC: 196 MG/DL (ref 65–100)
GLUCOSE SERPL-MCNC: 238 MG/DL (ref 65–100)
GLUCOSE, GLC: 160 MG/DL
GLUCOSE, GLC: 178 MG/DL
GLUCOSE, GLC: 181 MG/DL
GLUCOSE, GLC: 181 MG/DL
GLUCOSE, GLC: 200 MG/DL
GLUCOSE, GLC: 221 MG/DL
GLUCOSE, GLC: 221 MG/DL
GLUCOSE, GLC: 222 MG/DL
GLUCOSE, GLC: 239 MG/DL
HCT VFR BLD AUTO: 36.8 % (ref 35–47)
HGB BLD-MCNC: 11.6 G/DL (ref 11.5–16)
HIGH TARGET, HITG: 250 MG/DL
IMM GRANULOCYTES # BLD AUTO: 0 K/UL (ref 0–0.04)
IMM GRANULOCYTES NFR BLD AUTO: 0 % (ref 0–0.5)
INSULIN ADMINSTERED, INSADM: 2 UNITS/HOUR
INSULIN ADMINSTERED, INSADM: 2.4 UNITS/HOUR
INSULIN ADMINSTERED, INSADM: 2.8 UNITS/HOUR
INSULIN ADMINSTERED, INSADM: 3.2 UNITS/HOUR
INSULIN ADMINSTERED, INSADM: 3.6 UNITS/HOUR
INSULIN ORDER, INSORD: 2 UNITS/HOUR
INSULIN ORDER, INSORD: 2.4 UNITS/HOUR
INSULIN ORDER, INSORD: 2.8 UNITS/HOUR
INSULIN ORDER, INSORD: 3.2 UNITS/HOUR
INSULIN ORDER, INSORD: 3.6 UNITS/HOUR
LOW TARGET, LOT: 150 MG/DL
LYMPHOCYTES # BLD: 2 K/UL (ref 0.8–3.5)
LYMPHOCYTES NFR BLD: 38 % (ref 12–49)
MAGNESIUM SERPL-MCNC: 1.6 MG/DL (ref 1.6–2.4)
MCH RBC QN AUTO: 23.5 PG (ref 26–34)
MCHC RBC AUTO-ENTMCNC: 31.5 G/DL (ref 30–36.5)
MCV RBC AUTO: 74.6 FL (ref 80–99)
MINUTES UNTIL NEXT BG, NBG: 120 MIN
MINUTES UNTIL NEXT BG, NBG: 60 MIN
MONOCYTES # BLD: 0.7 K/UL (ref 0–1)
MONOCYTES NFR BLD: 14 % (ref 5–13)
MULTIPLIER, MUL: 0.02
NEUTS SEG # BLD: 2.5 K/UL (ref 1.8–8)
NEUTS SEG NFR BLD: 47 % (ref 32–75)
NRBC # BLD: 0 K/UL (ref 0–0.01)
NRBC BLD-RTO: 0 PER 100 WBC
ORDER INITIALS, ORDINIT: NO
ORDER INITIALS, ORDINIT: NORMAL
P-R INTERVAL, ECG05: 122 MS
P-R INTERVAL, ECG05: 124 MS
PHOSPHATE SERPL-MCNC: 1.1 MG/DL (ref 2.6–4.7)
PLATELET # BLD AUTO: 153 K/UL (ref 150–400)
POTASSIUM SERPL-SCNC: 2.7 MMOL/L (ref 3.5–5.1)
POTASSIUM SERPL-SCNC: 2.8 MMOL/L (ref 3.5–5.1)
POTASSIUM SERPL-SCNC: 3.3 MMOL/L (ref 3.5–5.1)
Q-T INTERVAL, ECG07: 424 MS
Q-T INTERVAL, ECG07: 432 MS
QRS DURATION, ECG06: 100 MS
QRS DURATION, ECG06: 98 MS
QTC CALCULATION (BEZET), ECG08: 534 MS
QTC CALCULATION (BEZET), ECG08: 535 MS
RBC # BLD AUTO: 4.93 M/UL (ref 3.8–5.2)
SERVICE CMNT-IMP: ABNORMAL
SODIUM SERPL-SCNC: 137 MMOL/L (ref 136–145)
VENTRICULAR RATE, ECG03: 92 BPM
VENTRICULAR RATE, ECG03: 96 BPM
WBC # BLD AUTO: 5.3 K/UL (ref 3.6–11)

## 2021-03-24 PROCEDURE — 80048 BASIC METABOLIC PNL TOTAL CA: CPT

## 2021-03-24 PROCEDURE — 74011000258 HC RX REV CODE- 258: Performed by: INTERNAL MEDICINE

## 2021-03-24 PROCEDURE — 74011250637 HC RX REV CODE- 250/637: Performed by: INTERNAL MEDICINE

## 2021-03-24 PROCEDURE — 74011636637 HC RX REV CODE- 636/637: Performed by: INTERNAL MEDICINE

## 2021-03-24 PROCEDURE — 74011250636 HC RX REV CODE- 250/636: Performed by: INTERNAL MEDICINE

## 2021-03-24 PROCEDURE — 82962 GLUCOSE BLOOD TEST: CPT

## 2021-03-24 PROCEDURE — 36415 COLL VENOUS BLD VENIPUNCTURE: CPT

## 2021-03-24 PROCEDURE — 74011000258 HC RX REV CODE- 258: Performed by: FAMILY MEDICINE

## 2021-03-24 PROCEDURE — 99232 SBSQ HOSP IP/OBS MODERATE 35: CPT | Performed by: CLINICAL NURSE SPECIALIST

## 2021-03-24 PROCEDURE — 94760 N-INVAS EAR/PLS OXIMETRY 1: CPT

## 2021-03-24 PROCEDURE — 84100 ASSAY OF PHOSPHORUS: CPT

## 2021-03-24 PROCEDURE — 83735 ASSAY OF MAGNESIUM: CPT

## 2021-03-24 PROCEDURE — 74011000258 HC RX REV CODE- 258: Performed by: NURSE PRACTITIONER

## 2021-03-24 PROCEDURE — 85025 COMPLETE CBC W/AUTO DIFF WBC: CPT

## 2021-03-24 PROCEDURE — 65660000001 HC RM ICU INTERMED STEPDOWN

## 2021-03-24 PROCEDURE — 74011250637 HC RX REV CODE- 250/637: Performed by: NURSE PRACTITIONER

## 2021-03-24 PROCEDURE — 74011250636 HC RX REV CODE- 250/636: Performed by: NURSE PRACTITIONER

## 2021-03-24 PROCEDURE — 74011636637 HC RX REV CODE- 636/637: Performed by: FAMILY MEDICINE

## 2021-03-24 RX ORDER — INSULIN GLARGINE 100 [IU]/ML
48 INJECTION, SOLUTION SUBCUTANEOUS DAILY
Status: DISCONTINUED | OUTPATIENT
Start: 2021-03-24 | End: 2021-03-24 | Stop reason: ALTCHOICE

## 2021-03-24 RX ORDER — DEXTROSE 50 % IN WATER (D50W) INTRAVENOUS SYRINGE
25-50 AS NEEDED
Status: DISCONTINUED | OUTPATIENT
Start: 2021-03-24 | End: 2021-03-27 | Stop reason: HOSPADM

## 2021-03-24 RX ORDER — PANTOPRAZOLE SODIUM 40 MG/1
40 TABLET, DELAYED RELEASE ORAL
Status: DISCONTINUED | OUTPATIENT
Start: 2021-03-24 | End: 2021-03-24

## 2021-03-24 RX ORDER — MAGNESIUM SULFATE HEPTAHYDRATE 40 MG/ML
2 INJECTION, SOLUTION INTRAVENOUS ONCE
Status: COMPLETED | OUTPATIENT
Start: 2021-03-24 | End: 2021-03-24

## 2021-03-24 RX ORDER — MAGNESIUM SULFATE 1 G/100ML
1 INJECTION INTRAVENOUS ONCE
Status: COMPLETED | OUTPATIENT
Start: 2021-03-24 | End: 2021-03-24

## 2021-03-24 RX ORDER — POTASSIUM CHLORIDE 7.45 MG/ML
10 INJECTION INTRAVENOUS
Status: DISCONTINUED | OUTPATIENT
Start: 2021-03-24 | End: 2021-03-24

## 2021-03-24 RX ORDER — INSULIN LISPRO 100 [IU]/ML
6 INJECTION, SOLUTION INTRAVENOUS; SUBCUTANEOUS
Status: DISCONTINUED | OUTPATIENT
Start: 2021-03-24 | End: 2021-03-24 | Stop reason: ALTCHOICE

## 2021-03-24 RX ORDER — SODIUM CHLORIDE 9 MG/ML
125 INJECTION, SOLUTION INTRAVENOUS CONTINUOUS
Status: DISCONTINUED | OUTPATIENT
Start: 2021-03-24 | End: 2021-03-24 | Stop reason: ALTCHOICE

## 2021-03-24 RX ORDER — DEXTROSE MONOHYDRATE AND SODIUM CHLORIDE 5; .9 G/100ML; G/100ML
125 INJECTION, SOLUTION INTRAVENOUS CONTINUOUS
Status: DISPENSED | OUTPATIENT
Start: 2021-03-24 | End: 2021-03-24

## 2021-03-24 RX ORDER — INSULIN GLARGINE 100 [IU]/ML
40 INJECTION, SOLUTION SUBCUTANEOUS DAILY
Status: DISCONTINUED | OUTPATIENT
Start: 2021-03-24 | End: 2021-03-25

## 2021-03-24 RX ORDER — SODIUM,POTASSIUM PHOSPHATES 280-250MG
2 POWDER IN PACKET (EA) ORAL EVERY 4 HOURS
Status: COMPLETED | OUTPATIENT
Start: 2021-03-24 | End: 2021-03-25

## 2021-03-24 RX ORDER — SODIUM CHLORIDE 9 MG/ML
125 INJECTION, SOLUTION INTRAVENOUS CONTINUOUS
Status: DISCONTINUED | OUTPATIENT
Start: 2021-03-24 | End: 2021-03-26

## 2021-03-24 RX ORDER — INSULIN LISPRO 100 [IU]/ML
INJECTION, SOLUTION INTRAVENOUS; SUBCUTANEOUS
Status: DISCONTINUED | OUTPATIENT
Start: 2021-03-24 | End: 2021-03-24 | Stop reason: ALTCHOICE

## 2021-03-24 RX ORDER — FAMOTIDINE 20 MG/1
20 TABLET, FILM COATED ORAL 2 TIMES DAILY
Status: DISCONTINUED | OUTPATIENT
Start: 2021-03-24 | End: 2021-03-27 | Stop reason: HOSPADM

## 2021-03-24 RX ADMIN — INSULIN GLARGINE 40 UNITS: 100 INJECTION, SOLUTION SUBCUTANEOUS at 14:00

## 2021-03-24 RX ADMIN — SODIUM CHLORIDE 2 UNITS/HR: 9 INJECTION, SOLUTION INTRAVENOUS at 07:56

## 2021-03-24 RX ADMIN — INSULIN LISPRO 4 UNITS: 100 INJECTION, SOLUTION INTRAVENOUS; SUBCUTANEOUS at 21:33

## 2021-03-24 RX ADMIN — POTASSIUM & SODIUM PHOSPHATES POWDER PACK 280-160-250 MG 2 PACKET: 280-160-250 PACK at 15:58

## 2021-03-24 RX ADMIN — Medication 10 ML: at 05:16

## 2021-03-24 RX ADMIN — SODIUM CHLORIDE 125 ML/HR: 9 INJECTION, SOLUTION INTRAVENOUS at 16:00

## 2021-03-24 RX ADMIN — Medication 10 ML: at 21:32

## 2021-03-24 RX ADMIN — DEXTROSE AND SODIUM CHLORIDE 125 ML/HR: 5; 900 INJECTION, SOLUTION INTRAVENOUS at 06:19

## 2021-03-24 RX ADMIN — ENOXAPARIN SODIUM 40 MG: 40 INJECTION SUBCUTANEOUS at 15:59

## 2021-03-24 RX ADMIN — INSULIN LISPRO 3 UNITS: 100 INJECTION, SOLUTION INTRAVENOUS; SUBCUTANEOUS at 16:18

## 2021-03-24 RX ADMIN — MAGNESIUM SULFATE HEPTAHYDRATE 2 G: 40 INJECTION, SOLUTION INTRAVENOUS at 14:20

## 2021-03-24 RX ADMIN — FAMOTIDINE 20 MG: 20 TABLET, FILM COATED ORAL at 16:19

## 2021-03-24 RX ADMIN — POTASSIUM & SODIUM PHOSPHATES POWDER PACK 280-160-250 MG 2 PACKET: 280-160-250 PACK at 21:32

## 2021-03-24 RX ADMIN — POTASSIUM CHLORIDE 10 MEQ: 7.46 INJECTION, SOLUTION INTRAVENOUS at 05:15

## 2021-03-24 RX ADMIN — POTASSIUM BICARBONATE 40 MEQ: 391 TABLET, EFFERVESCENT ORAL at 07:49

## 2021-03-24 RX ADMIN — PANTOPRAZOLE SODIUM 40 MG: 40 TABLET, DELAYED RELEASE ORAL at 07:49

## 2021-03-24 RX ADMIN — MAGNESIUM SULFATE HEPTAHYDRATE 1 G: 1 INJECTION, SOLUTION INTRAVENOUS at 15:52

## 2021-03-24 RX ADMIN — SODIUM CHLORIDE 3.2 UNITS/HR: 9 INJECTION, SOLUTION INTRAVENOUS at 14:02

## 2021-03-24 NOTE — DIABETES MGMT
[address] [place] [address] NURSE SPECIALIST CONSULT     Initial Presentation   Branden Serrano is a 31 y.o. female who presented to the ED 3/22/21 with a 1 weel complaint of nausea, vomiting and right upper quadrant pain. On evaluation she was found to have hyperglycemia associated with anion gap acidosis and admitted for medical management. HX:   Past Medical History:   Diagnosis Date    ADHD (attention deficit hyperactivity disorder)     Depression     Mood disorder (HCC)     Second hand smoke exposure         DX: DKA    TX: Insulin infusion, IV fluid resuscitation     Hospital course   Clinical progress has been uncomplicated. Diabetes    Patient has a history of gestational diabetes, resolved after birth of her son. Family history positive for diabetes: Dad with Type 2 Diabetes (diagnosed at age 29)   Admission  and A1c 13% indicate poor diabetes control. Consulted by Provider for advanced diabetes nursing assessment and care, specifically related to   [x] Inpatient management strategy  [x] Home management assessment  [x] Survival skill education    Subjective   My head really hurts.     Remains on insulin gtt  CO2 14  AG 12  GFR over 60  K 2.8  Required 72 units insulin on gtt yesterday  Off IVF  Objective   Physical exam  General Tired, oriented and acutely ill-appearing/tired/weak. Conversant and cooperative. Vital Signs   Visit Vitals  /69 (BP 1 Location: Left upper arm, BP Patient Position: At rest)   Pulse 90   Temp 97.5 °F (36.4 °C)   Resp 13   Ht 6' (1.829 m)   Wt 123.8 kg (272 lb 14.9 oz)   SpO2 98%   BMI 37.02 kg/m²     Skin  Warm and dry. Acanthosis noted along neckline. Heart   Regular rate and rhythm.  No murmurs, rubs or gallops  Lungs  Clear to auscultation without rales or rhonchi  Extremities No foot wounds        Laboratory      CBC WITH AUTOMATED DIFF    Collection Time: 03/24/21  3:03 AM   Result Value Ref Range    WBC 5.3 3.6 - 11.0 K/uL    RBC 4.93 3.80 - 5.20 M/uL    HGB 11.6 11.5 - 16.0 g/dL    HCT 36.8 35.0 - 47.0 %    MCV 74.6 (L) 80.0 - 99.0 FL    MCH 23.5 (L) 26.0 - 34.0 PG    MCHC 31.5 30.0 - 36.5 g/dL    RDW 17.7 (H) 11.5 - 14.5 %    PLATELET 905 320 - 939 K/uL    NRBC 0.0 0  WBC    ABSOLUTE NRBC 0.00 0.00 - 0.01 K/uL    NEUTROPHILS 47 32 - 75 %    LYMPHOCYTES 38 12 - 49 %    MONOCYTES 14 (H) 5 - 13 %    EOSINOPHILS 1 0 - 7 %    BASOPHILS 0 0 - 1 %    IMMATURE GRANULOCYTES 0 0.0 - 0.5 %    ABS. NEUTROPHILS 2.5 1.8 - 8.0 K/UL    ABS. LYMPHOCYTES 2.0 0.8 - 3.5 K/UL    ABS. MONOCYTES 0.7 0.0 - 1.0 K/UL    ABS. EOSINOPHILS 0.1 0.0 - 0.4 K/UL    ABS. BASOPHILS 0.0 0.0 - 0.1 K/UL    ABS. IMM. GRANS. 0.0 0.00 - 0.04 K/UL    DF AUTOMATED       BMP:   Lab Results   Component Value Date/Time     03/24/2021 07:27 AM    K 2.8 (L) 03/24/2021 07:27 AM     (H) 03/24/2021 07:27 AM    CO2 14 (LL) 03/24/2021 07:27 AM    AGAP 12 03/24/2021 07:27 AM     (H) 03/24/2021 07:27 AM    BUN 6 03/24/2021 07:27 AM    CREA 0.66 03/24/2021 07:27 AM    GFRAA >60 03/24/2021 07:27 AM    GFRNA >60 03/24/2021 07:27 AM        Blood glucose pattern        Assessment and Plan   Nursing Diagnosis Risk for unstable blood glucose pattern   Nursing Intervention Domain 9830 Decision-making Support   Nursing Interventions Examined current inpatient diabetes control   Explored factors facilitating and impeding inpatient management  Identified self-management practices impeding diabetes control  Explored corrective strategies with patient and responsible inpatient provider   Informed patient of rational for insulin strategy while hospitalized       Evaluation   Meka Boyd is a 31year old female with a history of gestational diabetes who presented to the ED with a 1 week c/o of lethargy, polyuria, polydipsia and emesis and found to have hyperglycemia with anion gap acidosis consistent with moderate DKA.   Initial blood glucose 402, anion gap 23, urine with large ketones, VBG 7.07/26.1/39. She was fluid resuscitated and started on in insulin infusion with Glucostablizer. DKA resolving and will likely be ready to transition to SQ insulin today. Admitting A1C 13% and likely a diagnosis of Type 2 diabetes given family history, large body habitus and dietary recall. Autoimmune diabetes should also be ruled out given degree of ketosis. Recommendations   1. Continue insulin infusion per DKA protocol  2. POC glucose hourly, BMP Q4h on insulin infusion  3. When anion gap closed x2 on BMP, can convert to SQ insulin. 4. Resume IVF per order    When ready to convert  Initiate the subcutaneous insulin order set with:  1. Basal insulin: 0.4 units/kg/day (50 units once daily). Give first dose then turn off insulin gtt two hours later. 2. Correctional insulin ACHS at normal sensitivity, start at a glucose of 200  3. Consistent Carbohydrate diet  4. Adjust to non-dextrose containing IVF  5. Moderate dose bolus insulin. 0.075 units/kg/meal (8 units/meal). Hold if patient consumes less than 50% of carbohydrates on meal tray  6. CHICO-65 ordered but not sent- Send with next set of labs     Discharge Planning   1. Prescription for glucometer kit (Meter, Lancets (100), Strips (100)). Patient to obtain a blood glucose reading four times daily. First thing in the morning prior to eating and drinking anything then before lunch, dinner and bedtime. Create a log and present to PCP for interpretation. 2. Will need to establish care with a PCP within 1-2 weeks after hospital discharge for ongoing diabetes management     3. Start metformin 500mg BID    4. Lantus PEN: Dose TBD    5. Humalog PEN with meals: Dose TBD    6.  Pen Needle, Diabetic 32 Gauge x 1/4\" (1 box)  Billing Code(s)   [x] 35258    Before making these care recommendations, I personally reviewed the hosptialization record, including laboratory and diagnostic data, medications and examined the patient at bedside (circumstances permitting).   Total minutes: 25      NKIKI Huddleston  Diabetes Clinical Nurse Specialist  [facility]  Access via [employer]

## 2021-03-24 NOTE — PROGRESS NOTES
2200: IV potassium ordered during day shift (before night shift at [facility]) due to potassium level of 3.2. RN unsuccessfully attempted to get a second IV site. CCU and critical care called for assistance with second IV and were not available. Charge nurse attempted IV and was successful. 2 runs of IV potassium run with NS y-sited running at 5ml/hr and minimal complaints of burning. 0531 : Pt potassium level is now 2.7. Nighttime hospitalist NP notified and ordered 3 runs of IV potassium. RN attempted to run infusion and pt complained of severe burning, was crying in pain, and asking us to stop. RN attempted to add NS at 5mL/hr y-sited into IV. Pt still crying in pain. RN increased rate by 5ml/hr increments until NS was flowing at 35 ml/hr. Pt still had no relief. RN paused infusion and notified nighttime hospitalist NP. Nighttime hospitalist NP ordered to add k-pad to help ease the burning sensation. RN, attempted to locate K-pad. RN called nursing supervisor, CCU, [place] [id number], [facility], [facility], [facility], none of the units have k-pad or can help us locate the k-pad (we have the machine, we cannot locate the pad). RN attemped to run potassium with ordered maintenance fluid D5 NS at 125 while attemping to locate k-pad and pt still complained of severe burning. Pt in pain to the point of tears. IV site appeared to be swollen, although still flushable, and still had blood return. Pt now refusing IV potassium. Pt educated on IV potassium and the risks of refusing potassium. Pt educated that another IV may have to be placed to run the potassium. RN disconnected IV and notified nighttime hospitalist NP.     4924: Nighttime hospitalist NP changed order to PO potassium stating \"changing to PO, adding protonix. Hope it doesn't make her sick. Pt NEEDS an IV, that is not negotiable. \"  Will give PO potassium and notify AM shift of eventful night and the need for another IV.

## 2021-03-24 NOTE — PROGRESS NOTES
Bedside shift change report given to [address] [place] [address] (oncoming nurse) by Anton Dennis (offgoing nurse). Report included the following information SBAR, Kardex, ED Summary, MAR, Recent Results and Cardiac Rhythm NSR.

## 2021-03-24 NOTE — PROGRESS NOTES
Clinical Pharmacy Note: IV to PO Automatic Conversion  Please note: Pooja Mcleod medication- famotidine has been changed from IV to PO based on the following critiera:    Patient is taking scheduled oral medications  Patient is tolerating tube feeds at goal rate or a full liquid, soft or regular diet    This IV to PO conversion is based on the [employer] approved automatic conversion policy for eligible patients. Please call with questions.

## 2021-03-24 NOTE — PROGRESS NOTES
Critical lab results taken for primary nurse by Jazmin Medina RN. CO2 14. Primary nurse made aware by Jazmin Medina RN.

## 2021-03-24 NOTE — PROGRESS NOTES
Hospital follow-up new PCP Virtual transitional care appointment has been scheduled with Dr. Shaye Marrero for Wednesday, 3/31/21 at 1:00 p.m. Pending patient discharge.   Jose Henriquez, Care Management Specialist.

## 2021-03-24 NOTE — PROGRESS NOTES
Clinical Pharmacy Note: Stress Ulcer Prophylaxis Protocol (Non-ICU patients)    Please note that stress ulcer prophylaxis is not indicated for patients outside of the ICU. Ton Beard has an order for pantoprazole that has been ordered with an indication of stress ulcer prophylaxis.   No other indication for pantoprazole has been noted in the patients chart and therefore it has been discontinued per the [facility] Stress Ulcer Prophylaxis Protocol for eligible patients. Please call with any questions.

## 2021-03-24 NOTE — PROGRESS NOTES
Admission Medication Reconciliation:    Information obtained from:  Patient  RxQuery data available¹:  NO    Comments/Recommendations: Updated PTA meds/reviewed patient's allergies. 1)  Patient reports no active medications    2)  Medication changes (since last review):    Removed  - wellbutrin  -zofran         ¹RxQuery pharmacy benefit data reflects medications filled and processed through the patient's insurance, however   this data does NOT capture whether the medication was picked up or is currently being taken by the patient. Allergies:  Patient has no known allergies. Significant PMH/Disease States:   Past Medical History:   Diagnosis Date    ADHD (attention deficit hyperactivity disorder)     Depression     Mood disorder ([facility] [address]     Second hand smoke exposure      Chief Complaint for this Admission:    Chief Complaint   Patient presents with    Vomiting     Prior to Admission Medications:   None     Please contact the main inpatient pharmacy with any questions or concerns at [phone] and we will direct you to the clinical pharmacist covering this patient's care while in-house.    Oleksandr Thompson, RAMAKRISHNAD

## 2021-03-24 NOTE — PROGRESS NOTES
Problem: Diabetes Self-Management  Goal: *Disease process and treatment process  Description: Define diabetes and identify own type of diabetes; list 3 options for treating diabetes.  Outcome: Progressing Towards Goal  Goal: *Incorporating physical activity into lifestyle  Description: State effect of exercise on blood glucose levels.  Outcome: Progressing Towards Goal  Goal: *Monitoring blood glucose, interpreting and using results  Description: Identify recommended blood glucose targets  and personal targets.  Outcome: Progressing Towards Goal     Problem: Patient Education: Go to Patient Education Activity  Goal: Patient/Family Education  Outcome: Progressing Towards Goal     Problem: Falls - Risk of  Goal: *Absence of Falls  Description: Document Neftali Fall Risk and appropriate interventions in the flowsheet.  Outcome: Progressing Towards Goal  Note: Fall Risk Interventions:            Medication Interventions: Evaluate medications/consider consulting pharmacy

## 2021-03-25 LAB
ANION GAP SERPL CALC-SCNC: 12 MMOL/L (ref 5–15)
ANION GAP SERPL CALC-SCNC: 14 MMOL/L (ref 5–15)
ANION GAP SERPL CALC-SCNC: 9 MMOL/L (ref 5–15)
ANION GAP SERPL CALC-SCNC: 9 MMOL/L (ref 5–15)
BUN SERPL-MCNC: 4 MG/DL (ref 6–20)
BUN SERPL-MCNC: 4 MG/DL (ref 6–20)
BUN SERPL-MCNC: 5 MG/DL (ref 6–20)
BUN SERPL-MCNC: 5 MG/DL (ref 6–20)
BUN/CREAT SERPL: 10 (ref 12–20)
BUN/CREAT SERPL: 7 (ref 12–20)
BUN/CREAT SERPL: 7 (ref 12–20)
BUN/CREAT SERPL: 8 (ref 12–20)
CALCIUM SERPL-MCNC: 8.1 MG/DL (ref 8.5–10.1)
CALCIUM SERPL-MCNC: 8.3 MG/DL (ref 8.5–10.1)
CALCIUM SERPL-MCNC: 8.4 MG/DL (ref 8.5–10.1)
CALCIUM SERPL-MCNC: 8.9 MG/DL (ref 8.5–10.1)
CHLORIDE SERPL-SCNC: 109 MMOL/L (ref 97–108)
CHLORIDE SERPL-SCNC: 109 MMOL/L (ref 97–108)
CHLORIDE SERPL-SCNC: 110 MMOL/L (ref 97–108)
CHLORIDE SERPL-SCNC: 112 MMOL/L (ref 97–108)
CO2 SERPL-SCNC: 15 MMOL/L (ref 21–32)
CO2 SERPL-SCNC: 18 MMOL/L (ref 21–32)
CO2 SERPL-SCNC: 20 MMOL/L (ref 21–32)
CO2 SERPL-SCNC: 21 MMOL/L (ref 21–32)
COMMENT, HOLDF: NORMAL
CREAT SERPL-MCNC: 0.52 MG/DL (ref 0.55–1.02)
CREAT SERPL-MCNC: 0.55 MG/DL (ref 0.55–1.02)
CREAT SERPL-MCNC: 0.58 MG/DL (ref 0.55–1.02)
CREAT SERPL-MCNC: 0.66 MG/DL (ref 0.55–1.02)
GLUCOSE BLD STRIP.AUTO-MCNC: 137 MG/DL (ref 65–100)
GLUCOSE BLD STRIP.AUTO-MCNC: 209 MG/DL (ref 65–100)
GLUCOSE BLD STRIP.AUTO-MCNC: 211 MG/DL (ref 65–100)
GLUCOSE BLD STRIP.AUTO-MCNC: 212 MG/DL (ref 65–100)
GLUCOSE SERPL-MCNC: 149 MG/DL (ref 65–100)
GLUCOSE SERPL-MCNC: 227 MG/DL (ref 65–100)
GLUCOSE SERPL-MCNC: 231 MG/DL (ref 65–100)
GLUCOSE SERPL-MCNC: 237 MG/DL (ref 65–100)
MAGNESIUM SERPL-MCNC: 1.8 MG/DL (ref 1.6–2.4)
MAGNESIUM SERPL-MCNC: 1.9 MG/DL (ref 1.6–2.4)
MAGNESIUM SERPL-MCNC: 1.9 MG/DL (ref 1.6–2.4)
MAGNESIUM SERPL-MCNC: 2.1 MG/DL (ref 1.6–2.4)
PHOSPHATE SERPL-MCNC: 1 MG/DL (ref 2.6–4.7)
PHOSPHATE SERPL-MCNC: 1 MG/DL (ref 2.6–4.7)
PHOSPHATE SERPL-MCNC: 1.5 MG/DL (ref 2.6–4.7)
POTASSIUM SERPL-SCNC: 2.9 MMOL/L (ref 3.5–5.1)
POTASSIUM SERPL-SCNC: 3 MMOL/L (ref 3.5–5.1)
POTASSIUM SERPL-SCNC: 3.2 MMOL/L (ref 3.5–5.1)
POTASSIUM SERPL-SCNC: 3.2 MMOL/L (ref 3.5–5.1)
SAMPLES BEING HELD,HOLD: NORMAL
SERVICE CMNT-IMP: ABNORMAL
SODIUM SERPL-SCNC: 138 MMOL/L (ref 136–145)
SODIUM SERPL-SCNC: 138 MMOL/L (ref 136–145)
SODIUM SERPL-SCNC: 140 MMOL/L (ref 136–145)
SODIUM SERPL-SCNC: 142 MMOL/L (ref 136–145)

## 2021-03-25 PROCEDURE — 74011250636 HC RX REV CODE- 250/636: Performed by: INTERNAL MEDICINE

## 2021-03-25 PROCEDURE — 80048 BASIC METABOLIC PNL TOTAL CA: CPT

## 2021-03-25 PROCEDURE — 94760 N-INVAS EAR/PLS OXIMETRY 1: CPT

## 2021-03-25 PROCEDURE — 82962 GLUCOSE BLOOD TEST: CPT

## 2021-03-25 PROCEDURE — 36415 COLL VENOUS BLD VENIPUNCTURE: CPT

## 2021-03-25 PROCEDURE — 87186 SC STD MICRODIL/AGAR DIL: CPT

## 2021-03-25 PROCEDURE — 87077 CULTURE AEROBIC IDENTIFY: CPT

## 2021-03-25 PROCEDURE — 87205 SMEAR GRAM STAIN: CPT

## 2021-03-25 PROCEDURE — 74011636637 HC RX REV CODE- 636/637: Performed by: INTERNAL MEDICINE

## 2021-03-25 PROCEDURE — 83735 ASSAY OF MAGNESIUM: CPT

## 2021-03-25 PROCEDURE — 74011250637 HC RX REV CODE- 250/637: Performed by: INTERNAL MEDICINE

## 2021-03-25 PROCEDURE — 86341 ISLET CELL ANTIBODY: CPT

## 2021-03-25 PROCEDURE — 65660000001 HC RM ICU INTERMED STEPDOWN

## 2021-03-25 PROCEDURE — 84100 ASSAY OF PHOSPHORUS: CPT

## 2021-03-25 PROCEDURE — 99232 SBSQ HOSP IP/OBS MODERATE 35: CPT | Performed by: CLINICAL NURSE SPECIALIST

## 2021-03-25 RX ORDER — INSULIN LISPRO 100 [IU]/ML
INJECTION, SOLUTION INTRAVENOUS; SUBCUTANEOUS
Status: DISCONTINUED | OUTPATIENT
Start: 2021-03-25 | End: 2021-03-25

## 2021-03-25 RX ORDER — POTASSIUM CHLORIDE 750 MG/1
40 TABLET, FILM COATED, EXTENDED RELEASE ORAL
Status: COMPLETED | OUTPATIENT
Start: 2021-03-25 | End: 2021-03-25

## 2021-03-25 RX ORDER — MUPIROCIN 20 MG/G
OINTMENT TOPICAL DAILY
Status: DISCONTINUED | OUTPATIENT
Start: 2021-03-26 | End: 2021-03-27 | Stop reason: HOSPADM

## 2021-03-25 RX ORDER — INSULIN LISPRO 100 [IU]/ML
INJECTION, SOLUTION INTRAVENOUS; SUBCUTANEOUS
Status: DISCONTINUED | OUTPATIENT
Start: 2021-03-25 | End: 2021-03-27 | Stop reason: HOSPADM

## 2021-03-25 RX ORDER — INSULIN LISPRO 100 [IU]/ML
10 INJECTION, SOLUTION INTRAVENOUS; SUBCUTANEOUS
Status: DISCONTINUED | OUTPATIENT
Start: 2021-03-25 | End: 2021-03-27 | Stop reason: HOSPADM

## 2021-03-25 RX ORDER — INSULIN GLARGINE 100 [IU]/ML
48 INJECTION, SOLUTION SUBCUTANEOUS DAILY
Status: DISCONTINUED | OUTPATIENT
Start: 2021-03-25 | End: 2021-03-26

## 2021-03-25 RX ORDER — DEXTROSE 50 % IN WATER (D50W) INTRAVENOUS SYRINGE
12.5-25 AS NEEDED
Status: DISCONTINUED | OUTPATIENT
Start: 2021-03-25 | End: 2021-03-25 | Stop reason: SDUPTHER

## 2021-03-25 RX ORDER — POTASSIUM CHLORIDE 750 MG/1
40 TABLET, FILM COATED, EXTENDED RELEASE ORAL EVERY 4 HOURS
Status: DISCONTINUED | OUTPATIENT
Start: 2021-03-25 | End: 2021-03-25

## 2021-03-25 RX ORDER — INSULIN GLARGINE 100 [IU]/ML
45 INJECTION, SOLUTION SUBCUTANEOUS DAILY
Status: DISCONTINUED | OUTPATIENT
Start: 2021-03-25 | End: 2021-03-25

## 2021-03-25 RX ADMIN — INSULIN LISPRO 4 UNITS: 100 INJECTION, SOLUTION INTRAVENOUS; SUBCUTANEOUS at 17:32

## 2021-03-25 RX ADMIN — Medication 10 ML: at 22:21

## 2021-03-25 RX ADMIN — FAMOTIDINE 20 MG: 20 TABLET, FILM COATED ORAL at 09:18

## 2021-03-25 RX ADMIN — DIBASIC SODIUM PHOSPHATE, MONOBASIC POTASSIUM PHOSPHATE AND MONOBASIC SODIUM PHOSPHATE 2 TABLET: 852; 155; 130 TABLET ORAL at 22:10

## 2021-03-25 RX ADMIN — POTASSIUM CHLORIDE 40 MEQ: 750 TABLET, EXTENDED RELEASE ORAL at 17:31

## 2021-03-25 RX ADMIN — INSULIN GLARGINE 48 UNITS: 100 INJECTION, SOLUTION SUBCUTANEOUS at 09:20

## 2021-03-25 RX ADMIN — INSULIN LISPRO 10 UNITS: 100 INJECTION, SOLUTION INTRAVENOUS; SUBCUTANEOUS at 13:13

## 2021-03-25 RX ADMIN — Medication 10 ML: at 06:00

## 2021-03-25 RX ADMIN — INSULIN LISPRO 10 UNITS: 100 INJECTION, SOLUTION INTRAVENOUS; SUBCUTANEOUS at 17:32

## 2021-03-25 RX ADMIN — POTASSIUM CHLORIDE 40 MEQ: 750 TABLET, EXTENDED RELEASE ORAL at 09:18

## 2021-03-25 RX ADMIN — POTASSIUM & SODIUM PHOSPHATES POWDER PACK 280-160-250 MG 2 PACKET: 280-160-250 PACK at 00:21

## 2021-03-25 RX ADMIN — FAMOTIDINE 20 MG: 20 TABLET, FILM COATED ORAL at 17:31

## 2021-03-25 RX ADMIN — SODIUM CHLORIDE 125 ML/HR: 9 INJECTION, SOLUTION INTRAVENOUS at 22:19

## 2021-03-25 RX ADMIN — SODIUM CHLORIDE 1000 ML: 9 INJECTION, SOLUTION INTRAVENOUS at 08:50

## 2021-03-25 RX ADMIN — DIBASIC SODIUM PHOSPHATE, MONOBASIC POTASSIUM PHOSPHATE AND MONOBASIC SODIUM PHOSPHATE 2 TABLET: 852; 155; 130 TABLET ORAL at 19:50

## 2021-03-25 RX ADMIN — ENOXAPARIN SODIUM 40 MG: 40 INJECTION SUBCUTANEOUS at 13:13

## 2021-03-25 RX ADMIN — POTASSIUM CHLORIDE 40 MEQ: 750 TABLET, EXTENDED RELEASE ORAL at 13:12

## 2021-03-25 RX ADMIN — SODIUM CHLORIDE 125 ML/HR: 9 INJECTION, SOLUTION INTRAVENOUS at 10:35

## 2021-03-25 RX ADMIN — INSULIN LISPRO 4 UNITS: 100 INJECTION, SOLUTION INTRAVENOUS; SUBCUTANEOUS at 09:19

## 2021-03-25 NOTE — PROGRESS NOTES
Primary nurse notified Clayton Romeo NP that the lab called to inform of a critical level of CO2 of 15. Awaiting for orders.

## 2021-03-25 NOTE — PROGRESS NOTES
Transition of Care Plan  RUR 11 %    Disposition   Home with grandmother who provides care and assistance    Transportation   Family/ friend    Medical follow up   New PCP  Appointment 3/31/ 21 at 1 pm    Contact   Mother  Caron Regan  [phone]    CM will follow and assist with any discharge needs. Note--  Prior to admission, patient was living with her grandmother in a private home. Patient was independent and self care. Patient has Medicaid insurance.

## 2021-03-25 NOTE — PROGRESS NOTES
[facility] Adult  Hospitalist Group                                                                                          Hospitalist Progress Note  Brandi Mukherjee MD  Answering service: [phone] from in house phone        Date of Service:  3/24/2021  NAME:  Varun Gonsalez  :  1995  MRN:  [id number]      Admission Summary:   Varun Gonsalez is a 31 y.o. female who presents with abdominal pain    Interval history / Subjective: Follow up hyperglycemia, DKA and new onset DM. Patient seen and examined at the bedside. Labs, images and notes reviewed  Discussed with nursing staff, orders reviewed. Plan discussed with patient/Family  Low nausea but otherwise feeling much better. Energy level slowly improving. Getting close to transition to subcu Lantus, planned this afternoon. No fever or chills. No vomiting. No other concerns. Assessment & Plan:     Acute DKA, gap closing, but still acidotic  New onset diabeteslikely type II  Hypokalemia  Hypophosphatemia  Hypomagnesemia  -Continue insulin GTT, transition to SQ Lantus 40 units daily  -Humalog sliding scale insulin. Hold for mealtime at present  -Further adjustment based on serial Accu-Cheks  -Check BMP every 4 hours  -Replace electrolytes as needed  -Continue D5 IV fluids for now, transition to NS with successful transition from IV to SQ insulin  -Diabetes education team consulted  -Patient will need insulin on discharge. We will check with CM for affordability of Lantus  -CHICO antibody sent, not sure whether collected.   Will collect in a.m. 3/25  -CBC, CMP, magnesium, phosphorus monitoring    Nausea/vomiting, better for vomiting, mild nausea persistent  -Suspect secondary to acute DKA-acidosis part  -As needed antiemetics  -Monitor  -Continue famotidine    Depression - no SI/HI, monitor     Code status: FULL  DVT prophylaxis: Lovenox     Care Plan discussed with: Patient/Family  Anticipated Disposition: Home w/Family  Anticipated Discharge: 24 hours to 48 hours, with stable successful transition to SQ insulin     Hospital Problems  Date Reviewed: 1/8/2015          Codes Class Noted POA    DKA (diabetic ketoacidoses) [facility]) ICD-10-CM: E11.10  ICD-9-CM: 250.12  3/22/2021 Unknown                Review of Systems:   A comprehensive review of systems was negative except for that written in the HPI. Vital Signs:    Last 24hrs VS reviewed since prior progress note. Most recent are:  Visit Vitals  /72 (BP 1 Location: Left upper arm)   Pulse (!) 110   Temp 98.8 °F (37.1 °C)   Resp 20   Ht 6' (1.829 m)   Wt 123.8 kg (272 lb 14.9 oz)   SpO2 98%   BMI 37.02 kg/m²       No intake or output data in the 24 hours ending 03/24/21 2033     Physical Examination:     I had a face to face encounter with this patient and independently examined them on 3/24/2021 as outlined below:          General:          Alert, cooperative, no distress, appears stated age. HEENT:           Atraumatic, anicteric sclerae, pink conjunctivae                          No oral ulcers, mucosa moist, throat clear, dentition fair  Neck:               Supple, symmetrical  Lungs:             Clear to auscultation bilaterally. No Wheezing or Rhonchi. No rales. Chest wall:      No tenderness  No Accessory muscle use. Heart:              Regular  rhythm,  No  murmur   No edema  Abdomen:        Soft, non-tender. Not distended. Bowel sounds normal  Extremities:     No cyanosis. No clubbing,                            Skin turgor normal, Capillary refill normal  Skin:                Not pale. Not Jaundiced  No rashes   Psych:             Not anxious or agitated.   Neurologic:      Alert, moves all extremities, answers questions appropriately and responds to commands          Data Review:    Review and/or order of clinical lab test  Review and/or order of tests in the radiology section of CPT  Review and/or order of tests in the medicine section of CPT    Xr Chest Pa Lat    Result Date: 3/22/2021  1. Lungs are clear. 2. There is suspicion of pneumomediastinum. CT may be helpful for further assessment. [facility]    Result Date: 3/22/2021  Echogenic liver which suggests fatty infiltration      Labs:     Recent Labs     03/24/21  0303 03/23/21  2215   WBC 5.3 5.8   HGB 11.6 12.3   HCT 36.8 39.1    175     Recent Labs     03/24/21  1155 03/24/21  0727 03/24/21  0303 03/22/21  1142 03/22/21  1142    137 137   < > 138   K 3.3* 2.8* 2.7*   < > 4.8   * 111* 112*   < > 109*   CO2 17* 14* 11*   < > 6*   BUN 5* 6 6   < > 9   CREA 0.68 0.66 0.72   < > 1.01   * 183* 238*   < > 402*   CA 8.6 8.8 9.0   < > 8.6   MG 1.6 1.6 1.6   < >  --    PHOS  --   --  1.1*  --  3.0    < > = values in this interval not displayed. Recent Labs     03/22/21  1142   ALT 18      TBILI 0.5   TP 8.8*   ALB 3.7   GLOB 5.1*   LPSE 102     No results for input(s): INR, PTP, APTT, INREXT, INREXT in the last 72 hours. No results for input(s): FE, TIBC, PSAT, FERR in the last 72 hours. No results found for: FOL, RBCF   No results for input(s): PH, PCO2, PO2 in the last 72 hours. No results for input(s): CPK, CKNDX, TROIQ in the last 72 hours.     No lab exists for component: CPKMB  Lab Results   Component Value Date/Time    Cholesterol, total 169 08/06/2020 06:17 AM    HDL Cholesterol 47 08/06/2020 06:17 AM    LDL, calculated 101.4 (H) 08/06/2020 06:17 AM    Triglyceride 103 08/06/2020 06:17 AM    CHOL/HDL Ratio 3.6 08/06/2020 06:17 AM     Lab Results   Component Value Date/Time    Glucose (POC) 221 (H) 03/24/2021 04:06 PM    Glucose (POC) 221 (H) 03/24/2021 02:01 PM    Glucose (POC) 216 (H) 03/24/2021 01:16 PM    Glucose (POC) 181 (H) 03/24/2021 11:34 AM    Glucose (POC) 178 (H) 03/24/2021 10:22 AM     Lab Results   Component Value Date/Time    Color YELLOW/STRAW 03/22/2021 04:20 PM    Appearance CLEAR 03/22/2021 04:20 PM    Specific gravity 1.022 03/22/2021 04:20 PM    pH (UA) 5.0 03/22/2021 04:20 PM    Protein 30 (A) 03/22/2021 04:20 PM    Glucose >1,000 (A) 03/22/2021 04:20 PM    Ketone >80 (A) 03/22/2021 04:20 PM    Bilirubin Negative 03/22/2021 04:20 PM    Urobilinogen 0.2 03/22/2021 04:20 PM    Nitrites Negative 03/22/2021 04:20 PM    Leukocyte Esterase Negative 03/22/2021 04:20 PM    Epithelial cells MODERATE (A) 03/22/2021 04:20 PM    Bacteria Negative 03/22/2021 04:20 PM    WBC 0-4 03/22/2021 04:20 PM    RBC 0-5 03/22/2021 04:20 PM         Medications Reviewed:     Current Facility-Administered Medications   Medication Dose Route Frequency    sodium chloride 0.9 % bolus infusion 1,000 mL  1,000 mL IntraVENous ONCE    dextrose (D50W) injection syrg 12.5-25 g  25-50 mL IntraVENous PRN    insulin glargine (LANTUS) injection 40 Units  40 Units SubCUTAneous DAILY    insulin lispro (HUMALOG)   SubCUTAneous AC&HS    0.9% sodium chloride infusion  125 mL/hr IntraVENous CONTINUOUS    famotidine (PEPCID) tablet 20 mg  20 mg Oral BID    potassium, sodium phosphates (NEUTRA-PHOS) packet 2 Packet  2 Packet Oral Q4H    enoxaparin (LOVENOX) injection 40 mg  40 mg SubCUTAneous Q24H    glucose chewable tablet 16 g  4 Tab Oral PRN    glucagon (GLUCAGEN) injection 1 mg  1 mg IntraMUSCular PRN    sodium chloride (NS) flush 5-40 mL  5-40 mL IntraVENous Q8H    sodium chloride (NS) flush 5-40 mL  5-40 mL IntraVENous PRN    acetaminophen (TYLENOL) tablet 650 mg  650 mg Oral Q6H PRN    Or    acetaminophen (TYLENOL) suppository 650 mg  650 mg Rectal Q6H PRN    polyethylene glycol (MIRALAX) packet 17 g  17 g Oral DAILY PRN    promethazine (PHENERGAN) tablet 12.5 mg  12.5 mg Oral Q6H PRN    Or    ondansetron (ZOFRAN) injection 4 mg  4 mg IntraVENous Q6H PRN     ______________________________________________________________________  EXPECTED LENGTH OF STAY: 2d 21h  ACTUAL LENGTH OF STAY:          2                 Saida Cunningham MD

## 2021-03-25 NOTE — DIABETES MGMT
[address] NURSE SPECIALIST CONSULT     Initial Presentation   Author Congress is a 31 y.o. female who presented to the ED 3/22/21 with a 1 weel complaint of nausea, vomiting and right upper quadrant pain. On evaluation she was found to have hyperglycemia associated with anion gap acidosis and admitted for medical management. HX:   Past Medical History:   Diagnosis Date    ADHD (attention deficit hyperactivity disorder)     Depression     Mood disorder (HCC)     Second hand smoke exposure         DX: DKA    TX: Insulin infusion, IV fluid resuscitation     Hospital course   Clinical progress has been uncomplicated. Diabetes    Patient has a history of gestational diabetes, resolved after birth of her son. Family history positive for diabetes: Dad with Type 2 Diabetes (diagnosed at age 29)   Admission  and A1c 13% indicate poor diabetes control. Consulted by Provider for advanced diabetes nursing assessment and care, specifically related to   [x] Inpatient management strategy  [x] Home management assessment  [x] Survival skill education    Subjective   My head really hurts.     Now off insulin gtt  CO2 15  AG 14  GFR over 60  K 2.9  Required 72 units insulin on gtt yesterday  Off IVF    Fasting Glucose: 237  24 hour glucose range: 209-303  Given 40 units Lantus (45 units Lantus scheduled for today)   7 units correctional in 24 hours    Objective   Physical exam  General Tired, oriented and acutely ill-appearing/tired/weak. Conversant and cooperative. Vital Signs   Visit Vitals  /64   Pulse 87   Temp 98.2 °F (36.8 °C)   Resp 16   Ht 6' (1.829 m)   Wt 124.3 kg (274 lb 1.6 oz)   SpO2 100%   BMI 37.17 kg/m²     Skin  Warm and dry. Acanthosis noted along neckline. Heart   Regular rate and rhythm.  No murmurs, rubs or gallops  Lungs  Clear to auscultation without rales or rhonchi  Extremities No foot wounds        Laboratory  No results found for this visit on 03/22/21 (from the past 12 hour(s)). BMP:   Lab Results   Component Value Date/Time     03/25/2021 12:25 AM    K 2.9 (L) 03/25/2021 12:25 AM     (H) 03/25/2021 12:25 AM    CO2 15 (LL) 03/25/2021 12:25 AM    AGAP 14 03/25/2021 12:25 AM     (H) 03/25/2021 12:25 AM    BUN 4 (L) 03/25/2021 12:25 AM    CREA 0.55 03/25/2021 12:25 AM    GFRAA >60 03/25/2021 12:25 AM    GFRNA >60 03/25/2021 12:25 AM        Blood glucose pattern        Assessment and Plan   Nursing Diagnosis Risk for unstable blood glucose pattern   Nursing Intervention Domain 5255 Decision-making Support   Nursing Interventions Examined current inpatient diabetes control   Explored factors facilitating and impeding inpatient management  Identified self-management practices impeding diabetes control  Explored corrective strategies with patient and responsible inpatient provider   Informed patient of rational for insulin strategy while hospitalized       Evaluation   Emilia Ruano is a 31year old female with a history of gestational diabetes who presented to the ED with a 1 week c/o of lethargy, polyuria, polydipsia and emesis and found to have hyperglycemia with anion gap acidosis consistent with moderate DKA. Initial blood glucose 402, anion gap 23, urine with large ketones, VBG 7.07/26.1/39. She was fluid resuscitated and started on in insulin infusion with Glucostablizer. Transitioned off insulin drip with low-moderate basal insulin, but patient remains hyperglycemic with lingering mild acidosis in the setting of high insulin resistance. Chemistry will be repeated now to assess acidosis. If anion gap is open, would recommend continuing basal insulin with GlucoStabilizer as the multiplier with GlucoStabilizer with blood glucose in the low 200s will likely not generate enough insulin to close her anion gap.   Admitting A1C 13% and likely a diagnosis of Type 2 diabetes given family history, large body habitus and dietary recall. Autoimmune diabetes should also be ruled out given degree of ketosis. Recommendations   1. Adjust basal to 48 units Lantus daily (0.39units/kg). Please give dose now, regardless of if she needs to resume insulin infusion  2. Continue correctional insulin ACHS at resistant sensitivity. Start at glucose of 200  3. Adjust bolus insulin to 8 units Humalog with meals. Hold if patient consumes less than 50% of carbohydrates on meal tray  4. Please send CHICO-65 with next set of labs. 5. Checking chemistry now and again in 4 hours to follow up with lingering acidosis  6. If anion gap greater than 12 may need insulin infusion     Discharge Planning   1. Prescription for glucometer kit (Meter, Lancets (100), Strips (100)). Patient to obtain a blood glucose reading four times daily. First thing in the morning prior to eating and drinking anything then before lunch, dinner and bedtime. Create a log and present to PCP for interpretation. 2. Will need to establish care with a PCP within 1-2 weeks after hospital discharge for ongoing diabetes management     3. Start metformin 500mg BID    4. Lantus PEN: Dose TBD    5. Humalog PEN with meals: Dose TBD    6. Pen Needle, Diabetic 32 Gauge x 1/4\" (1 box)  Billing Code(s)   [x] 03331    Before making these care recommendations, I personally reviewed the hosptialization record, including laboratory and diagnostic data, medications and examined the patient at bedside (circumstances permitting).   Total minutes: [address]  Diabetes Clinical Nurse Specialist  [facility]  Access via [address] [place] [address]

## 2021-03-26 LAB
ANION GAP SERPL CALC-SCNC: 9 MMOL/L (ref 5–15)
ANION GAP SERPL CALC-SCNC: 9 MMOL/L (ref 5–15)
BUN SERPL-MCNC: 4 MG/DL (ref 6–20)
BUN SERPL-MCNC: 4 MG/DL (ref 6–20)
BUN/CREAT SERPL: 7 (ref 12–20)
BUN/CREAT SERPL: 9 (ref 12–20)
CALCIUM SERPL-MCNC: 8 MG/DL (ref 8.5–10.1)
CALCIUM SERPL-MCNC: 8.7 MG/DL (ref 8.5–10.1)
CHLORIDE SERPL-SCNC: 111 MMOL/L (ref 97–108)
CHLORIDE SERPL-SCNC: 114 MMOL/L (ref 97–108)
CO2 SERPL-SCNC: 20 MMOL/L (ref 21–32)
CO2 SERPL-SCNC: 20 MMOL/L (ref 21–32)
CREAT SERPL-MCNC: 0.45 MG/DL (ref 0.55–1.02)
CREAT SERPL-MCNC: 0.6 MG/DL (ref 0.55–1.02)
GAD65 AB SER-ACNC: <5 U/ML (ref 0–5)
GLUCOSE BLD STRIP.AUTO-MCNC: 177 MG/DL (ref 65–100)
GLUCOSE BLD STRIP.AUTO-MCNC: 186 MG/DL (ref 65–100)
GLUCOSE BLD STRIP.AUTO-MCNC: 209 MG/DL (ref 65–100)
GLUCOSE BLD STRIP.AUTO-MCNC: 255 MG/DL (ref 65–100)
GLUCOSE SERPL-MCNC: 153 MG/DL (ref 65–100)
GLUCOSE SERPL-MCNC: 300 MG/DL (ref 65–100)
MAGNESIUM SERPL-MCNC: 1.7 MG/DL (ref 1.6–2.4)
MAGNESIUM SERPL-MCNC: 1.9 MG/DL (ref 1.6–2.4)
PHOSPHATE SERPL-MCNC: 2.2 MG/DL (ref 2.6–4.7)
POTASSIUM SERPL-SCNC: 2.9 MMOL/L (ref 3.5–5.1)
POTASSIUM SERPL-SCNC: 3 MMOL/L (ref 3.5–5.1)
SERVICE CMNT-IMP: ABNORMAL
SODIUM SERPL-SCNC: 140 MMOL/L (ref 136–145)
SODIUM SERPL-SCNC: 143 MMOL/L (ref 136–145)

## 2021-03-26 PROCEDURE — 74011636637 HC RX REV CODE- 636/637: Performed by: INTERNAL MEDICINE

## 2021-03-26 PROCEDURE — 80048 BASIC METABOLIC PNL TOTAL CA: CPT

## 2021-03-26 PROCEDURE — 74011250636 HC RX REV CODE- 250/636: Performed by: INTERNAL MEDICINE

## 2021-03-26 PROCEDURE — 74011250637 HC RX REV CODE- 250/637: Performed by: FAMILY MEDICINE

## 2021-03-26 PROCEDURE — 83735 ASSAY OF MAGNESIUM: CPT

## 2021-03-26 PROCEDURE — 82962 GLUCOSE BLOOD TEST: CPT

## 2021-03-26 PROCEDURE — 65270000029 HC RM PRIVATE

## 2021-03-26 PROCEDURE — 99231 SBSQ HOSP IP/OBS SF/LOW 25: CPT | Performed by: CLINICAL NURSE SPECIALIST

## 2021-03-26 PROCEDURE — 36415 COLL VENOUS BLD VENIPUNCTURE: CPT

## 2021-03-26 PROCEDURE — 74011250637 HC RX REV CODE- 250/637: Performed by: INTERNAL MEDICINE

## 2021-03-26 PROCEDURE — 84100 ASSAY OF PHOSPHORUS: CPT

## 2021-03-26 RX ORDER — MAGNESIUM SULFATE HEPTAHYDRATE 40 MG/ML
2 INJECTION, SOLUTION INTRAVENOUS ONCE
Status: COMPLETED | OUTPATIENT
Start: 2021-03-26 | End: 2021-03-26

## 2021-03-26 RX ORDER — INSULIN GLARGINE 100 [IU]/ML
50 INJECTION, SOLUTION SUBCUTANEOUS DAILY
Status: DISCONTINUED | OUTPATIENT
Start: 2021-03-27 | End: 2021-03-27 | Stop reason: HOSPADM

## 2021-03-26 RX ORDER — POTASSIUM CHLORIDE 750 MG/1
40 TABLET, FILM COATED, EXTENDED RELEASE ORAL
Status: COMPLETED | OUTPATIENT
Start: 2021-03-26 | End: 2021-03-26

## 2021-03-26 RX ORDER — POTASSIUM CHLORIDE 750 MG/1
40 TABLET, FILM COATED, EXTENDED RELEASE ORAL EVERY 4 HOURS
Status: COMPLETED | OUTPATIENT
Start: 2021-03-26 | End: 2021-03-26

## 2021-03-26 RX ADMIN — POTASSIUM CHLORIDE 40 MEQ: 750 TABLET, EXTENDED RELEASE ORAL at 20:41

## 2021-03-26 RX ADMIN — FAMOTIDINE 20 MG: 20 TABLET, FILM COATED ORAL at 09:09

## 2021-03-26 RX ADMIN — ENOXAPARIN SODIUM 40 MG: 40 INJECTION SUBCUTANEOUS at 13:47

## 2021-03-26 RX ADMIN — PROMETHAZINE HYDROCHLORIDE 12.5 MG: 25 TABLET ORAL at 20:41

## 2021-03-26 RX ADMIN — FAMOTIDINE 20 MG: 20 TABLET, FILM COATED ORAL at 17:30

## 2021-03-26 RX ADMIN — Medication 10 ML: at 23:10

## 2021-03-26 RX ADMIN — INSULIN LISPRO 10 UNITS: 100 INJECTION, SOLUTION INTRAVENOUS; SUBCUTANEOUS at 12:14

## 2021-03-26 RX ADMIN — MAGNESIUM SULFATE HEPTAHYDRATE 2 G: 40 INJECTION, SOLUTION INTRAVENOUS at 17:31

## 2021-03-26 RX ADMIN — INSULIN GLARGINE 48 UNITS: 100 INJECTION, SOLUTION SUBCUTANEOUS at 09:09

## 2021-03-26 RX ADMIN — POTASSIUM CHLORIDE 40 MEQ: 750 TABLET, EXTENDED RELEASE ORAL at 09:08

## 2021-03-26 RX ADMIN — INSULIN LISPRO 10 UNITS: 100 INJECTION, SOLUTION INTRAVENOUS; SUBCUTANEOUS at 17:29

## 2021-03-26 RX ADMIN — MUPIROCIN: 20 OINTMENT TOPICAL at 12:14

## 2021-03-26 RX ADMIN — INSULIN LISPRO 10 UNITS: 100 INJECTION, SOLUTION INTRAVENOUS; SUBCUTANEOUS at 09:09

## 2021-03-26 RX ADMIN — DIBASIC SODIUM PHOSPHATE, MONOBASIC POTASSIUM PHOSPHATE AND MONOBASIC SODIUM PHOSPHATE 2 TABLET: 852; 155; 130 TABLET ORAL at 12:16

## 2021-03-26 RX ADMIN — DIBASIC SODIUM PHOSPHATE, MONOBASIC POTASSIUM PHOSPHATE AND MONOBASIC SODIUM PHOSPHATE 2 TABLET: 852; 155; 130 TABLET ORAL at 13:47

## 2021-03-26 RX ADMIN — DIBASIC SODIUM PHOSPHATE, MONOBASIC POTASSIUM PHOSPHATE AND MONOBASIC SODIUM PHOSPHATE 2 TABLET: 852; 155; 130 TABLET ORAL at 20:48

## 2021-03-26 RX ADMIN — INSULIN LISPRO 7 UNITS: 100 INJECTION, SOLUTION INTRAVENOUS; SUBCUTANEOUS at 17:29

## 2021-03-26 RX ADMIN — INSULIN LISPRO 2 UNITS: 100 INJECTION, SOLUTION INTRAVENOUS; SUBCUTANEOUS at 23:09

## 2021-03-26 RX ADMIN — Medication 10 ML: at 06:27

## 2021-03-26 RX ADMIN — POTASSIUM CHLORIDE 40 MEQ: 750 TABLET, EXTENDED RELEASE ORAL at 17:30

## 2021-03-26 RX ADMIN — DIBASIC SODIUM PHOSPHATE, MONOBASIC POTASSIUM PHOSPHATE AND MONOBASIC SODIUM PHOSPHATE 2 TABLET: 852; 155; 130 TABLET ORAL at 17:30

## 2021-03-26 NOTE — DIABETES MGMT
[address] [place] [address] NURSE SPECIALIST CONSULT     Initial Presentation   Geremias Null is a 31 y.o. female who presented to the ED 3/22/21 with a 1 weel complaint of nausea, vomiting and right upper quadrant pain. On evaluation she was found to have hyperglycemia associated with anion gap acidosis and admitted for medical management. HX:   Past Medical History:   Diagnosis Date    ADHD (attention deficit hyperactivity disorder)     Depression     Mood disorder (HCC)     Second hand smoke exposure         DX: DKA    TX: Insulin infusion, IV fluid resuscitation     Hospital course   Clinical progress has been uncomplicated. Diabetes    Patient has a history of gestational diabetes, resolved after birth of her son. Family history positive for diabetes: Dad with Type 2 Diabetes (diagnosed at age 29)   Admission  and A1c 13% indicate poor diabetes control. Consulted by Provider for advanced diabetes nursing assessment and care, specifically related to   [x] Inpatient management strategy  [x] Home management assessment  [x] Survival skill education    Subjective   I'm feeling much better and ready to go home!       Fasting Glucose: 153  24 hour glucose range: 137-212  48 units Lantus  10 units Lispro with meals  0 units correctional in 24 hours    Objective   Physical exam  General Tired, oriented and acutely ill-appearing/tired/weak. Conversant and cooperative. Vital Signs   Visit Vitals  /80 (BP 1 Location: Left upper arm, BP Patient Position: At rest)   Pulse 96   Temp 97.9 °F (36.6 °C)   Resp 19   Ht 6' (1.829 m)   Wt 124.3 kg (274 lb 1.6 oz)   SpO2 98%   BMI 37.17 kg/m²     Skin  Warm and dry. Acanthosis noted along neckline. Heart   Regular rate and rhythm.  No murmurs, rubs or gallops  Lungs  Clear to auscultation without rales or rhonchi  Extremities No foot wounds        Laboratory  No results found for this visit on 03/22/21 (from the past 12 hour(s)). BMP:   Lab Results   Component Value Date/Time     03/26/2021 04:21 AM    K 2.9 (L) 03/26/2021 04:21 AM     (H) 03/26/2021 04:21 AM    CO2 20 (L) 03/26/2021 04:21 AM    AGAP 9 03/26/2021 04:21 AM     (H) 03/26/2021 04:21 AM    BUN 4 (L) 03/26/2021 04:21 AM    CREA 0.45 (L) 03/26/2021 04:21 AM    GFRAA >60 03/26/2021 04:21 AM    GFRNA >60 03/26/2021 04:21 AM        Blood glucose pattern        Assessment and Plan   Nursing Diagnosis Risk for unstable blood glucose pattern   Nursing Intervention Domain 5254 Decision-making Support   Nursing Interventions Examined current inpatient diabetes control   Explored factors facilitating and impeding inpatient management  Identified self-management practices impeding diabetes control  Explored corrective strategies with patient and responsible inpatient provider   Informed patient of rational for insulin strategy while hospitalized       Evaluation   Collette Colt is a 31year old female with a history of gestational diabetes who presented to the ED with a 1 week c/o of lethargy, polyuria, polydipsia and emesis and found to have hyperglycemia with anion gap acidosis consistent with moderate DKA. Initial blood glucose 402, anion gap 23, urine with large ketones, VBG 7.07/26.1/39. She was fluid resuscitated and started on in insulin infusion with Glucostablizer. Transitioned off insulin drip with low-moderate basal insulin, but patient remains hyperglycemic with lingering mild acidosis in the setting of high insulin resistance. Chemistry will be repeated now to assess acidosis. If anion gap is open, would recommend continuing basal insulin with GlucoStabilizer as the multiplier with GlucoStabilizer with blood glucose in the low 200s will likely not generate enough insulin to close her anion gap. Admitting A1C 13% and likely a diagnosis of Type 2 diabetes given family history, large body habitus and dietary recall.   Autoimmune diabetes should also be ruled out given degree of ketosis. Pt currently pending repeat chemistry draw this afternoon to trend potassium levels. Pt able to verbalize when to check blood glucose levels and doses of insulin. No additional questions at this time related to diabetes management. Recommendations   1. Continue:  basal to 48 units Lantus daily (0.39units/kg). 2. Continue correctional insulin ACHS at resistant sensitivity. Start at glucose of 200  3. Continue: 10 units Humalog with meals. Hold if patient consumes less than 50% of carbohydrates on meal tray  4. Please send CHICO-65 with next set of labs. 5. Checking chemistry now and again in 4 hours to follow up with lingering acidosis  6. If anion gap greater than 12 may need insulin infusion     Discharge Planning   1. Prescription for glucometer kit (Meter, Lancets (100), Strips (100)). Patient to obtain a blood glucose reading four times daily. First thing in the morning prior to eating and drinking anything then before lunch, dinner and bedtime. Create a log and present to PCP for interpretation. 2. Will need to establish care with a PCP within 1-2 weeks after hospital discharge for ongoing diabetes management     3. Start metformin 500mg BID    4. Lantus PEN: Dose 48    5. Humalog PEN with meals: Dose 10    6. Pen Needle, Diabetic 32 Gauge x 1/4\" (1 box)  Billing Code(s)   [x] 28560    Before making these care recommendations, I personally reviewed the hosptialization record, including laboratory and diagnostic data, medications and examined the patient at bedside (circumstances permitting).   Total minutes: [address]  Diabetes Clinical Nurse Specialist  [facility]  Access via [employer]

## 2021-03-26 NOTE — PROGRESS NOTES
TRANSFER - IN REPORT:    Verbal report received from [address] [place] [address] (name) on [employer]  being received from [facility] (unit) for routine progression of care      Report consisted of patients Situation, Background, Assessment and   Recommendations(SBAR). Information from the following report(s) SBAR, ED Summary, [facility] and Recent Results was reviewed with the receiving nurse. Opportunity for questions and clarification was provided. Assessment completed upon patients arrival to unit and care assumed.

## 2021-03-26 NOTE — PROGRESS NOTES
[address]  Hospitalist Group                                                                                          Hospitalist Progress Note  Saida Cunningham MD  Answering service: [phone] from in house phone        Date of Service:  3/26/2021  NAME:  Marcelina Mazariegos  :  1995  MRN:  [id number]      Admission Summary:   Marcelina Mazariegos is a 31 y.o. female who presents with abdominal pain    Interval history / Subjective: Follow up hyperglycemia, DKA and new onset DM. Patient seen and examined at the bedside. Labs, images and notes reviewed  Discussed with nursing staff, orders reviewed. Plan discussed with patient/Family  Feeling okay. Denied any nausea or vomiting or abdominal pain. Still significant electrolyte derangements. Severely depleted potassium, magnesium and phosphate storage. Remained low despite significant repletion. Still borderline acidosis with gap closed. No other concerns or questions. Denied any other concerns. Agreeable to stay 1 more night as clinically appropriate and needed. Feeling little puffy with extremities but she suggested not concerned. Yeast with counseling to reassure on that in agreement with that. Assessment & Plan:     Acute DKA, improving  New onset diabeteslikely type II, hyperglycemia getting better controlled  Hypokalemia, still persistent despite significant efforts of replenishment  Hypophosphatemia, slowly improving but still low  Hypomagnesemia, replenishing  -Insulin GTT transition to SQ lantus on 3/24.  -Uptitrate Lantus from 48units to 50 units from tomorrow onwards  -Recheck BMP now and in 4 hrs. -DC IVF 3/26  -Humalog sliding scale insulin. Continue mealtime insulin and also on discharge as well   -Further adjustment based on serial Accu-Cheks  -Continue K, mag, phosphorus replacement  -Diabetes education team on board  -Patient will need insulin on discharge.   Would appreciate CM and DM education team input regarding affordability of Lantus  -CHICO antibody requested. Per nursing team, collected 3/25 AM but not able to see yet  -CBC, CMP, magnesium, phosphorus monitoring    Nausea/vomiting, better for vomiting, better  -Suspect secondary to acute DKA-acidosis part  -As needed antiemetics  -Monitor  -Continue famotidine    Depression - no SI/HI, monitor     Code status: FULL  DVT prophylaxis: Lovenox     Care Plan discussed with: Patient/Family  Anticipated Disposition: Home w/Family  Anticipated Discharge: 24 hours to 48 hours,      Hospital Problems  Date Reviewed: 1/8/2015          Codes Class Noted POA    D[facility]abetic ketoacidoses) ([facility] ICD-10-CM: E11.10  ICD-9-CM: 250.12  3/22/2021 Unknown                Review of Systems:   A comprehensive review of systems was negative except for that written in the HPI. Vital Signs:    Last 24hrs VS reviewed since prior progress note. Most recent are:  Visit Vitals  /80 (BP 1 Location: Left upper arm, BP Patient Position: At rest)   Pulse 96   Temp 97.9 °F (36.6 °C)   Resp 19   Ht 6' (1.829 m)   Wt 124.3 kg (274 lb 1.6 oz)   SpO2 98%   BMI 37.17 kg/m²         Intake/Output Summary (Last 24 hours) at 3/26/2021 1924  Last data filed at 3/26/2021 7999  Gross per 24 hour   Intake 1320 ml   Output 2 ml   Net 1318 ml        Physical Examination:     I had a face to face encounter with this patient and independently examined them on 3/26/2021 as outlined below:          General:          Alert, cooperative, no distress, appears stated age. Obese patient. BMI 37.17. Comfortable and pleasant  HEENT:           Atraumatic, anicteric sclerae, pink conjunctivae                          No oral ulcers, mucosa moist, throat clear, dentition fair  Neck:               Supple, symmetrical  Lungs:              CTA B without any rales rhonchi or wheeze. Chest wall:      No tenderness  No Accessory muscle use.   Heart:               RRR.  Mild puffiness without any significant edema lower extremities. No MRG  Abdomen:         Soft NT, ND except baseline per habitus. Bowel sounds normal  Extremities:     No cyanosis. No clubbing,                            Skin turgor normal, Capillary refill normal  Skin:                Not pale. Not Jaundiced  No rashes   Psych:             Not anxious or agitated. Neurologic:      Alert, moves all extremities, answers questions appropriately and responds to commands            Data Review:    Review and/or order of clinical lab test  Review and/or order of tests in the radiology section of CPT  Review and/or order of tests in the medicine section of CPT    Xr Chest Pa Lat    Result Date: 3/22/2021  1. Lungs are clear. 2. There is suspicion of pneumomediastinum. CT may be helpful for further assessment. [facility]    Result Date: 3/22/2021  Echogenic liver which suggests fatty infiltration      Labs:     Recent Labs     03/24/21  0303 03/23/21  2215   WBC 5.3 5.8   HGB 11.6 12.3   HCT 36.8 39.1    175     Recent Labs     03/26/21  1524 03/26/21  0421 03/25/21  2211 03/25/21  1615    143 142 138   K 3.0* 2.9* 3.2* 3.2*   * 114* 112* 109*   CO2 20* 20* 21 20*   BUN 4* 4* 5* 4*   CREA 0.60 0.45* 0.52* 0.58   * 153* 149* 227*   CA 8.7 8.0* 8.3* 8.9   MG 1.7 1.9 1.8 1.9   PHOS 2.2*  --  1.5* 1.0*     No results for input(s): ALT, AP, TBIL, TBILI, TP, ALB, GLOB, GGT, AML, LPSE in the last 72 hours. No lab exists for component: SGOT, GPT, AMYP, HLPSE  No results for input(s): INR, PTP, APTT, INREXT, INREXT in the last 72 hours. No results for input(s): FE, TIBC, PSAT, FERR in the last 72 hours. No results found for: FOL, RBCF   No results for input(s): PH, PCO2, PO2 in the last 72 hours. No results for input(s): CPK, CKNDX, TROIQ in the last 72 hours.     No lab exists for component: CPKMB  Lab Results   Component Value Date/Time    Cholesterol, total 169 08/06/2020 06:17 AM    HDL Cholesterol 47 08/06/2020 06:17 AM LDL, calculated 101.4 (H) 08/06/2020 06:17 AM    Triglyceride 103 08/06/2020 06:17 AM    CHOL/HDL Ratio 3.6 08/06/2020 06:17 AM     Lab Results   Component Value Date/Time    Glucose (POC) 255 (H) 03/26/2021 04:19 PM    Glucose (POC) 186 (H) 03/26/2021 12:06 PM    Glucose (POC) 177 (H) 03/26/2021 08:56 AM    Glucose (POC) 137 (H) 03/25/2021 09:35 PM    Glucose (POC) 212 (H) 03/25/2021 04:41 PM     Lab Results   Component Value Date/Time    Color YELLOW/STRAW 03/22/2021 04:20 PM    Appearance CLEAR 03/22/2021 04:20 PM    Specific gravity 1.022 03/22/2021 04:20 PM    pH (UA) 5.0 03/22/2021 04:20 PM    Protein 30 (A) 03/22/2021 04:20 PM    Glucose >1,000 (A) 03/22/2021 04:20 PM    Ketone >80 (A) 03/22/2021 04:20 PM    Bilirubin Negative 03/22/2021 04:20 PM    Urobilinogen 0.2 03/22/2021 04:20 PM    Nitrites Negative 03/22/2021 04:20 PM    Leukocyte Esterase Negative 03/22/2021 04:20 PM    Epithelial cells MODERATE (A) 03/22/2021 04:20 PM    Bacteria Negative 03/22/2021 04:20 PM    WBC 0-4 03/22/2021 04:20 PM    RBC 0-5 03/22/2021 04:20 PM         Medications Reviewed:     Current Facility-Administered Medications   Medication Dose Route Frequency    potassium chloride SR (KLOR-CON 10) tablet 40 mEq  40 mEq Oral Q4H    phosphorus (K PHOS NEUTRAL) 250 mg tablet 2 Tab  2 Tab Oral Q4H    [START ON 3/27/2021] insulin glargine (LANTUS) injection 50 Units  50 Units SubCUTAneous DAILY    insulin lispro (HUMALOG) injection   SubCUTAneous AC&HS    insulin lispro (HUMALOG) injection 10 Units  10 Units SubCUTAneous TID WITH MEALS    mupirocin (BACTROBAN) 2 % ointment   Topical DAILY    dextrose (D50W) injection syrg 12.5-25 g  25-50 mL IntraVENous PRN    famotidine (PEPCID) tablet 20 mg  20 mg Oral BID    enoxaparin (LOVENOX) injection 40 mg  40 mg SubCUTAneous Q24H    sodium chloride (NS) flush 5-40 mL  5-40 mL IntraVENous Q8H    sodium chloride (NS) flush 5-40 mL  5-40 mL IntraVENous PRN    acetaminophen (TYLENOL) tablet 650 mg  650 mg Oral Q6H PRN    Or    acetaminophen (TYLENOL) suppository 650 mg  650 mg Rectal Q6H PRN    polyethylene glycol (MIRALAX) packet 17 g  17 g Oral DAILY PRN    promethazine (PHENERGAN) tablet 12.5 mg  12.5 mg Oral Q6H PRN    Or    ondansetron (ZOFRAN) injection 4 mg  4 mg IntraVENous Q6H PRN     ______________________________________________________________________  EXPECTED LENGTH OF STAY: 2d 21h  ACTUAL LENGTH OF STAY:          4                 Jany Rojas MD

## 2021-03-27 VITALS
OXYGEN SATURATION: 98 % | WEIGHT: 274.1 LBS | HEART RATE: 100 BPM | RESPIRATION RATE: 17 BRPM | DIASTOLIC BLOOD PRESSURE: 72 MMHG | BODY MASS INDEX: 37.13 KG/M2 | HEIGHT: 72 IN | TEMPERATURE: 98 F | SYSTOLIC BLOOD PRESSURE: 109 MMHG

## 2021-03-27 LAB
ANION GAP SERPL CALC-SCNC: 6 MMOL/L (ref 5–15)
BUN SERPL-MCNC: 3 MG/DL (ref 6–20)
BUN/CREAT SERPL: 6 (ref 12–20)
CALCIUM SERPL-MCNC: 8.6 MG/DL (ref 8.5–10.1)
CHLORIDE SERPL-SCNC: 111 MMOL/L (ref 97–108)
CO2 SERPL-SCNC: 24 MMOL/L (ref 21–32)
CREAT SERPL-MCNC: 0.54 MG/DL (ref 0.55–1.02)
ERYTHROCYTE [DISTWIDTH] IN BLOOD BY AUTOMATED COUNT: 18.7 % (ref 11.5–14.5)
GLUCOSE BLD STRIP.AUTO-MCNC: 166 MG/DL (ref 65–100)
GLUCOSE BLD STRIP.AUTO-MCNC: 228 MG/DL (ref 65–100)
GLUCOSE SERPL-MCNC: 219 MG/DL (ref 65–100)
HCT VFR BLD AUTO: 33.8 % (ref 35–47)
HGB BLD-MCNC: 11 G/DL (ref 11.5–16)
MAGNESIUM SERPL-MCNC: 2.2 MG/DL (ref 1.6–2.4)
MCH RBC QN AUTO: 23.8 PG (ref 26–34)
MCHC RBC AUTO-ENTMCNC: 32.5 G/DL (ref 30–36.5)
MCV RBC AUTO: 73 FL (ref 80–99)
NRBC # BLD: 0 K/UL (ref 0–0.01)
NRBC BLD-RTO: 0 PER 100 WBC
PLATELET # BLD AUTO: 164 K/UL (ref 150–400)
POTASSIUM SERPL-SCNC: 3.7 MMOL/L (ref 3.5–5.1)
RBC # BLD AUTO: 4.63 M/UL (ref 3.8–5.2)
SERVICE CMNT-IMP: ABNORMAL
SERVICE CMNT-IMP: ABNORMAL
SODIUM SERPL-SCNC: 141 MMOL/L (ref 136–145)
WBC # BLD AUTO: 5.6 K/UL (ref 3.6–11)

## 2021-03-27 PROCEDURE — 36415 COLL VENOUS BLD VENIPUNCTURE: CPT

## 2021-03-27 PROCEDURE — 83735 ASSAY OF MAGNESIUM: CPT

## 2021-03-27 PROCEDURE — 74011250637 HC RX REV CODE- 250/637: Performed by: INTERNAL MEDICINE

## 2021-03-27 PROCEDURE — 82962 GLUCOSE BLOOD TEST: CPT

## 2021-03-27 PROCEDURE — 85027 COMPLETE CBC AUTOMATED: CPT

## 2021-03-27 PROCEDURE — 74011636637 HC RX REV CODE- 636/637: Performed by: INTERNAL MEDICINE

## 2021-03-27 PROCEDURE — 94760 N-INVAS EAR/PLS OXIMETRY 1: CPT

## 2021-03-27 PROCEDURE — 80048 BASIC METABOLIC PNL TOTAL CA: CPT

## 2021-03-27 RX ORDER — INSULIN GLARGINE 100 [IU]/ML
50 INJECTION, SOLUTION SUBCUTANEOUS DAILY
Qty: 5 PEN | Refills: 0 | Status: SHIPPED | OUTPATIENT
Start: 2021-03-27

## 2021-03-27 RX ORDER — INSULIN PUMP SYRINGE, 3 ML
EACH MISCELLANEOUS
Qty: 1 KIT | Refills: 0 | Status: SHIPPED | OUTPATIENT
Start: 2021-03-27

## 2021-03-27 RX ORDER — BLOOD SUGAR DIAGNOSTIC
STRIP MISCELLANEOUS
Qty: 100 PEN NEEDLE | Refills: 0 | Status: SHIPPED | OUTPATIENT
Start: 2021-03-27 | End: 2021-03-27 | Stop reason: SDUPTHER

## 2021-03-27 RX ORDER — LANCETS
EACH MISCELLANEOUS
Qty: 100 EACH | Refills: 11 | Status: SHIPPED | OUTPATIENT
Start: 2021-03-27 | End: 2021-03-27 | Stop reason: SDUPTHER

## 2021-03-27 RX ORDER — INSULIN PUMP SYRINGE, 3 ML
EACH MISCELLANEOUS
Qty: 1 KIT | Refills: 0 | Status: SHIPPED | OUTPATIENT
Start: 2021-03-27 | End: 2021-03-27 | Stop reason: SDUPTHER

## 2021-03-27 RX ORDER — LANCETS
EACH MISCELLANEOUS
Qty: 100 EACH | Refills: 11 | Status: SHIPPED | OUTPATIENT
Start: 2021-03-27

## 2021-03-27 RX ORDER — INSULIN LISPRO 100 [IU]/ML
10 INJECTION, SOLUTION INTRAVENOUS; SUBCUTANEOUS
Qty: 1 PACKAGE | Refills: 0 | Status: SHIPPED | OUTPATIENT
Start: 2021-03-27

## 2021-03-27 RX ORDER — IBUPROFEN 200 MG
CAPSULE ORAL
Qty: 100 STRIP | Refills: 0 | Status: SHIPPED | OUTPATIENT
Start: 2021-03-27 | End: 2021-03-27 | Stop reason: SDUPTHER

## 2021-03-27 RX ORDER — BLOOD SUGAR DIAGNOSTIC
STRIP MISCELLANEOUS
Qty: 100 PEN NEEDLE | Refills: 0 | Status: SHIPPED | OUTPATIENT
Start: 2021-03-27

## 2021-03-27 RX ORDER — MUPIROCIN 20 MG/G
OINTMENT TOPICAL
Qty: 22 G | Refills: 0 | Status: SHIPPED
Start: 2021-03-27

## 2021-03-27 RX ORDER — IBUPROFEN 200 MG
CAPSULE ORAL
Qty: 100 STRIP | Refills: 0 | Status: SHIPPED | OUTPATIENT
Start: 2021-03-27

## 2021-03-27 RX ORDER — METFORMIN HYDROCHLORIDE 500 MG/1
500 TABLET ORAL 2 TIMES DAILY WITH MEALS
Qty: 60 TAB | Refills: 0 | Status: SHIPPED | OUTPATIENT
Start: 2021-03-27

## 2021-03-27 RX ADMIN — Medication 10 ML: at 06:54

## 2021-03-27 RX ADMIN — INSULIN LISPRO 4 UNITS: 100 INJECTION, SOLUTION INTRAVENOUS; SUBCUTANEOUS at 06:54

## 2021-03-27 RX ADMIN — INSULIN LISPRO 10 UNITS: 100 INJECTION, SOLUTION INTRAVENOUS; SUBCUTANEOUS at 12:04

## 2021-03-27 RX ADMIN — FAMOTIDINE 20 MG: 20 TABLET, FILM COATED ORAL at 08:19

## 2021-03-27 RX ADMIN — MUPIROCIN: 20 OINTMENT TOPICAL at 08:20

## 2021-03-27 RX ADMIN — INSULIN LISPRO 10 UNITS: 100 INJECTION, SOLUTION INTRAVENOUS; SUBCUTANEOUS at 08:19

## 2021-03-27 RX ADMIN — INSULIN GLARGINE 50 UNITS: 100 INJECTION, SOLUTION SUBCUTANEOUS at 08:19

## 2021-03-27 NOTE — DISCHARGE SUMMARY
Discharge Summary       PATIENT ID: Diana Worrell  MRN: [de-identified]   YOB: 1995    DATE OF ADMISSION: 3/22/2021 11:18 AM    DATE OF DISCHARGE: 3/27/21   PRIMARY CARE PROVIDER: Tate Del Angel MD     ATTENDING PHYSICIAN: Toni Eller MD  DISCHARGING PROVIDER: Toni Eller MD    To contact this individual call [phone] and ask the  to page. If unavailable ask to be transferred the Adult Hospitalist Department. CONSULTATIONS: None    PROCEDURES/SURGERIES: * No surgery found *    ADMITTING DIAGNOSES & HOSPITAL COURSE:   # Acute DKA, improving  # New onset diabeteslikely type II, hyperglycemia getting better controlled. Pending CHICO Ab test result. On Lantus, Humalog with meals and Metformin. PCP f/up. Please keep blood sugar log with serial monitoring 4 time a day - before meals and bedtime. # Hypokalemia, Hypomagnesemia, Hypophosphetemia. Replenished. Was difficult and needed high doses multiple times. PCP to Follow BMP, Mag, Phos and CBC monitoring on follow up  # Left mid back of thigh boil. Dry now with topical Mupirocin. Continue the same. PCP to follow. # Nausea/vomiting, resolved  # Depression - no SI/HI. Stable. Admission Summary:   Blair Shepard a 25 y.o. female who presents with abdominal pain     Interval history / Subjective: Follow up hyperglycemia, DKA and new onset DM. Patient seen and examined at the bedside. Labs, images and notes reviewed  Discussed with nursing staff, orders reviewed. Plan discussed with patient/Family  Feeling ok. Labs and vitals stable. Electrolytes aggressively replenished and stable. No fever, chills. No dehydration. DM education provided. No other concerns.     DISCHARGE DIAGNOSES / PLAN:      Acute DKA, improving  New onset diabeteslikely type II, hyperglycemia getting better controlled  Hypokalemia, still persistent despite significant efforts of replenishment  Hypophosphatemia, slowly improving but still low  Hypomagnesemia, replenishing  -Insulin GTT transition to SQ lantus on 3/24.  -Uptitrate Lantus to 50U, BGs stable and tolerating well  -BMP, Mg and phos stable  -DC IVF 3/26  -Humalog mealtime 10U TID and SSI. -Further adjustment based on serial Accu-Cheks  -Continue K, mag, phosphorus replacement  -Diabetes education team on board  -Lantus with mealtime Humalog on DC. D/w pt at length. Further adjustment per PCP. -CHICO antibody requested. Per nursing team, collected 3/25 AM but not able to see yet  -CBC, CMP, magnesium, phosphorus monitoring outpt with PCP     Nausea/vomiting, better for vomiting, better  -Suspect secondary to acute DKA-acidosis part  -As needed antiemetics  -Monitor  -Continue famotidine     Depression - no SI/HI, monitor      Code status: FULL  DVT prophylaxis: Lovenox      Care Plan discussed with: Patient/Family  Anticipated Disposition: Home w/Family  Anticipated Discharge: DC home today     ADDITIONAL CARE RECOMMENDATIONS:   Follow up with PCP as noted in appointments. CBC, CMP, Mag, Phos monitoring. Please check L back of the thigh boid. Dry at present without any antibiotics and no need noted at present on discharge. Eye doctors evaluation for acuity and retina evaluation  Other diabetic care instructions as noted above and per PCP. · It is important that you take the medication exactly as they are prescribed. · Keep your medication in the bottles provided by the pharmacist and keep a list of the medication names, dosages, and times to be taken in your wallet. · Do not take other medications without consulting your doctor. · No drinking alcohol or driving car or operating machinery if you are on narcotic pain medications. Donot take sedating mediations if you are sleepy or confused.    · Fall Precautions  · Keep Well Hydrated  · Report to your medical provider if you feel you have  developed allergies to medications  · Follow up with your PCP or Consultant for medication adjustments and refills  · Monitor for signs of fevers,chills,bleeding,chest pain and seek medical attention if you do so.          DIET: Diabetic Diet     ACTIVITY: Activity as tolerated     WOUND CARE: NA. Mupirocin topical application to back of your left thigh boil area. If any enlargement, pain, drainage or redness, please check with your PCP or physician urgently.     EQUIPMENT needed: Glucometer kit     PENDING TEST RESULTS:   At the time of discharge the following test results are still pending: CHICO antibody test for Type 1 DM    FOLLOW UP APPOINTMENTS:    Follow-up Information     Follow up With Specialties Details Why Contact Info    Program for Diabetes Health [facility] Diabetes Schedule an appointment as soon as possible for a visit Call to schedule an appointment with a certified diabetes educator [address] [facility]    Vicky Butler MD Internal Medicine On 3/31/2021 Hospital follow up new PCP Virtual appointment Wednesday, 3/31/21 at 1:00 p.m. Please provide photo ID, insurance card and a listing of all medications. [address] [place] [address]  [phone]                 DISCHARGE MEDICATIONS:  Discharge Medication List as of 3/27/2021  4:01 PM      START taking these medications    Details   insulin glargine (LANTUS,BASAGLAR) 100 unit/mL (3 mL) inpn 50 Units by SubCUTAneous route daily. , Normal, Disp-5 Pen, R-0      mupirocin (BACTROBAN) 2 % ointment APPLY TO left upper leg wound Nursing, document site in comments. Pt has tube from hospital., No Print, Disp-22 g, R-0      insulin lispro (HUMALOG) 100 unit/mL kwikpen 10 Units by SubCUTAneous route Before breakfast, lunch, and dinner., Normal, Disp-1 Package, R-0      metFORMIN (GLUCOPHAGE) 500 mg tablet Take 1 Tab by mouth two (2) times daily (with meals). , Normal, Disp-60 Tab, R-0      Blood-Glucose Meter monitoring kit glucometer kit (Meter, Lancets (100), Strips (100)). Patient to obtain a blood glucose reading four times daily. Can use [employer] [facility] at [facility] if not able to afford at your regular pharmacy, Print, Disp-1 Kit, R-0      lancets misc Patient to obtain a blood glucose reading four times daily. Compatible to your glucometer machine/ Kit, Print, Disp-100 Each, R-11      glucose blood VI test strips (blood glucose test) strip Patient to obtain a blood glucose reading four times daily. Compatible to your glucometer machine/ Kit, Print, Disp-100 Strip, R-0      Insulin Needles, Disposable, 32 gauge x 1/4\" ndle Pen Needle, Diabetic 32 Gauge x 1/4\" (1 box=100 pen needles), Print, Disp-100 Pen Needle, R-0               NOTIFY YOUR PHYSICIAN FOR ANY OF THE FOLLOWING:   Fever over 101 degrees for 24 hours. Chest pain, shortness of breath, fever, chills, nausea, vomiting, diarrhea, change in mentation, falling, weakness, bleeding. Severe pain or pain not relieved by medications. Or, any other signs or symptoms that you may have questions about. DISPOSITION:  x  Home With:   OT  PT  HH  RN       Long term SNF/Inpatient Rehab    Independent/assisted living    Hospice    Other:       PATIENT CONDITION AT DISCHARGE:     Functional status    Poor     Deconditioned    x Independent      Cognition   x  Lucid     Forgetful     Dementia      Catheters/lines (plus indication)    Mendoza     PICC     PEG    x None      Code status    x Full code     DNR      PHYSICAL EXAMINATION AT DISCHARGE:  Visit Vitals  /72 (BP 1 Location: Left upper arm, BP Patient Position: At rest;Supine)   Pulse 100   Temp 98 °F (36.7 °C)   Resp 17   Ht 6' (1.829 m)   Wt 124.3 kg (274 lb 1.6 oz)   SpO2 98%   BMI 37.17 kg/m²       General:          Alert, cooperative, no distress, appears stated age.    Obesity BMI 37.17  HEENT:           Atraumatic, anicteric sclerae, pink conjunctivae                          No oral ulcers, mucosa moist, throat clear, dentition fair  Neck:               Supple, symmetrical  Lungs:             Clear to auscultation bilaterally. No Wheezing or Rhonchi. No rales. Chest wall:      No tenderness  No Accessory muscle use. Heart:              Regular  rhythm,  No  murmur   No edema  Abdomen:        Soft, non-tender. Not distended. Bowel sounds normal  Extremities:     No cyanosis. No clubbing,                            Skin turgor normal, Capillary refill normal  Skin:                Not pale. Not Jaundiced  No rashes   Psych:             Not anxious or agitated. Neurologic:      Alert, moves all extremities, answers questions appropriately and responds to commands       CHRONIC MEDICAL DIAGNOSES:  Problem List as of 3/27/2021 Date Reviewed: 1/8/2015          Codes Class Noted - Resolved    DKA (diabetic ketoacidoses) ([facility] ICD-10-CM: E11.10  ICD-9-CM: 250.12  3/22/2021 - Present        Depression complicating pregnancy, postpartum ICD-10-CM: O99.345, F53.0  ICD-9-CM: 648.44, 311  8/5/2020 - Present        Depressive disorder, not elsewhere classified ICD-10-CM: F32.9  ICD-9-CM: 174  1/8/2015 - Present        Cannabis abuse ICD-10-CM: F12.10  ICD-9-CM: 305.20  1/8/2015 - Present        Unspecified personality disorder ICD-10-CM: F60.9  ICD-9-CM: 301.9  1/8/2015 - Present    Overview Signed 1/8/2015 11:46 AM by Deidre Todd MD     Rule out             RESOLVED: Depression ICD-10-CM: F32.9  ICD-9-CM: 001  1/8/2015 - 1/8/2015            Radiology  Xr Chest Pa Lat    Result Date: 3/22/2021  1. Lungs are clear. 2. There is suspicion of pneumomediastinum. CT may be helpful for further assessment.      [facility]    Result Date: 3/22/2021  Echogenic liver which suggests fatty infiltration      Greater than 40 minutes were spent with the patient on counseling and coordination of care    Signed:   Ju Vargas MD  3/27/2021  6:21 PM

## 2021-03-27 NOTE — PROGRESS NOTES
Bedside and Verbal shift change report given to Juanito Do RN (oncoming nurse) by Ania Cage RN (offgoing nurse). Report included the following information SBAR, ED Summary, MAR and Recent Results.

## 2021-03-27 NOTE — DISCHARGE INSTRUCTIONS
Diabetes Management:  1. Take a blood glucose reading four times daily:  First thing in the morning before you eat or drink anything. Then before lunch, dinner and bedtime. Make a log and bring to your next doctor appointment. If your blood sugar is over 200 consistently OR   If your blood sugar is under 100 consistently: call your doctor for a medication adjustment    2. Goal A1C is 7%. 3. Make a follow up appointment with your  primary care physician. Please see them within the next 1-2 weeks. 4. Make sure to get a DILATED eye exam every year. This checks for retinal blood vessel damage caused by long term high blood sugar. 5. Make sure to examine your feet every day looking for any wound or foot irritation as sensation can be impaired in your feet. Always wear socks and/or slippers even while at home. This will protect your feet from injury. 6. Diabetes Self Management Training:  Outpatient appointments are available with a certified diabetic educator who can  you with meal planning, insulin administration and other diabetes management strategies. Please call [phone] to schedule at a location close to your home. Discharge Instructions       PATIENT ID: Josue Venegas  MRN: [de-identified]   YOB: 1995    DATE OF ADMISSION: 3/22/2021 11:18 AM    DATE OF DISCHARGE: 3/27/2021    PRIMARY CARE PROVIDER: Charlotte Mills MD     ATTENDING PHYSICIAN: Tony Victor MD  DISCHARGING PROVIDER: Kindra Howard MD    To contact this individual call [phone] and ask the  to page. If unavailable ask to be transferred the Adult Hospitalist Department. DISCHARGE DIAGNOSES   Acute DKA, improving  # New onset diabetes-likely type II, hyperglycemia getting better controlled. Pending [facility]ICO Ab test result. On Lantus, Humalog with meals and Metformin. PCP f/up. Please keep blood sugar log with serial monitoring 4 time a day - before meals and bedtime.   # Hypokalemia, Hypomagnesemia, Hypophosphetemia. Replenished. Was difficult and needed high doses multiple times. PCP to Follow BMP, Mag, Phos and CBC monitoring on follow up  # Left mid back of thigh boil. Dry now with topical Mupirocin. Continue the same. PCP to follow. # Nausea/vomiting, resolved  # Depression - no SI/HI. Stable. CONSULTATIONS: None    PROCEDURES/SURGERIES: * No surgery found *    PENDING TEST RESULTS:   At the time of discharge the following test results are still pending: CHICO antibody test for Type 1 DM    FOLLOW UP APPOINTMENTS:   Follow-up Information     Follow up With Specialties Details Why Contact Info    Program for Diabetes Health RDE Diabetes Schedule an appointment as soon as possible for a visit Call to schedule an appointment with a certified diabetes educator [address]    Shahrzad Delaney MD Internal Medicine On 3/31/2021 Hospital follow up new PCP Virtual appointment Wednesday, 3/31/21 at 1:00 p.m. Please provide photo ID, insurance card and a listing of all medications. [address] [place] [address]  [phone]             ADDITIONAL CARE RECOMMENDATIONS:   Follow up with PCP as noted in appointments. CBC, CMP, Mag, Phos monitoring. Please check L back of the thigh boid. Dry at present without any antibiotics and no need noted at present on discharge. Eye doctors evaluation for acuity and retina evaluation  Other diabetic care instructions as noted above and per PCP. · It is important that you take the medication exactly as they are prescribed. · Keep your medication in the bottles provided by the pharmacist and keep a list of the medication names, dosages, and times to be taken in your wallet. · Do not take other medications without consulting your doctor. · No drinking alcohol or driving car or operating machinery if you are on narcotic pain medications.  Donot take sedating mediations if you are sleepy or confused. · Fall Precautions  · Keep Well Hydrated  · Report to your medical provider if you feel you have  developed allergies to medications  · Follow up with your PCP or Consultant for medication adjustments and refills  · Monitor for signs of fevers,chills,bleeding,chest pain and seek medical attention if you do so. DIET: Diabetic Diet    ACTIVITY: Activity as tolerated    WOUND CARE: NA. Mupirocin topical application to back of your left thigh boil area. If any enlargement, pain, drainage or redness, please check with your PCP or physician urgently. EQUIPMENT needed: Glucometer kit      Radiology:  Xr Chest Pa Lat    Result Date: 3/22/2021  1. Lungs are clear. 2. There is suspicion of pneumomediastinum. CT may be helpful for further assessment. [address] [facility]    Result Date: 3/22/2021  Echogenic liver which suggests fatty infiltration          DISCHARGE MEDICATIONS:   See Medication Reconciliation Form    · It is important that you take the medication exactly as they are prescribed. · Keep your medication in the bottles provided by the pharmacist and keep a list of the medication names, dosages, and times to be taken in your wallet. · Do not take other medications without consulting your doctor. NOTIFY YOUR PHYSICIAN FOR ANY OF THE FOLLOWING:   Fever over 101 degrees for 24 hours. Chest pain, shortness of breath, fever, chills, nausea, vomiting, diarrhea, change in mentation, falling, weakness, bleeding. Severe pain or pain not relieved by medications. Or, any other signs or symptoms that you may have questions about.       DISPOSITION:  x  Home With:   OT  PT  HH  RN       SNF/Inpatient Rehab/LTAC    Independent/assisted living    Hospice    Other:     CDMP Checked:   Yes x     PROBLEM LIST Updated:  Yes x        My Medications      START taking these medications      Instructions Each Dose to Equal Morning Noon Evening Bedtime   blood glucose test strip  Generic drug: glucose blood VI test strips    Your last dose was: Your next dose is:         Patient to obtain a blood glucose reading four times daily. Compatible to your glucometer machine/ Kit                  Blood-Glucose Meter monitoring kit    Your last dose was: Your next dose is:         glucometer kit (Meter, Lancets (100), Strips (100)). Patient to obtain a blood glucose reading four times daily. Can use [employer] at [facility] if not able to afford at your regular pharmacy                  insulin glargine 100 unit/mL (3 mL) Inpn  Commonly known as: LANTUSBASAGLAR    Your last dose was: Your next dose is:         50 Units by SubCUTAneous route daily. 50 Units                 insulin lispro 100 unit/mL kwikpen  Commonly known as: HUMALOG    Your last dose was: Your next dose is:         10 Units by SubCUTAneous route Before breakfast, lunch, and dinner. 10 Units                 Insulin Needles (Disposable) 32 gauge x 1/4\" Ndle    Your last dose was: Your next dose is:         Pen Needle, Diabetic 32 Gauge x 1/4\" (1 box=100 pen needles)                  lancets Misc    Your last dose was: Your next dose is:         Patient to obtain a blood glucose reading four times daily. Compatible to your glucometer machine/ Kit                  metFORMIN 500 mg tablet  Commonly known as: GLUCOPHAGE    Your last dose was: Your next dose is: Take 1 Tab by mouth two (2) times daily (with meals). 500 mg                 mupirocin 2 % ointment  Commonly known as: BACTROBAN    Your last dose was: Your next dose is:         APPLY TO left upper leg wound Nursing, document site in comments. Pt has tube from hospital.                     STOP taking these medications    buPROPion  mg tablet  Commonly known as:  Wellbutrin XL        ondansetron 4 mg disintegrating tablet  Commonly known as: Zofran ODT              Where to Get Your Medications      These medications were sent to [facility]/pharmacy #[id number] - [place], [place] - [address] [facility]  [address] [facility] [address]    Phone: [phone]   · insulin glargine 100 unit/mL (3 mL) Inpn  · insulin lispro 100 unit/mL kwikpen  · metFORMIN 500 mg tablet     Information on where to get these meds will be given to you by the nurse or doctor.    Ask your nurse or doctor about these medications  · blood glucose test strip  · Blood-Glucose Meter monitoring kit  · Insulin Needles (Disposable) 32 gauge x 1/4\" Ndle  · lancets Misc  · mupirocin 2 % ointment         Signed:   Juan Harris MD  3/27/2021  3:36 PM

## 2021-03-27 NOTE — PROGRESS NOTES
If you experience chest pain call 911. Do not drive yourself. I have reviewed discharge instructions with the patient. The patient verbalized understanding.     Edilia Mares RN

## 2021-03-28 LAB
BACTERIA SPEC CULT: ABNORMAL
GRAM STN SPEC: ABNORMAL
SERVICE CMNT-IMP: ABNORMAL

## 2021-03-29 ENCOUNTER — TELEPHONE (OUTPATIENT)
Dept: CASE MANAGEMENT | Age: 26
End: 2021-03-29

## 2021-03-29 NOTE — TELEPHONE ENCOUNTER
3/29/21 11:19 AM     Patient contacted regarding recent discharge and COVID-19 risk. Discussed COVID-19 related testing which was available at this time. Test results were negative. Patient informed of results, if available? yes    Outreach made within 2 business days of discharge: Yes    Care Transition Nurse/ Ambulatory Care Manager/ LPN Care Coordinator contacted the patient by telephone to perform post discharge assessment. Verified name and  with patient as identifiers. Patient has following risk factors of: diabetes. CTN/ACM/LPN reviewed discharge instructions, medical action plan and red flags related to discharge diagnosis. Reviewed and educated them on any new and changed medications related to discharge diagnosis. Pt states that she has obtained all D/C medications and supplies and was not taking the 2 D/C'd medications prior to her hospital stay. She confirms VV with PCP on 3/31 and I advised that this says it will be through [address] which pt currently does not have. Offered to help her activate today but she prefers to wait on this. Have suggested she call PCP office prior to Wednesday if she needs to make this a telephone visit and she is agreeable to this plan. Advance Care Planning:   Does patient have an Advance Directive: currently not on file; education provided        Primary Decision Maker: Tiara Reinoso - Mother - [phone]    Education not provided regarding infection prevention, and signs and symptoms of COVID-19 and when to seek medical attention because patient, who was given an opportunity for questions and concerns, denies having any at this time. CTN/ACM/LPN offered to provide COVID-19 contact information for future reference, but pt declines at this time. From CDC: Are you at higher risk for severe illness?  Wash your hands often.  Avoid close contact (6 feet, which is about two arm lengths) with people who are sick.    Put distance between yourself and other people if COVID-19 is spreading in your community.  Clean and disinfect frequently touched surfaces.  Avoid all cruise travel and non-essential air travel.  Call your healthcare professional if you have concerns about COVID-19 and your underlying condition or if you are sick. For more information on steps you can take to protect yourself, see CDC's How to Protect Yourself      Patient/family/caregiver given information for [employer] Loop and agrees to enroll no, declined  Patient's preferred e-mail:  N/A  Patient's preferred phone number: N/A  Based on Loop alert triggers, patient will be contacted by nurse care manager for worsening symptoms. Plan for follow-up call in 14 days based on severity of symptoms and risk factors.

## 2021-03-31 ENCOUNTER — VIRTUAL VISIT (OUTPATIENT)
Dept: INTERNAL MEDICINE CLINIC | Age: 26
End: 2021-03-31

## 2021-04-12 ENCOUNTER — TELEPHONE (OUTPATIENT)
Dept: CASE MANAGEMENT | Age: 26
End: 2021-04-12

## 2021-04-12 NOTE — TELEPHONE ENCOUNTER
4/12/21 6:18 PM     Patient resolved from [address] episode on 4/12/21. Discussed COVID-19 related testing which was available at this time. Test results were negative. Patient informed of results, if available? yes     Patient/family has been provided the following resources and education related to COVID-19:                         Signs, symptoms and red flags related to COVID-19            CDC exposure and quarantine guidelines            [facility] exposure contact - [phone]            Contact for their local Department of Health                 Patient currently reports that the following symptoms have improved:  pt reports she is doing well now. I thanked her for the update, advised this is my final call to her, wished her the best, and we disconnected. No further outreach scheduled with this CTN/ACM/LPN/HC/ MA. Episode of Care resolved. Patient has this CTN/ACM/LPN/HC/MA contact information if future needs arise.

## 2021-04-26 ENCOUNTER — VIRTUAL VISIT (OUTPATIENT)
Dept: INTERNAL MEDICINE CLINIC | Age: 26
End: 2021-04-26

## 2021-04-26 NOTE — PROGRESS NOTES
Taj Garrett is a 31 y.o. female    Chief Complaint   Patient presents with    Follow-up     1. Have you been to the ER, urgent care clinic since your last visit? Hospitalized since your last visit? No      2. Have you seen or consulted any other health care providers outside of the [address] [place] [address] since your last visit? Include any pap smears or colon screening.   No

## 2021-04-29 NOTE — PROGRESS NOTES
Edson Galvan is a 31 y.o. female who was seen by synchronous (real-time) audio-video technology on 4/26/2021 for Follow-up        Assessment & Plan:   {A/P PLUS DISPO [facility]:74182}    {time statement optional (Optional):52137}    Subjective:       Prior to Admission medications    Medication Sig Start Date End Date Taking? Authorizing Provider   insulin glargine (LANTUS,BASAGLAR) 100 unit/mL (3 mL) inpn 50 Units by SubCUTAneous route daily. 3/27/21  Yes Yosvany Bolivar MD   insulin lispro (HUMALOG) 100 unit/mL kwikpen 10 Units by SubCUTAneous route Before breakfast, lunch, and dinner. 3/27/21  Yes Yosvany Bolivar MD   metFORMIN (GLUCOPHAGE) 500 mg tablet Take 1 Tab by mouth two (2) times daily (with meals). 3/27/21  Yes Yosvany Bolivar MD   lancets misc Patient to obtain a blood glucose reading four times daily. Compatible to your glucometer machine/ Kit 3/27/21  Yes Yosvany Bolivar MD   glucose blood VI test strips (blood glucose test) strip Patient to obtain a blood glucose reading four times daily. Compatible to your glucometer machine/ Kit 3/27/21  Yes Yosvany Bolivar MD   Insulin Needles, Disposable, 32 gauge x 1/4\" ndle Pen Needle, Diabetic 32 Gauge x 1/4\" (1 box=100 pen needles) 3/27/21  Yes Yosvany Bolivar MD   Blood-Glucose Meter monitoring kit glucometer kit (Meter, Lancets (100), Strips (100)). Patient to obtain a blood glucose reading four times daily. Can use [employer] at [facility] if not able to afford at your regular pharmacy 3/27/21  Yes Yosvany Bolivar MD   mupirocin (BACTROBAN) 2 % ointment APPLY TO left upper leg wound Nursing, document site in comments. Pt has tube from hospital. 3/27/21   Yosvany Bolivar MD     {History SmartLink choices - disappears if left unselected (Optional):28456}    ROS    Objective:   No flowsheet data found.      [INSTRUCTIONS:  \"[x]\" Indicates a positive item  \"[]\" Indicates a negative item  -- DELETE ALL ITEMS NOT EXAMINED]    Constitutional: [x] Appears well-developed and well-nourished [x] No apparent distress      [] Abnormal -     Mental status: [x] Alert and awake  [x] Oriented to person/place/time [x] Able to follow commands    [] Abnormal -     Eyes:   EOM    [x]  Normal    [] Abnormal -   Sclera  [x]  Normal    [] Abnormal -          Discharge [x]  None visible   [] Abnormal -     HENT: [x] Normocephalic, atraumatic  [] Abnormal -   [x] Mouth/Throat: Mucous membranes are moist    External Ears [x] Normal  [] Abnormal -    Neck: [x] No visualized mass [] Abnormal -     Pulmonary/Chest: [x] Respiratory effort normal   [x] No visualized signs of difficulty breathing or respiratory distress        [] Abnormal -      Musculoskeletal:   [x] Normal gait with no signs of ataxia         [x] Normal range of motion of neck        [] Abnormal -     Neurological:        [x] No Facial Asymmetry (Cranial nerve 7 motor function) (limited exam due to video visit)          [x] No gaze palsy        [] Abnormal -          Skin:        [x] No significant exanthematous lesions or discoloration noted on facial skin         [] Abnormal -            Psychiatric:       [x] Normal Affect [] Abnormal -        [x] No Hallucinations    Other pertinent observable physical exam findings:-        We discussed the expected course, resolution and complications of the diagnosis(es) in detail. Medication risks, benefits, costs, interactions, and alternatives were discussed as indicated. I advised her to contact the office if her condition worsens, changes or fails to improve as anticipated. She expressed understanding with the diagnosis(es) and plan. Lorna Speaks, was evaluated through a synchronous (real-time) audio-video encounter. The patient (or guardian if applicable) is aware that this is a billable service. Verbal consent to proceed has been obtained within the past 12 months.  The visit was conducted pursuant to the emergency declaration under the [address] Emergencies Act, [facility] waiver authority and the Coronavirus Preparedness and Response Supplemental Appropriations Act. Patient identification was verified, and a caregiver was present when appropriate. The patient was located in a state where the provider was credentialed to provide care.       Jamie Thomas MD

## 2022-01-26 ENCOUNTER — NURSE TRIAGE (OUTPATIENT)
Dept: OTHER | Facility: CLINIC | Age: 27
End: 2022-01-26

## 2022-01-26 ENCOUNTER — APPOINTMENT (OUTPATIENT)
Dept: CT IMAGING | Age: 27
End: 2022-01-26
Attending: FAMILY MEDICINE
Payer: MEDICAID

## 2022-01-26 ENCOUNTER — HOSPITAL ENCOUNTER (EMERGENCY)
Age: 27
Discharge: HOME | End: 2022-01-26
Attending: STUDENT IN AN ORGANIZED HEALTH CARE EDUCATION/TRAINING PROGRAM
Payer: MEDICAID

## 2022-01-26 VITALS
TEMPERATURE: 97.6 F | HEART RATE: 74 BPM | SYSTOLIC BLOOD PRESSURE: 132 MMHG | RESPIRATION RATE: 16 BRPM | DIASTOLIC BLOOD PRESSURE: 82 MMHG | OXYGEN SATURATION: 100 %

## 2022-01-26 DIAGNOSIS — R73.9 HYPERGLYCEMIA: Primary | ICD-10-CM

## 2022-01-26 DIAGNOSIS — E11.9 TYPE 2 DIABETES MELLITUS WITHOUT COMPLICATION, WITHOUT LONG-TERM CURRENT USE OF INSULIN (HCC): ICD-10-CM

## 2022-01-26 LAB
ALBUMIN SERPL-MCNC: 3.5 G/DL (ref 3.5–5)
ALBUMIN/GLOB SERPL: 0.8 {RATIO} (ref 1.1–2.2)
ALP SERPL-CCNC: 95 U/L (ref 45–117)
ALT SERPL-CCNC: 18 U/L (ref 12–78)
ANION GAP SERPL CALC-SCNC: 5 MMOL/L (ref 5–15)
APPEARANCE UR: CLEAR
AST SERPL-CCNC: 8 U/L (ref 15–37)
BACTERIA URNS QL MICRO: ABNORMAL /HPF
BASOPHILS # BLD: 0 K/UL (ref 0–0.1)
BASOPHILS NFR BLD: 1 % (ref 0–1)
BILIRUB SERPL-MCNC: 0.5 MG/DL (ref 0.2–1)
BILIRUB UR QL: NEGATIVE
BUN SERPL-MCNC: 8 MG/DL (ref 6–20)
BUN/CREAT SERPL: 9 (ref 12–20)
CALCIUM SERPL-MCNC: 9.5 MG/DL (ref 8.5–10.1)
CHLORIDE SERPL-SCNC: 102 MMOL/L (ref 97–108)
CO2 SERPL-SCNC: 26 MMOL/L (ref 21–32)
COLOR UR: ABNORMAL
COMMENT, HOLDF: NORMAL
CREAT SERPL-MCNC: 0.9 MG/DL (ref 0.55–1.02)
DIFFERENTIAL METHOD BLD: ABNORMAL
EOSINOPHIL # BLD: 0.2 K/UL (ref 0–0.4)
EOSINOPHIL NFR BLD: 2 % (ref 0–7)
EPITH CASTS URNS QL MICRO: ABNORMAL /LPF
ERYTHROCYTE [DISTWIDTH] IN BLOOD BY AUTOMATED COUNT: 17.4 % (ref 11.5–14.5)
GLOBULIN SER CALC-MCNC: 4.5 G/DL (ref 2–4)
GLUCOSE BLD STRIP.AUTO-MCNC: 284 MG/DL (ref 65–117)
GLUCOSE SERPL-MCNC: 367 MG/DL (ref 65–100)
GLUCOSE UR STRIP.AUTO-MCNC: >1000 MG/DL
HCT VFR BLD AUTO: 40 % (ref 35–47)
HGB BLD-MCNC: 12.6 G/DL (ref 11.5–16)
HGB UR QL STRIP: NEGATIVE
HYALINE CASTS URNS QL MICRO: ABNORMAL /LPF (ref 0–5)
IMM GRANULOCYTES # BLD AUTO: 0 K/UL (ref 0–0.04)
IMM GRANULOCYTES NFR BLD AUTO: 0 % (ref 0–0.5)
KETONES UR QL STRIP.AUTO: 15 MG/DL
LEUKOCYTE ESTERASE UR QL STRIP.AUTO: NEGATIVE
LYMPHOCYTES # BLD: 2.4 K/UL (ref 0.8–3.5)
LYMPHOCYTES NFR BLD: 29 % (ref 12–49)
MCH RBC QN AUTO: 24.4 PG (ref 26–34)
MCHC RBC AUTO-ENTMCNC: 31.5 G/DL (ref 30–36.5)
MCV RBC AUTO: 77.4 FL (ref 80–99)
MONOCYTES # BLD: 0.7 K/UL (ref 0–1)
MONOCYTES NFR BLD: 9 % (ref 5–13)
NEUTS SEG # BLD: 4.9 K/UL (ref 1.8–8)
NEUTS SEG NFR BLD: 59 % (ref 32–75)
NITRITE UR QL STRIP.AUTO: NEGATIVE
NRBC # BLD: 0 K/UL (ref 0–0.01)
NRBC BLD-RTO: 0 PER 100 WBC
PH UR STRIP: 5.5 [PH] (ref 5–8)
PLATELET # BLD AUTO: 243 K/UL (ref 150–400)
PMV BLD AUTO: 12.2 FL (ref 8.9–12.9)
POTASSIUM SERPL-SCNC: 3.6 MMOL/L (ref 3.5–5.1)
PROT SERPL-MCNC: 8 G/DL (ref 6.4–8.2)
PROT UR STRIP-MCNC: NEGATIVE MG/DL
RBC # BLD AUTO: 5.17 M/UL (ref 3.8–5.2)
RBC #/AREA URNS HPF: ABNORMAL /HPF (ref 0–5)
SAMPLES BEING HELD,HOLD: NORMAL
SERVICE CMNT-IMP: ABNORMAL
SODIUM SERPL-SCNC: 133 MMOL/L (ref 136–145)
SP GR UR REFRACTOMETRY: 1.01 (ref 1–1.03)
UR CULT HOLD, URHOLD: NORMAL
UROBILINOGEN UR QL STRIP.AUTO: 0.2 EU/DL (ref 0.2–1)
WBC # BLD AUTO: 8.2 K/UL (ref 3.6–11)
WBC URNS QL MICRO: ABNORMAL /HPF (ref 0–4)

## 2022-01-26 PROCEDURE — 82962 GLUCOSE BLOOD TEST: CPT

## 2022-01-26 PROCEDURE — 36415 COLL VENOUS BLD VENIPUNCTURE: CPT

## 2022-01-26 PROCEDURE — 85025 COMPLETE CBC W/AUTO DIFF WBC: CPT

## 2022-01-26 PROCEDURE — 81001 URINALYSIS AUTO W/SCOPE: CPT

## 2022-01-26 PROCEDURE — 99284 EMERGENCY DEPT VISIT MOD MDM: CPT

## 2022-01-26 PROCEDURE — 96360 HYDRATION IV INFUSION INIT: CPT

## 2022-01-26 PROCEDURE — 74011636637 HC RX REV CODE- 636/637: Performed by: FAMILY MEDICINE

## 2022-01-26 PROCEDURE — 70450 CT HEAD/BRAIN W/O DYE: CPT

## 2022-01-26 PROCEDURE — 74011250636 HC RX REV CODE- 250/636: Performed by: FAMILY MEDICINE

## 2022-01-26 PROCEDURE — 80053 COMPREHEN METABOLIC PANEL: CPT

## 2022-01-26 RX ADMIN — Medication 5 UNITS: at 17:13

## 2022-01-26 RX ADMIN — SODIUM CHLORIDE 1000 ML: 9 INJECTION, SOLUTION INTRAVENOUS at 14:52

## 2022-01-26 NOTE — DISCHARGE INSTRUCTIONS
Thank you for allowing us to provide you with medical care today. We realize that you have many choices for your emergency care needs. We thank you for choosing [facility]. Please choose us in the future for any continued health care needs. The exam and treatment you received in the emergency department were for an emergent problem and are not intended as complete care. It is fortunate that you follow-up with a doctor. If your symptoms worsen or you do not improve should return to the emergency department. We are available 24 hours a day. Please make an appointment with your health care provider for follow-up of your emergency department visit. Take this sheet with you when you go to your follow-up visit.

## 2022-01-26 NOTE — ED TRIAGE NOTES
Pt stated she has not been feeling well for 2 weeks, c/o left posterior arm numbness x 2 weeks, blood sugars  have been runny in the 300's, denies fever, +nausea, her vision has been going out , loss of complete vision for a couple of seconds, +fatigue

## 2022-01-26 NOTE — ED PROVIDER NOTES
Is a 69-year-old female with past medical history of type 2 diabetes who presents for evaluation of symptoms of high blood sugar. She reports that for the last few weeks, she has had episodes of loss of vision. Vision seems to go out of focus and returns in just a few seconds. She additionally has noticed some #1 tingling in her arms and hands as well as her left foot. She has been having some headaches, polyuria, and fatigue. She was previously on insulin, but felt it was lowering her blood sugar too much and took herself off of it. She was then placed on Metformin. She does not have a provider that she currently sees for her blood sugar, but does have an appointment with a new PCP in the next few weeks. She reports that she is currently [facility], but does not feel like it is helping. Her blood sugars have been running in the mid to high 300s consistently. Past Medical History:   Diagnosis Date    ADHD (attention deficit hyperactivity disorder)     Depression     Mood disorder (HCC)     Second hand smoke exposure        Past Surgical History:   Procedure Laterality Date    HX GYN           No family history on file.     Social History     Socioeconomic History    Marital status: SINGLE     Spouse name: Not on file    Number of children: Not on file    Years of education: Not on file    Highest education level: Not on file   Occupational History    Not on file   Tobacco Use    Smoking status: Current Every Day Smoker     Packs/day: 0.25    Smokeless tobacco: Never Used   Substance and Sexual Activity    Alcohol use: Not Currently     Comment: socially    Drug use: Yes     Types: Marijuana    Sexual activity: Yes   Other Topics Concern     Service Not Asked    Blood Transfusions Not Asked    Caffeine Concern Not Asked    Occupational Exposure Not Asked    Hobby Hazards Not Asked    Sleep Concern Not Asked    Stress Concern Not Asked    Weight Concern Not Asked    Special Diet Not Asked    Back Care Not Asked    Exercise Not Asked    Bike Helmet Not Asked   [address] Not Asked    Self-Exams Not Asked   Social History Narrative    Not on file     Social Determinants of Health     Financial Resource Strain:     Difficulty of Paying Living Expenses: Not on file   Food Insecurity:     Worried About Running Out of Food in the Last Year: Not on file    Elizabeth of Food in the Last Year: Not on file   Transportation Needs:     Lack of Transportation (Medical): Not on file    Lack of Transportation (Non-Medical): Not on file   Physical Activity:     Days of Exercise per Week: Not on file    Minutes of Exercise per Session: Not on file   Stress:     Feeling of Stress : Not on file   Social Connections:     Frequency of Communication with Friends and Family: Not on file    Frequency of Social Gatherings with Friends and Family: Not on file    Attends Zoroastrianism Services: Not on file    Active Member of [address] [place] [address] or Organizations: Not on file    Attends Club or Organization Meetings: Not on file    Marital Status: Not on file   Intimate Partner Violence:     Fear of Current or Ex-Partner: Not on file    Emotionally Abused: Not on file    Physically Abused: Not on file    Sexually Abused: Not on file   Housing Stability:     Unable to Pay for Housing in the Last Year: Not on file    Number of [place] in the Last Year: Not on file    Unstable Housing in the Last Year: Not on file         ALLERGIES: Patient has no known allergies. Review of Systems   Constitutional: Negative for unexpected weight change. HENT: Negative for congestion. Eyes: Negative for pain. Respiratory: Negative for cough, chest tightness and shortness of breath. Cardiovascular: Negative for chest pain and palpitations. Gastrointestinal: Negative for abdominal pain. Endocrine: Negative for cold intolerance.    Genitourinary: Negative for dysuria and flank pain.   Musculoskeletal: Negative for arthralgias. Skin: Negative for color change. Allergic/Immunologic: Negative for immunocompromised state. Neurological: Positive for headaches. Negative for dizziness, facial asymmetry and weakness. Hematological: Negative for adenopathy. Psychiatric/Behavioral: Negative for agitation. Vitals:    01/26/22 1353   BP: 121/73   Pulse: 83   Resp: 16   Temp: 97 °F (36.1 °C)   SpO2: 100%            Physical Exam  Vitals and nursing note reviewed. Constitutional:       General: She is not in acute distress. Appearance: Normal appearance. She is normal weight. Eyes:      Extraocular Movements: Extraocular movements intact. Conjunctiva/sclera: Conjunctivae normal.      Pupils: Pupils are equal, round, and reactive to light. Cardiovascular:      Rate and Rhythm: Normal rate and regular rhythm. Pulses: Normal pulses. Heart sounds: Normal heart sounds. Pulmonary:      Effort: Pulmonary effort is normal. No respiratory distress. Breath sounds: Normal breath sounds. Abdominal:      General: Abdomen is flat. Bowel sounds are normal.      Tenderness: There is no abdominal tenderness. There is no guarding or rebound. Musculoskeletal:         General: Normal range of motion. Cervical back: Neck supple. Skin:     General: Skin is warm and dry. Capillary Refill: Capillary refill takes less than 2 seconds. Neurological:      General: No focal deficit present. Mental Status: She is alert and oriented to person, place, and time. Mental status is at baseline. Psychiatric:         Mood and Affect: Mood normal.         Behavior: Behavior normal.          MDM  Number of Diagnoses or Management Options  Diagnosis management comments: Patient symptoms are likely related to uncontrolled diabetes. However, she is having visual changes along with new numbness and tingling.   Will obtain head CT to rule out acute intracranial process that could be causing the symptoms. Will check basic labs including CBC and CMP to evaluate kidney function, electrolyte status, and blood sugar levels. We will treat her symptomatically with 1 L normal saline to help lower her blood sugar. Advised patient that while we can bring her blood sugar down acutely in the emergency department if needed, it is important that she follows up with primary care to get on a regimen to better control her blood sugar over time. 1620 -head CT without any acute findings. Blood glucose on . No anion gap present. Will treat patient with 5 units of subcutaneous insulin and reevaluate blood sugar. 1712 -labs unremarkable. Repeat blood sugar after 1 L IV fluid to 84. Will treat patient with 5 units of subcutaneous insulin and discharge patient. Emphasized to patient importance of following up with primary care provider and getting on a regimen to control the blood sugar. Discussed long-term risks of uncontrolled blood sugar. Discussed my clinical impression(s), any labs and/or radiology results with the patient. I answered any questions and addressed any concerns. Discussed the importance of following up with their primary care physician and/or specialist(s). Discussed signs or symptoms that would warrant return back to the ER for further evaluation. The patient is agreeable with discharge.        Amount and/or Complexity of Data Reviewed  Clinical lab tests: ordered and reviewed  Tests in the radiology section of CPT®: ordered and reviewed    Patient Progress  Patient progress: stable         Procedures

## 2022-01-26 NOTE — TELEPHONE ENCOUNTER
Received call from Colette Trevino at [facility] with Red Flag Complaint. Subjective: Caller states \"elevated blood sugars\"     Current Symptoms: [facility] and was put on Januvia and Glipizide. Previously was on Metformin and lantus and humalog 10 months ago but stopped insulin a few months later. No one is currently managing her sugar. Reading was 376 a few minutes, fasting. Has numbness and tingling in left leg and some fingers in left hand right now. Sometimes vision goes black for a few seconds in the morning and comes back. Sugars are always 200-300s. Onset: 10 months ago; was in hospital when diagnosed    Associated Symptoms: NA    Pain Severity: none; none; none    Temperature: none by unknown method    What has been tried: nothing    LMP: finishing today Pregnant: No    Recommended disposition: to be seen today and to set up new patient appointment    Care advice provided, patient verbalizes understanding; denies any other questions or concerns; instructed to call back for any new or worsening symptoms. Writer provided warm transfer to Jordin at [facility] for appointment scheduling    Attention Provider: Thank you for allowing me to participate in the care of your patient. The patient was connected to triage in response to information provided to the [facility]. Please do not respond through this encounter as the response is not directed to a shared pool.         Reason for Disposition   Patient wants to be seen    Protocols used: DIABETES - HIGH BLOOD SUGAR-ADULT-OH

## 2022-02-09 ENCOUNTER — OFFICE VISIT (OUTPATIENT)
Dept: INTERNAL MEDICINE CLINIC | Age: 27
End: 2022-02-09
Payer: MEDICAID

## 2022-02-09 VITALS
TEMPERATURE: 98 F | DIASTOLIC BLOOD PRESSURE: 74 MMHG | SYSTOLIC BLOOD PRESSURE: 109 MMHG | HEIGHT: 72 IN | WEIGHT: 249.1 LBS | OXYGEN SATURATION: 99 % | RESPIRATION RATE: 18 BRPM | BODY MASS INDEX: 33.74 KG/M2 | HEART RATE: 80 BPM

## 2022-02-09 PROCEDURE — 99214 OFFICE O/P EST MOD 30 MIN: CPT | Performed by: INTERNAL MEDICINE

## 2022-02-09 RX ORDER — GLIPIZIDE 2.5 MG/1
TABLET, EXTENDED RELEASE ORAL
COMMUNITY
Start: 2022-01-04 | End: 2022-09-11

## 2022-02-09 RX ORDER — INSULIN LISPRO 100 [IU]/ML
INJECTION, SOLUTION INTRAVENOUS; SUBCUTANEOUS
Qty: 3 ADJUSTABLE DOSE PRE-FILLED PEN SYRINGE | Refills: 3 | Status: SHIPPED | OUTPATIENT
Start: 2022-02-09

## 2022-02-09 RX ORDER — SITAGLIPTIN 50 MG/1
TABLET, FILM COATED ORAL
COMMUNITY
Start: 2022-01-21 | End: 2022-09-11

## 2022-02-09 RX ORDER — INSULIN GLARGINE 100 [IU]/ML
INJECTION, SOLUTION SUBCUTANEOUS
Qty: 3 ADJUSTABLE DOSE PRE-FILLED PEN SYRINGE | Refills: 3 | Status: SHIPPED | OUTPATIENT
Start: 2022-02-09

## 2022-02-09 NOTE — PROGRESS NOTES
Subjective:      Denilson Kellogg is a 31 y.o. female who presents today for   Chief Complaint   Patient presents with    Diabetes   patient in today for management of diabetes. She was seen in ER 1/26/22 with extremely elevated blood sugars symptomatic but not in DKA. She was taking metformin then at [facility] was changed to [place] [facility] [place] and glipizide. She said  she had been compliant with meds. Given IV fuids and insulin  Head CT done due to visual changes was negative  This morning blood sugar fasting was 274    History of gestational diabetes  Father has diabetes  Of note seen 3/22/21 for DKA+---------------------------------------------------------------------------    Has longstanding history of depression    Currently taking  januvia 50 and glipizide CR 2.5 mg daily    Depression is well controlled patient is currently not on any meds not currently seeing psych      Patient Active Problem List    Diagnosis Date Noted    DKA (diabetic ketoacidoses) 03/22/2021    Depression complicating pregnancy, postpartum 08/05/2020    Depressive disorder, not elsewhere classified 01/08/2015    Cannabis abuse 01/08/2015    Unspecified personality disorder 01/08/2015     Current Outpatient Medications   Medication Sig Dispense Refill    glipiZIDE SR (GLUCOTROL XL) 2.5 mg CR tablet       Januvia 50 mg tablet       lancets misc Patient to obtain a blood glucose reading four times daily. Compatible to your glucometer machine/ Kit 100 Each 11    glucose blood VI test strips (blood glucose test) strip Patient to obtain a blood glucose reading four times daily. Compatible to your glucometer machine/ Kit 100 Strip 0    insulin glargine (LANTUS,BASAGLAR) 100 unit/mL (3 mL) inpn 50 Units by SubCUTAneous route daily. (Patient not taking: Reported on 2/9/2022) 5 Pen 0    mupirocin (BACTROBAN) 2 % ointment APPLY TO left upper leg wound Nursing, document site in comments.  Pt has tube from hospital. (Patient not taking: Reported on 2/9/2022) 22 g 0    insulin lispro (HUMALOG) 100 unit/mL kwikpen 10 Units by SubCUTAneous route Before breakfast, lunch, and dinner. (Patient not taking: Reported on 2/9/2022) 1 Package 0    metFORMIN (GLUCOPHAGE) 500 mg tablet Take 1 Tab by mouth two (2) times daily (with meals). (Patient not taking: Reported on 2/9/2022) 60 Tab 0    Insulin Needles, Disposable, 32 gauge x 1/4\" ndle Pen Needle, Diabetic 32 Gauge x 1/4\" (1 box=100 pen needles) (Patient not taking: Reported on 2/9/2022) 100 Pen Needle 0    Blood-Glucose Meter monitoring kit glucometer kit (Meter, Lancets (100), Strips (100)). Patient to obtain a blood glucose reading four times daily. Can use [facility] if not able to afford at your regular pharmacy 1 Kit 0     No Known Allergies  Past Medical History:   Diagnosis Date    ADHD (attention deficit hyperactivity disorder)     Depression     Mood disorder ([facility] [address]     Second hand smoke exposure      Past Surgical History:   Procedure Laterality Date    HX GYN       No family history on file. Social History     Tobacco Use    Smoking status: Current Every Day Smoker     Packs/day: 0.25    Smokeless tobacco: Never Used   Substance Use Topics    Alcohol use: Not Currently     Comment: socially        Review of Systems    A comprehensive review of systems was negative except for that written in the HPI. Objective:     Visit Vitals  /74 (BP 1 Location: Right arm, BP Patient Position: Sitting)   Pulse 80   Temp 98 °F (36.7 °C) (Oral)   Resp 18   Ht 6' (1.829 m)   Wt 249 lb 1.6 oz (113 kg)   LMP 01/28/2022 (Exact Date)   SpO2 99%   BMI 33.78 kg/m²     General:  Alert, cooperative, no distress, appears stated age. Head:  Normocephalic, without obvious abnormality, atraumatic. Eyes:  Conjunctivae/corneas clear. PERRL, EOMs intact. Ears:  Normal TMs and external ear canals both ears. Nose: Nares normal. Septum midline.  Mucosa normal. No drainage or sinus tenderness. Throat: Lips, mucosa, and tongue normal. Teeth and gums normal.   Neck: Supple, symmetrical, trachea midline, no adenopathy, thyroid: no enlargement/tenderness/nodules, no carotid bruit and no JVD. Back:   Symmetric, no curvature. ROM normal. No CVA tenderness. Lungs:   Clear to auscultation bilaterally. Chest wall:  No tenderness or deformity. Heart:  Regular rate and rhythm, S1, S2 normal, no murmur, click, rub or gallop. Abdomen:   Soft, non-tender. Bowel sounds normal. No masses,  No organomegaly. Extremities: Extremities normal, atraumatic, no cyanosis or edema. Pulses: 2+ and symmetric all extremities. Skin: Skin color, texture, turgor normal. No rashes or lesions. Lymph nodes: Cervical, supraclavicular, and axillary nodes normal.   Neurologic: CNII-XII intact. Normal strength, sensation and reflexes throughout. Assessment/Plan:       ICD-10-CM ICD-9-CM    1. Type II diabetes mellitus with complication, uncontrolled ([facility])  O39.1 638.66 METABOLIC PANEL, COMPREHENSIVE    E11.65  LIPID PANEL      HEMOGLOBIN A1C WITH EAG      MICROALBUMIN, UR, RAND W/ MICROALB/CREAT RATIO      REFERRAL TO OPHTHALMOLOGY      REFERRAL TO NUTRITION      REFERRAL TO ENDOCRINOLOGY      MICROALBUMIN, UR, RAND W/ MICROALB/CREAT RATIO      HEMOGLOBIN A1C WITH EAG      LIPID PANEL      METABOLIC PANEL, COMPREHENSIVE     Restart Lantus 25 units     Restart humalog 5 units at mealtime    Continue Januvia 50 mg daily    Stop glipizide    Refer nutrition consult    Refer endocrine consult    Refer ophthalmologist Dr. Nieves Pérez    Does not see podiatrist    2 weeks follow up     Advised her to call back or return to office if symptoms worsen/change/persist.  Discussed expected course/resolution/complications of diagnosis in detail with patient. Medication risks/benefits/costs/interactions/alternatives discussed with patient.   She was given an after visit summary which includes diagnoses, current medications, & vitals. She expressed understanding with the diagnosis and plan.

## 2022-02-09 NOTE — PROGRESS NOTES
Vitaly Chris is a 31 y.o. female    Chief Complaint   Patient presents with    Diabetes     1. Have you been to the ER, urgent care clinic since your last visit? Hospitalized since your last visit? Yes at [facility] on jan 26 2022 for numbness for her leg     2. Have you seen or consulted any other health care providers outside of the [address] [place] [address] since your last visit? Include any pap smears or colon screening.   No

## 2022-02-10 LAB
ALBUMIN SERPL-MCNC: 4.2 G/DL (ref 3.9–5)
ALBUMIN/CREAT UR: <6 MG/G CREAT (ref 0–29)
ALBUMIN/GLOB SERPL: 1.4 {RATIO} (ref 1.2–2.2)
ALP SERPL-CCNC: 82 IU/L (ref 44–121)
ALT SERPL-CCNC: 10 IU/L (ref 0–32)
AST SERPL-CCNC: 8 IU/L (ref 0–40)
BILIRUB SERPL-MCNC: 0.6 MG/DL (ref 0–1.2)
BUN SERPL-MCNC: 5 MG/DL (ref 6–20)
BUN/CREAT SERPL: 9 (ref 9–23)
CALCIUM SERPL-MCNC: 9.6 MG/DL (ref 8.7–10.2)
CHLORIDE SERPL-SCNC: 102 MMOL/L (ref 96–106)
CHOLEST SERPL-MCNC: 213 MG/DL (ref 100–199)
CO2 SERPL-SCNC: 22 MMOL/L (ref 20–29)
CREAT SERPL-MCNC: 0.57 MG/DL (ref 0.57–1)
CREAT UR-MCNC: 53.4 MG/DL
EST. AVERAGE GLUCOSE BLD GHB EST-MCNC: 335 MG/DL
GLOBULIN SER CALC-MCNC: 2.9 G/DL (ref 1.5–4.5)
GLUCOSE SERPL-MCNC: 337 MG/DL (ref 65–99)
HBA1C MFR BLD: 13.3 % (ref 4.8–5.6)
HDLC SERPL-MCNC: 39 MG/DL
LDLC SERPL CALC-MCNC: 149 MG/DL (ref 0–99)
MICROALBUMIN UR-MCNC: <3 UG/ML
POTASSIUM SERPL-SCNC: 4.5 MMOL/L (ref 3.5–5.2)
PROT SERPL-MCNC: 7.1 G/DL (ref 6–8.5)
SODIUM SERPL-SCNC: 138 MMOL/L (ref 134–144)
TRIGL SERPL-MCNC: 140 MG/DL (ref 0–149)
VLDLC SERPL CALC-MCNC: 25 MG/DL (ref 5–40)

## 2022-08-19 LAB — PAP SMEAR, EXTERNAL: NORMAL

## 2022-09-11 ENCOUNTER — HOSPITAL ENCOUNTER (EMERGENCY)
Age: 27
Discharge: HOME | End: 2022-09-11
Attending: EMERGENCY MEDICINE | Admitting: EMERGENCY MEDICINE
Payer: MEDICAID

## 2022-09-11 ENCOUNTER — APPOINTMENT (OUTPATIENT)
Dept: CT IMAGING | Age: 27
End: 2022-09-11
Attending: NURSE PRACTITIONER
Payer: MEDICAID

## 2022-09-11 ENCOUNTER — APPOINTMENT (OUTPATIENT)
Dept: GENERAL RADIOLOGY | Age: 27
End: 2022-09-11
Attending: NURSE PRACTITIONER
Payer: MEDICAID

## 2022-09-11 VITALS
OXYGEN SATURATION: 99 % | SYSTOLIC BLOOD PRESSURE: 120 MMHG | HEART RATE: 85 BPM | RESPIRATION RATE: 16 BRPM | TEMPERATURE: 98 F | DIASTOLIC BLOOD PRESSURE: 75 MMHG

## 2022-09-11 DIAGNOSIS — E11.69 TYPE 2 DIABETES MELLITUS WITH OTHER SPECIFIED COMPLICATION, WITHOUT LONG-TERM CURRENT USE OF INSULIN (HCC): Primary | ICD-10-CM

## 2022-09-11 DIAGNOSIS — R20.0 NUMBNESS: ICD-10-CM

## 2022-09-11 LAB
ALBUMIN SERPL-MCNC: 3.7 G/DL (ref 3.5–5)
ALBUMIN/GLOB SERPL: 0.8 {RATIO} (ref 1.1–2.2)
ALP SERPL-CCNC: 98 U/L (ref 45–117)
ALT SERPL-CCNC: 21 U/L (ref 12–78)
ANION GAP SERPL CALC-SCNC: 4 MMOL/L (ref 5–15)
APPEARANCE UR: CLEAR
AST SERPL-CCNC: 10 U/L (ref 15–37)
BACTERIA URNS QL MICRO: ABNORMAL /HPF
BASE EXCESS BLDV CALC-SCNC: 0.7 MMOL/L
BASOPHILS # BLD: 0.1 K/UL (ref 0–0.1)
BASOPHILS NFR BLD: 1 % (ref 0–1)
BILIRUB SERPL-MCNC: 0.4 MG/DL (ref 0.2–1)
BILIRUB UR QL: NEGATIVE
BUN SERPL-MCNC: 14 MG/DL (ref 6–20)
BUN/CREAT SERPL: 14 (ref 12–20)
CALCIUM SERPL-MCNC: 9.5 MG/DL (ref 8.5–10.1)
CHLORIDE SERPL-SCNC: 101 MMOL/L (ref 97–108)
CO2 SERPL-SCNC: 27 MMOL/L (ref 21–32)
COLOR UR: ABNORMAL
COMMENT, HOLDF: NORMAL
CREAT SERPL-MCNC: 0.98 MG/DL (ref 0.55–1.02)
DIFFERENTIAL METHOD BLD: ABNORMAL
EOSINOPHIL # BLD: 0.2 K/UL (ref 0–0.4)
EOSINOPHIL NFR BLD: 2 % (ref 0–7)
EPITH CASTS URNS QL MICRO: ABNORMAL /LPF
ERYTHROCYTE [DISTWIDTH] IN BLOOD BY AUTOMATED COUNT: 14.6 % (ref 11.5–14.5)
EST. AVERAGE GLUCOSE BLD GHB EST-MCNC: 280 MG/DL
GLOBULIN SER CALC-MCNC: 4.7 G/DL (ref 2–4)
GLUCOSE BLD STRIP.AUTO-MCNC: 380 MG/DL (ref 65–117)
GLUCOSE BLD STRIP.AUTO-MCNC: 543 MG/DL (ref 65–117)
GLUCOSE SERPL-MCNC: 485 MG/DL (ref 65–100)
GLUCOSE UR STRIP.AUTO-MCNC: >1000 MG/DL
HBA1C MFR BLD: 11.4 % (ref 4–5.6)
HCG UR QL: NEGATIVE
HCO3 BLDV-SCNC: 24.9 MMOL/L (ref 23–28)
HCT VFR BLD AUTO: 41.8 % (ref 35–47)
HGB BLD-MCNC: 14 G/DL (ref 11.5–16)
HGB UR QL STRIP: NEGATIVE
HYALINE CASTS URNS QL MICRO: ABNORMAL /LPF (ref 0–5)
IMM GRANULOCYTES # BLD AUTO: 0 K/UL (ref 0–0.04)
IMM GRANULOCYTES NFR BLD AUTO: 0 % (ref 0–0.5)
KETONES UR QL STRIP.AUTO: NEGATIVE MG/DL
LEUKOCYTE ESTERASE UR QL STRIP.AUTO: NEGATIVE
LYMPHOCYTES # BLD: 2.1 K/UL (ref 0.8–3.5)
LYMPHOCYTES NFR BLD: 23 % (ref 12–49)
MCH RBC QN AUTO: 25.1 PG (ref 26–34)
MCHC RBC AUTO-ENTMCNC: 33.5 G/DL (ref 30–36.5)
MCV RBC AUTO: 75 FL (ref 80–99)
MONOCYTES # BLD: 0.5 K/UL (ref 0–1)
MONOCYTES NFR BLD: 6 % (ref 5–13)
NEUTS SEG # BLD: 6.2 K/UL (ref 1.8–8)
NEUTS SEG NFR BLD: 68 % (ref 32–75)
NITRITE UR QL STRIP.AUTO: NEGATIVE
NRBC # BLD: 0 K/UL (ref 0–0.01)
NRBC BLD-RTO: 0 PER 100 WBC
PCO2 BLDV: 38 MMHG (ref 41–51)
PH BLDV: 7.42 [PH] (ref 7.32–7.42)
PH UR STRIP: 6 [PH] (ref 5–8)
PLATELET # BLD AUTO: 233 K/UL (ref 150–400)
PMV BLD AUTO: 11.4 FL (ref 8.9–12.9)
PO2 BLDV: 52 MMHG (ref 25–40)
POTASSIUM SERPL-SCNC: 3.8 MMOL/L (ref 3.5–5.1)
PROT SERPL-MCNC: 8.4 G/DL (ref 6.4–8.2)
PROT UR STRIP-MCNC: NEGATIVE MG/DL
RBC # BLD AUTO: 5.57 M/UL (ref 3.8–5.2)
RBC #/AREA URNS HPF: ABNORMAL /HPF (ref 0–5)
SAMPLES BEING HELD,HOLD: NORMAL
SAO2 % BLDV: 87 % (ref 65–88)
SERVICE CMNT-IMP: ABNORMAL
SODIUM SERPL-SCNC: 132 MMOL/L (ref 136–145)
SPECIMEN TYPE: ABNORMAL
UR CULT HOLD, URHOLD: NORMAL
UROBILINOGEN UR QL STRIP.AUTO: 0.2 EU/DL (ref 0.2–1)
WBC # BLD AUTO: 9.1 K/UL (ref 3.6–11)
WBC URNS QL MICRO: ABNORMAL /HPF (ref 0–4)

## 2022-09-11 PROCEDURE — 82962 GLUCOSE BLOOD TEST: CPT

## 2022-09-11 PROCEDURE — 83036 HEMOGLOBIN GLYCOSYLATED A1C: CPT

## 2022-09-11 PROCEDURE — 70450 CT HEAD/BRAIN W/O DYE: CPT

## 2022-09-11 PROCEDURE — 80053 COMPREHEN METABOLIC PANEL: CPT

## 2022-09-11 PROCEDURE — 81001 URINALYSIS AUTO W/SCOPE: CPT

## 2022-09-11 PROCEDURE — 93005 ELECTROCARDIOGRAM TRACING: CPT

## 2022-09-11 PROCEDURE — 82803 BLOOD GASES ANY COMBINATION: CPT

## 2022-09-11 PROCEDURE — 85025 COMPLETE CBC W/AUTO DIFF WBC: CPT

## 2022-09-11 PROCEDURE — 81025 URINE PREGNANCY TEST: CPT

## 2022-09-11 PROCEDURE — 74011250636 HC RX REV CODE- 250/636: Performed by: EMERGENCY MEDICINE

## 2022-09-11 PROCEDURE — 99285 EMERGENCY DEPT VISIT HI MDM: CPT

## 2022-09-11 PROCEDURE — 72040 X-RAY EXAM NECK SPINE 2-3 VW: CPT

## 2022-09-11 PROCEDURE — 36415 COLL VENOUS BLD VENIPUNCTURE: CPT

## 2022-09-11 RX ORDER — SITAGLIPTIN 50 MG/1
50 TABLET, FILM COATED ORAL DAILY
Qty: 30 TABLET | Refills: 0 | Status: SHIPPED | OUTPATIENT
Start: 2022-09-11

## 2022-09-11 RX ORDER — GLIPIZIDE 2.5 MG/1
5 TABLET, EXTENDED RELEASE ORAL DAILY
Qty: 30 TABLET | Refills: 0 | Status: SHIPPED | OUTPATIENT
Start: 2022-09-11

## 2022-09-11 RX ADMIN — SODIUM CHLORIDE 1000 ML: 9 INJECTION, SOLUTION INTRAVENOUS at 18:55

## 2022-09-11 NOTE — ED TRIAGE NOTES
Pt arrives with CC of left arm and leg numbness/pins and needles that started yesterday around 2200. Pt states the problem has been intermittent for about a week. She also endorses vision problems for about a month that comes and goes. Pt has no facial droop, equal strength in all extremities, and no drift. Hx of diabetes and DKA.

## 2022-09-11 NOTE — ED PROVIDER NOTES
RITA Yepez is a 28 y.o. female with Hx of DMII, ADHD, depression, mood d/o who presents ambulatory to [facility] ED with cc of numbness. Patient reports localized numbness to the left elbow and hand as well as the left lower extremity from the knee downward since last night at 10:00. She states \"it feels like I slept funny on it. \"  She states that the problem is actually going on for about a week and cannot describe any precipitating factors for when it occurs. She also states that she has had intermittent blurring of vision with generalized headaches for approximately 1 month. She denies any history of trauma, falls, injuries. Denies F/C, N/V/D, cough, congestion, CP, SOB,dysuria/ urinary hesitancy/ hematuria or bowel habit changes. Denies any difficulty ambulating, speaking, loss of vision. She does states that she has had excessive thirst with urinary frequency. Of note, patient has diabetes and is unable to recall when the last time she checked her blood sugar. She has not been on medications to manage diabetes either. She denies chance of pregnancy, denies any alcohol, tobacco, substance abuse. PCP: Eliezer Acosta MD    There are no other complaints, changes or physical findings at this time. Past Medical History:   Diagnosis Date    ADHD (attention deficit hyperactivity disorder)     Depression     Mood disorder ([facility] [address]     Second hand smoke exposure        Past Surgical History:   Procedure Laterality Date    HX GYN           No family history on file.     Social History     Socioeconomic History    Marital status: SINGLE     Spouse name: Not on file    Number of children: Not on file    Years of education: Not on file    Highest education level: Not on file   Occupational History    Not on file   Tobacco Use    Smoking status: Every Day     Packs/day: 0.25     Types: Cigarettes    Smokeless tobacco: Never   Substance and Sexual Activity    Alcohol use: Not Currently Comment: socially    Drug use: Yes     Types: Marijuana    Sexual activity: Yes   Other Topics Concern     Service Not Asked    Blood Transfusions Not Asked    Caffeine Concern Not Asked    Occupational Exposure Not Asked    Hobby Hazards Not Asked    Sleep Concern Not Asked    Stress Concern Not Asked    Weight Concern Not Asked    Special Diet Not Asked    Back Care Not Asked    Exercise Not Asked    Bike Helmet Not Asked    Seat Belt Not Asked    Self-Exams Not Asked   Social History Narrative    Not on file     Social Determinants of Health     Financial Resource Strain: Not on file   Food Insecurity: Not on file   Transportation Needs: Not on file   Physical Activity: Not on file   Stress: Not on file   Social Connections: Not on file   Intimate Partner Violence: Not on file   Housing Stability: Not on file         ALLERGIES: Patient has no known allergies. Review of Systems   Constitutional:  Negative for activity change, appetite change, chills and fever. HENT:  Negative for congestion, rhinorrhea and sore throat. Eyes:  Positive for visual disturbance. Negative for pain. Respiratory:  Negative for cough and shortness of breath. Cardiovascular:  Negative for chest pain. Gastrointestinal:  Negative for abdominal pain, diarrhea, nausea and vomiting. Endocrine: Positive for polydipsia and polyuria. Negative for polyphagia. Genitourinary:  Negative for dysuria, flank pain, frequency and urgency. Musculoskeletal:  Negative for arthralgias and neck pain. Skin:  Negative for color change and rash. Neurological:  Positive for numbness and headaches. Negative for dizziness, speech difficulty, weakness and light-headedness. Psychiatric/Behavioral:  Negative for agitation, behavioral problems and confusion. All other systems reviewed and are negative.     Vitals:    09/11/22 1800   BP: 121/74   Pulse: (!) 108   Resp: 16   Temp: 98.2 °F (36.8 °C)   SpO2: 98%            Physical Exam  Vitals and nursing note reviewed. Constitutional:       General: She is not in acute distress. Appearance: She is well-developed. HENT:      Head: Normocephalic and atraumatic. Right Ear: External ear normal.      Left Ear: External ear normal.   Eyes:      Conjunctiva/sclera: Conjunctivae normal.      Pupils: Pupils are equal, round, and reactive to light. Cardiovascular:      Rate and Rhythm: Normal rate and regular rhythm. Heart sounds: Normal heart sounds. Pulmonary:      Effort: Pulmonary effort is normal.      Breath sounds: Normal breath sounds. Abdominal:      Tenderness: There is no guarding. Musculoskeletal:         General: Normal range of motion. Cervical back: Normal range of motion and neck supple. No tenderness. Skin:     General: Skin is warm and dry. Neurological:      Mental Status: She is alert and oriented to person, place, and time. Psychiatric:         Behavior: Behavior normal.         Thought Content: Thought content normal.         Judgment: Judgment normal.        MDM  Number of Diagnoses or Management Options  Numbness  Type 2 diabetes mellitus with other specified complication, without long-term current use of insulin ([facility] [address]  Diagnosis management comments: DDx; radiculopathy, neuropathy, DKA, hyper glycemia     Patient presents to the emergency department with concern for localized numbness in her left arm and left leg over the course of several weeks, but worsening in the last 24 hours. There were no acute or emergent findings on her CT head and neck, she does not demonstrating any other symptoms to be concerning for stroke. Her blood sugar on arrival was in the 500s, no evidence of DKA on her labs, however, we have discussed that her uncontrolled diabetes is likely contributing to some of her visual symptoms as well as possibly her numbness.   She was encouraged to follow-up with a primary care provider for ongoing management of her diabetes, this week. We have discussed ongoing dietary management as well as medications to help manage her symptoms. She states that she was last on Glipizide/ Januvia, both of those prescriptions were refilled. Reasons to return to the emergency department were provided. Amount and/or Complexity of Data Reviewed  Clinical lab tests: reviewed and ordered  Tests in the radiology section of CPT®: reviewed and ordered  Tests in the medicine section of CPT®: reviewed  Review and summarize past medical records: yes  Discuss the patient with other providers: yes Dimitrios Simeon MD )      ED Course as of 09/12/22 0129   Sun Sep 11, 2022   1830 EKG 12 LEAD INITIAL  ED EKG interpretation:  Rhythm: sinus tach. Rate (approx.): 112. Axis: normal.  ST segment:  No concerning ST elevations or depressions. TWI inferior and lateral leads. No sig change compared to 3/23/2021. This EKG was interpreted by Kristin Jansen MD,ED Provider. [JM]      ED Course User Index  [JM] Marj Ulloa MD       Procedures    Presentation, management, and disposition were discussed with the attending physician, Dr. Ellen Wheeler, who is in agreement with plan of care.       LABORATORY TESTS:  Recent Results (from the past 12 hour(s))   EKG, 12 LEAD, INITIAL    Collection Time: 09/11/22  6:11 PM   Result Value Ref Range    Ventricular Rate 112 BPM    Atrial Rate 112 BPM    P-R Interval 126 ms    QRS Duration 86 ms    Q-T Interval 322 ms    QTC Calculation (Bezet) 439 ms    Calculated P Axis 78 degrees    Calculated R Axis 72 degrees    Calculated T Axis -24 degrees    Diagnosis       Sinus tachycardia  Right atrial enlargement  Possible Lateral infarct , age undetermined  Possible Inferior infarct , age undetermined  T wave abnormality, consider anterior ischemia  Abnormal ECG  When compared with ECG of 23-MAR-2021 17:38,  Borderline criteria for Lateral infarct are now present  Borderline criteria for Inferior infarct are now present     CBC WITH AUTOMATED DIFF    Collection Time: 09/11/22  6:20 PM   Result Value Ref Range    WBC 9.1 3.6 - 11.0 K/uL    RBC 5.57 (H) 3.80 - 5.20 M/uL    HGB 14.0 11.5 - 16.0 g/dL    HCT 41.8 35.0 - 47.0 %    MCV 75.0 (L) 80.0 - 99.0 FL    MCH 25.1 (L) 26.0 - 34.0 PG    MCHC 33.5 30.0 - 36.5 g/dL    RDW 14.6 (H) 11.5 - 14.5 %    PLATELET 511 954 - 831 K/uL    MPV 11.4 8.9 - 12.9 FL    NRBC 0.0 0  WBC    ABSOLUTE NRBC 0.00 0.00 - 0.01 K/uL    NEUTROPHILS 68 32 - 75 %    LYMPHOCYTES 23 12 - 49 %    MONOCYTES 6 5 - 13 %    EOSINOPHILS 2 0 - 7 %    BASOPHILS 1 0 - 1 %    IMMATURE GRANULOCYTES 0 0.0 - 0.5 %    ABS. NEUTROPHILS 6.2 1.8 - 8.0 K/UL    ABS. LYMPHOCYTES 2.1 0.8 - 3.5 K/UL    ABS. MONOCYTES 0.5 0.0 - 1.0 K/UL    ABS. EOSINOPHILS 0.2 0.0 - 0.4 K/UL    ABS. BASOPHILS 0.1 0.0 - 0.1 K/UL    ABS. IMM. GRANS. 0.0 0.00 - 0.04 K/UL    DF AUTOMATED     METABOLIC PANEL, COMPREHENSIVE    Collection Time: 09/11/22  6:20 PM   Result Value Ref Range    Sodium 132 (L) 136 - 145 mmol/L    Potassium 3.8 3.5 - 5.1 mmol/L    Chloride 101 97 - 108 mmol/L    CO2 27 21 - 32 mmol/L    Anion gap 4 (L) 5 - 15 mmol/L    Glucose 485 (H) 65 - 100 mg/dL    BUN 14 6 - 20 MG/DL    Creatinine 0.98 0.55 - 1.02 MG/DL    BUN/Creatinine ratio 14 12 - 20      GFR est AA >60 >60 ml/min/1.73m2    GFR est non-AA >60 >60 ml/min/1.73m2    Calcium 9.5 8.5 - 10.1 MG/DL    Bilirubin, total 0.4 0.2 - 1.0 MG/DL    ALT (SGPT) 21 12 - 78 U/L    AST (SGOT) 10 (L) 15 - 37 U/L    Alk. phosphatase 98 45 - 117 U/L    Protein, total 8.4 (H) 6.4 - 8.2 g/dL    Albumin 3.7 3.5 - 5.0 g/dL    Globulin 4.7 (H) 2.0 - 4.0 g/dL    A-G Ratio 0.8 (L) 1.1 - 2.2     SAMPLES BEING HELD    Collection Time: 09/11/22  6:20 PM   Result Value Ref Range    SAMPLES BEING HELD 1RED     COMMENT        Add-on orders for these samples will be processed based on acceptable specimen integrity and analyte stability, which may vary by analyte.    HEMOGLOBIN A1C WITH EAG    Collection Time: 09/11/22  6:20 PM Result Value Ref Range    Hemoglobin A1c 11.4 (H) 4.0 - 5.6 %    Est. average glucose 280 mg/dL   GLUCOSE, POC    Collection Time: 09/11/22  6:28 PM   Result Value Ref Range    Glucose (POC) 543 (H) 65 - 117 mg/dL    Performed by Elizabeth Chance  ED tech    HCG URINE, QL. - POC    Collection Time: 09/11/22  6:53 PM   Result Value Ref Range    Pregnancy test,urine (POC) Negative NEG     POC VENOUS BLOOD GAS    Collection Time: 09/11/22  7:06 PM   Result Value Ref Range    pH, venous (POC) 7.42 7.32 - 7.42      pCO2, venous (POC) 38.0 (L) 41 - 51 MMHG    pO2, venous (POC) 52 (H) 25 - 40 mmHg    HCO3, venous (POC) 24.9 23.0 - 28.0 MMOL/L    sO2, venous (POC) 87.0 65 - 88 %    Base excess, venous (POC) 0.7 mmol/L    Specimen type (POC) VENOUS BLOOD      Performed by Cecille Becerra    GLUCOSE, POC    Collection Time: 09/11/22  8:13 PM   Result Value Ref Range    Glucose (POC) 380 (H) 65 - 117 mg/dL    Performed by Yosvany Simon W/MICROSCOPIC    Collection Time: 09/11/22  8:37 PM   Result Value Ref Range    Color YELLOW/STRAW      Appearance CLEAR CLEAR      pH (UA) 6.0 5.0 - 8.0      Protein Negative NEG mg/dL    Glucose >1,000 (A) NEG mg/dL    Ketone Negative NEG mg/dL    Bilirubin Negative NEG      Blood Negative NEG      Urobilinogen 0.2 0.2 - 1.0 EU/dL    Nitrites Negative NEG      Leukocyte Esterase Negative NEG      WBC 0-4 0 - 4 /hpf    RBC 0-5 0 - 5 /hpf    Epithelial cells FEW FEW /lpf    Bacteria 1+ (A) NEG /hpf    Hyaline cast 0-2 0 - 5 /lpf   URINE CULTURE HOLD SAMPLE    Collection Time: 09/11/22  8:37 PM    Specimen: Serum   Result Value Ref Range    Urine culture hold        Urine on hold in Microbiology dept for 2 days. If unpreserved urine is submitted, it cannot be used for addtional testing after 24 hours, recollection will be required. IMAGING RESULTS:  CT HEAD WO CONT   Final Result      No acute intracranial abnormality.           XR SPINE CERV PA LAT ODONT 3 V MAX   Final Result   No acute fracture or dislocation. MEDICATIONS GIVEN:  Medications   sodium chloride 0.9 % bolus infusion 1,000 mL (0 mL IntraVENous IV Completed 9/11/22 2036)       IMPRESSION:  1. Type 2 diabetes mellitus with other specified complication, without long-term current use of insulin ([facility] [address]    2. Numbness        PLAN:  1. Discharge Medication List as of 9/11/2022  8:29 PM        CONTINUE these medications which have CHANGED    Details   Januvia 50 mg tablet Take 1 Tablet by mouth daily. , Normal, Disp-30 Tablet, R-0, ARNIE      glipiZIDE SR (GLUCOTROL XL) 2.5 mg CR tablet Take 2 Tablets by mouth daily. , Normal, Disp-30 Tablet, R-0           CONTINUE these medications which have NOT CHANGED    Details   !! insulin glargine (LANTUS,BASAGLAR) 100 unit/mL (3 mL) inpn Inject 25 units subcutaneously qhs, Normal, Disp-3 Adjustable Dose Pre-filled Pen Syringe, R-3      !! insulin lispro (HUMALOG) 100 unit/mL kwikpen Inject 5 units subcutaneously at mealtime, Normal, Disp-3 Adjustable Dose Pre-filled Pen Syringe, R-3      !! insulin glargine (LANTUS,BASAGLAR) 100 unit/mL (3 mL) inpn 50 Units by SubCUTAneous route daily. , Normal, Disp-5 Pen, R-0      mupirocin (BACTROBAN) 2 % ointment APPLY TO left upper leg wound Nursing, document site in comments. Pt has tube from hospital., No Print, Disp-22 g, R-0      !! insulin lispro (HUMALOG) 100 unit/mL kwikpen 10 Units by SubCUTAneous route Before breakfast, lunch, and dinner., Normal, Disp-1 Package, R-0      metFORMIN (GLUCOPHAGE) 500 mg tablet Take 1 Tab by mouth two (2) times daily (with meals). , Normal, Disp-60 Tab, R-0      lancets misc Patient to obtain a blood glucose reading four times daily. Compatible to your glucometer machine/ Kit, Normal, Disp-100 Each, R-11      glucose blood VI test strips (blood glucose test) strip Patient to obtain a blood glucose reading four times daily.  Compatible to your glucometer machine/ Kit, Normal, Disp-100 Strip, R-0      Insulin Needles, Disposable, 32 gauge x 1/4\" ndle Pen Needle, Diabetic 32 Gauge x 1/4\" (1 box=100 pen needles), Normal, Disp-100 Pen Needle, R-0      Blood-Glucose Meter monitoring kit glucometer kit (Meter, Lancets (100), Strips (100)). Patient to obtain a blood glucose reading four times daily. Can use [employer] brand at [facility] if not able to afford at your regular pharmacy, Print, Disp-1 Kit, R-0       !! - Potential duplicate medications found. Please discuss with provider. 2.   Follow-up Information       Follow up With Specialties Details Why Contact Info    Catie Reid MD Internal Medicine Physician Schedule an appointment as soon as possible for a visit  You need to be seen this week to have your blood sugar rechecked and get back on your diabetes medications [address], [facility] Emergency Medicine Go to  As needed, If symptoms worsen [address] [place] [address]  [phone]          3. Return to ED if worse     Please note that this dictation was completed with [employer], the computer voice recognition software. Quite often unanticipated grammatical, syntax, homophones, and other interpretive errors are inadvertently transcribed by the computer software. Please disregard these errors. Please excuse any errors that have escaped final proofreading.

## 2022-09-11 NOTE — Clinical Note
[address]  [address] [facility] [address]  [phone]    Work/School Note    Date: 9/11/2022    To Whom It May concern:    Ahsan Ochoa was seen and treated today in the emergency room by the following provider(s):  Attending Provider: Annie Bar MD  Nurse Practitioner: Akbar Resendiz NP. Ahsan Ochoa is excused from work/school on 9/11/2022 through 9/13/2022. She is medically clear to return to work/school on 9/14/2022.          Sincerely,          Preeti Sanon NP

## 2022-09-12 LAB
ATRIAL RATE: 112 BPM
CALCULATED P AXIS, ECG09: 78 DEGREES
CALCULATED R AXIS, ECG10: 72 DEGREES
CALCULATED T AXIS, ECG11: -24 DEGREES
DIAGNOSIS, 93000: NORMAL
P-R INTERVAL, ECG05: 126 MS
Q-T INTERVAL, ECG07: 322 MS
QRS DURATION, ECG06: 86 MS
QTC CALCULATION (BEZET), ECG08: 439 MS
VENTRICULAR RATE, ECG03: 112 BPM

## 2022-09-12 NOTE — DISCHARGE INSTRUCTIONS
CT of head your and neck x-ray are normal   You are not in diabetic crisis, but we are very concerned about your blood sugars and diabetes management and feel that some of your other symptoms could be related to high blood sugars   Please see your primary care this week to have your blood sugar managed, we would prefer earlier this week rather than later   Take diabetes medications as directed   Make sure that you are following a diabetic diet (nutritional information is included in your discharge papers)   Return to the ER for any worsening or worrisome symptoms

## 2022-09-27 ENCOUNTER — NURSE TRIAGE (OUTPATIENT)
Dept: OTHER | Facility: CLINIC | Age: 27
End: 2022-09-27

## 2022-09-27 NOTE — TELEPHONE ENCOUNTER
Received call from Shad Augustin at [facility] with Red Flag Complaint. Subjective: Caller states \"numbness in hands\"     Current Symptoms: Seen in the ED 9/11, for blurry vision, blackout, numbness in hands. States those sx have improved. Vaginal discharge and itching, white/yellow discharge. Onset: 2 weeks ago; unchanged    Associated Symptoms: NA    Pain Severity: 0/10; Temperature: denies fever    What has been tried: None    LMP:  9/5/22  Pregnant: No    Recommended disposition: See PCP within 3 Days    Care advice provided, patient verbalizes understanding; denies any other questions or concerns; instructed to call back for any new or worsening symptoms. Patient/Caller agrees with recommended disposition; writer provided warm transfer to Giuliana Natarajan at [facility] for appointment scheduling    Attention Provider: Thank you for allowing me to participate in the care of your patient. The patient was connected to triage in response to information provided to the ECC. Please do not respond through this encounter as the response is not directed to a shared pool.       Reason for Disposition   Bad smelling vaginal discharge    Protocols used: Vaginal Discharge-ADULT-AH

## 2022-12-08 ENCOUNTER — HOSPITAL ENCOUNTER (EMERGENCY)
Age: 27
Discharge: HOME | End: 2022-12-09
Attending: STUDENT IN AN ORGANIZED HEALTH CARE EDUCATION/TRAINING PROGRAM
Payer: MEDICAID

## 2022-12-08 DIAGNOSIS — R73.9 HYPERGLYCEMIA: Primary | ICD-10-CM

## 2022-12-08 PROCEDURE — 99283 EMERGENCY DEPT VISIT LOW MDM: CPT

## 2022-12-08 PROCEDURE — 82962 GLUCOSE BLOOD TEST: CPT

## 2022-12-09 VITALS
SYSTOLIC BLOOD PRESSURE: 137 MMHG | HEIGHT: 72 IN | HEART RATE: 95 BPM | DIASTOLIC BLOOD PRESSURE: 77 MMHG | BODY MASS INDEX: 33.86 KG/M2 | WEIGHT: 250 LBS | RESPIRATION RATE: 17 BRPM | OXYGEN SATURATION: 99 % | TEMPERATURE: 98.2 F

## 2022-12-09 LAB
ALBUMIN SERPL-MCNC: 3.5 G/DL (ref 3.5–5)
ALBUMIN/GLOB SERPL: 0.9 {RATIO} (ref 1.1–2.2)
ALP SERPL-CCNC: 78 U/L (ref 45–117)
ALT SERPL-CCNC: 23 U/L (ref 12–78)
ANION GAP SERPL CALC-SCNC: 4 MMOL/L (ref 5–15)
APPEARANCE UR: CLEAR
AST SERPL-CCNC: 14 U/L (ref 15–37)
BACTERIA URNS QL MICRO: ABNORMAL /HPF
BASOPHILS # BLD: 0 K/UL (ref 0–0.1)
BASOPHILS NFR BLD: 0 % (ref 0–1)
BILIRUB SERPL-MCNC: 0.6 MG/DL (ref 0.2–1)
BILIRUB UR QL: NEGATIVE
BUN SERPL-MCNC: 8 MG/DL (ref 6–20)
BUN/CREAT SERPL: 11 (ref 12–20)
CALCIUM SERPL-MCNC: 9.1 MG/DL (ref 8.5–10.1)
CHLORIDE SERPL-SCNC: 109 MMOL/L (ref 97–108)
CO2 SERPL-SCNC: 26 MMOL/L (ref 21–32)
COLOR UR: ABNORMAL
COMMENT, HOLDF: NORMAL
CREAT SERPL-MCNC: 0.71 MG/DL (ref 0.55–1.02)
DIFFERENTIAL METHOD BLD: ABNORMAL
EOSINOPHIL # BLD: 0.2 K/UL (ref 0–0.4)
EOSINOPHIL NFR BLD: 3 % (ref 0–7)
EPITH CASTS URNS QL MICRO: ABNORMAL /LPF
ERYTHROCYTE [DISTWIDTH] IN BLOOD BY AUTOMATED COUNT: 14.6 % (ref 11.5–14.5)
EST. AVERAGE GLUCOSE BLD GHB EST-MCNC: 295 MG/DL
GLOBULIN SER CALC-MCNC: 3.9 G/DL (ref 2–4)
GLUCOSE BLD STRIP.AUTO-MCNC: 212 MG/DL (ref 65–117)
GLUCOSE SERPL-MCNC: 240 MG/DL (ref 65–100)
GLUCOSE UR STRIP.AUTO-MCNC: >1000 MG/DL
HBA1C MFR BLD: 11.9 % (ref 4–5.6)
HCT VFR BLD AUTO: 35.9 % (ref 35–47)
HGB BLD-MCNC: 11.6 G/DL (ref 11.5–16)
HGB UR QL STRIP: NEGATIVE
HYALINE CASTS URNS QL MICRO: ABNORMAL /LPF (ref 0–5)
IMM GRANULOCYTES # BLD AUTO: 0 K/UL (ref 0–0.04)
IMM GRANULOCYTES NFR BLD AUTO: 0 % (ref 0–0.5)
KETONES UR QL STRIP.AUTO: NEGATIVE MG/DL
LEUKOCYTE ESTERASE UR QL STRIP.AUTO: NEGATIVE
LYMPHOCYTES # BLD: 2.9 K/UL (ref 0.8–3.5)
LYMPHOCYTES NFR BLD: 36 % (ref 12–49)
MCH RBC QN AUTO: 25.1 PG (ref 26–34)
MCHC RBC AUTO-ENTMCNC: 32.3 G/DL (ref 30–36.5)
MCV RBC AUTO: 77.5 FL (ref 80–99)
MONOCYTES # BLD: 0.4 K/UL (ref 0–1)
MONOCYTES NFR BLD: 5 % (ref 5–13)
NEUTS SEG # BLD: 4.5 K/UL (ref 1.8–8)
NEUTS SEG NFR BLD: 56 % (ref 32–75)
NITRITE UR QL STRIP.AUTO: NEGATIVE
NRBC # BLD: 0 K/UL (ref 0–0.01)
NRBC BLD-RTO: 0 PER 100 WBC
PH UR STRIP: 7 [PH] (ref 5–8)
PLATELET # BLD AUTO: 222 K/UL (ref 150–400)
PMV BLD AUTO: 11.3 FL (ref 8.9–12.9)
POTASSIUM SERPL-SCNC: 3.6 MMOL/L (ref 3.5–5.1)
PROT SERPL-MCNC: 7.4 G/DL (ref 6.4–8.2)
PROT UR STRIP-MCNC: NEGATIVE MG/DL
RBC # BLD AUTO: 4.63 M/UL (ref 3.8–5.2)
RBC #/AREA URNS HPF: ABNORMAL /HPF (ref 0–5)
SAMPLES BEING HELD,HOLD: NORMAL
SERVICE CMNT-IMP: ABNORMAL
SODIUM SERPL-SCNC: 139 MMOL/L (ref 136–145)
SP GR UR REFRACTOMETRY: 1.02 (ref 1–1.03)
UROBILINOGEN UR QL STRIP.AUTO: 0.2 EU/DL (ref 0.2–1)
WBC # BLD AUTO: 8.1 K/UL (ref 3.6–11)
WBC URNS QL MICRO: ABNORMAL /HPF (ref 0–4)

## 2022-12-09 PROCEDURE — 85025 COMPLETE CBC W/AUTO DIFF WBC: CPT

## 2022-12-09 PROCEDURE — 83036 HEMOGLOBIN GLYCOSYLATED A1C: CPT

## 2022-12-09 PROCEDURE — 81001 URINALYSIS AUTO W/SCOPE: CPT

## 2022-12-09 PROCEDURE — 80053 COMPREHEN METABOLIC PANEL: CPT

## 2022-12-09 NOTE — ED PROVIDER NOTES
Patient is a 26-year-old female with a history of diabetes who presents ED for evaluation of elevated blood sugar level. Patient reports that she was previously multiple medications include metformin, insulin, Januvia and glipizide. Patient is stopped taking medication about a year or so ago. She does have a follow-up appointment in February for endocrinologist.  Patient has been unable to follow-up with her PCP. Patient is in the ED today because she was at work and at [employer] when she got lightheaded. She was seen by the nurse and was told that her blood sugar was high and that she need to come to the emergency department. Patient denies any headaches, nausea vomiting abdominal pain. She has no other symptoms at this time. Is requesting to be placed back on her metformin. High Blood Sugar   Pertinent negatives include no fever, no nausea, no vomiting, no headaches and no chest pain. Past Medical History:   Diagnosis Date    ADHD (attention deficit hyperactivity disorder)     Depression     Mood disorder ([address]     Second hand smoke exposure        Past Surgical History:   Procedure Laterality Date    HX GYN           No family history on file.     Social History     Socioeconomic History    Marital status: SINGLE     Spouse name: Not on file    Number of children: Not on file    Years of education: Not on file    Highest education level: Not on file   Occupational History    Not on file   Tobacco Use    Smoking status: Every Day     Packs/day: 0.25     Types: Cigarettes    Smokeless tobacco: Never   Substance and Sexual Activity    Alcohol use: Not Currently     Comment: socially    Drug use: Yes     Types: Marijuana    Sexual activity: Yes   Other Topics Concern     Service Not Asked    Blood Transfusions Not Asked    Caffeine Concern Not Asked    Occupational Exposure Not Asked    Hobby Hazards Not Asked    Sleep Concern Not Asked    Stress Concern Not Asked    Weight Concern Not Asked    Special Diet Not Asked    Back Care Not Asked    Exercise Not Asked    Bike Helmet Not Asked    Seat Belt Not Asked    Self-Exams Not Asked   Social History Narrative    Not on file     Social Determinants of Health     Financial Resource Strain: Not on file   Food Insecurity: Not on file   Transportation Needs: Not on file   Physical Activity: Not on file   Stress: Not on file   Social Connections: Not on file   Intimate Partner Violence: Not on file   Housing Stability: Not on file         ALLERGIES: Patient has no known allergies. Review of Systems   Constitutional:  Negative for fever. Respiratory:  Negative for shortness of breath. Cardiovascular:  Negative for chest pain. Gastrointestinal:  Negative for abdominal pain, nausea and vomiting. Neurological:  Negative for weakness and headaches. All other systems reviewed and are negative. Vitals:    12/08/22 2356 12/09/22 0001   BP: 137/77    Pulse: 95    Resp: 17    Temp: 98.2 °F (36.8 °C)    SpO2: 99% 99%   Weight: 113.4 kg (250 lb)    Height: 6' (1.829 m)             Physical Exam  Vitals and nursing note reviewed. Constitutional:       General: She is not in acute distress. Appearance: Normal appearance. She is not ill-appearing. HENT:      Head: Normocephalic and atraumatic. Right Ear: External ear normal.      Left Ear: External ear normal.      Nose: Nose normal.      Mouth/Throat:      Mouth: Mucous membranes are moist.      Pharynx: Oropharynx is clear. Eyes:      Extraocular Movements: Extraocular movements intact. Conjunctiva/sclera: Conjunctivae normal.   Cardiovascular:      Rate and Rhythm: Normal rate and regular rhythm. Pulses: Normal pulses. Heart sounds: Normal heart sounds. Pulmonary:      Effort: Pulmonary effort is normal. No respiratory distress. Breath sounds: Normal breath sounds. No wheezing. Abdominal:      General: Abdomen is flat.  Bowel sounds are normal. Palpations: Abdomen is soft. Tenderness: There is no abdominal tenderness. There is no guarding. Genitourinary:     Comments: Deferred  Musculoskeletal:         General: No swelling. Normal range of motion. Cervical back: Normal range of motion. Skin:     General: Skin is warm and dry. Capillary Refill: Capillary refill takes less than 2 seconds. Findings: No rash. Neurological:      General: No focal deficit present. Mental Status: She is alert and oriented to person, place, and time. Comments: Moving all extremities   Psychiatric:         Mood and Affect: Mood normal.         Behavior: Behavior normal.      Comments: Has decision making capacity        MDM  Number of Diagnoses or Management Options  Hyperglycemia  Diagnosis management comments: Differential includes  hyperglycemia, uncontrolled diabetes, diabetic ketoacidosis. Well-appearing 25-year-old female was sent from work for evaluation of lightheadedness. Patient currently not symptomatic. Laboratory studies were collected in triage this included CBC which shows no leukocytosis, serum chemistries show glucose of 240, CO2 of 26 with anion gap of 4. Patient is not in diabetic ketoacidosis. A1c was collected is 11.9. Urinalysis shows glucose urea. Have offered to give the patient IV fluids and insulin. Patient reports that her mother is currently with her children and that she needs to be discharged home. This had a long discussion regarding diabetic medications. Given we are not primary care facility and that I would not be seeing her in follow-up I discussed that I could not start her on these medications especially since she does not have close follow-up. I did give her several primary care physicians to follow-up with regarding her diabetes management. She has no symptom this time, hemodynamic stable be discharged home.            Procedures

## 2022-12-09 NOTE — ED TRIAGE NOTES
Pt states she was at work and she became light headed and her blood sugar was checked and ut was 182. She ate a hotdog and FF coming here and BGL in triage is 212. Nancy Remed[employer] Pt used to be in insulin and metformin and stopped taking about 2 years ago.   States she has an appt in Feb for an endocrinologist and shes requesting a Rx for metformin until she an make appt

## 2022-12-09 NOTE — DISCHARGE INSTRUCTIONS
Please make an appointment with any of the above doctors to manage your diabetes.  Return to the ER if any worsening or concerning symptoms such as nausea, vomiting, passing out, or any other concerning symptoms

## 2022-12-28 ENCOUNTER — HOSPITAL ENCOUNTER (EMERGENCY)
Age: 27
Discharge: HOME | End: 2022-12-28
Attending: EMERGENCY MEDICINE
Payer: MEDICAID

## 2022-12-28 VITALS
TEMPERATURE: 98.6 F | OXYGEN SATURATION: 100 % | DIASTOLIC BLOOD PRESSURE: 67 MMHG | SYSTOLIC BLOOD PRESSURE: 122 MMHG | BODY MASS INDEX: 33.32 KG/M2 | WEIGHT: 238 LBS | RESPIRATION RATE: 16 BRPM | HEIGHT: 71 IN | HEART RATE: 89 BPM

## 2022-12-28 DIAGNOSIS — Z11.3 SCREEN FOR STD (SEXUALLY TRANSMITTED DISEASE): Primary | ICD-10-CM

## 2022-12-28 LAB
APPEARANCE UR: CLEAR
BACTERIA URNS QL MICRO: ABNORMAL /HPF
BILIRUB UR QL: NEGATIVE
C TRACH DNA SPEC QL NAA+PROBE: NEGATIVE
CLUE CELLS VAG QL WET PREP: NORMAL
COLOR UR: ABNORMAL
EPITH CASTS URNS QL MICRO: ABNORMAL /LPF
GLUCOSE UR STRIP.AUTO-MCNC: NEGATIVE MG/DL
HCG UR QL: NEGATIVE
HGB UR QL STRIP: NEGATIVE
KETONES UR QL STRIP.AUTO: NEGATIVE MG/DL
KOH PREP SPEC: NORMAL
LEUKOCYTE ESTERASE UR QL STRIP.AUTO: ABNORMAL
N GONORRHOEA DNA SPEC QL NAA+PROBE: POSITIVE
NITRITE UR QL STRIP.AUTO: NEGATIVE
PH UR STRIP: 6.5 [PH] (ref 5–8)
PROT UR STRIP-MCNC: NEGATIVE MG/DL
RBC #/AREA URNS HPF: ABNORMAL /HPF (ref 0–5)
SAMPLE TYPE: ABNORMAL
SERVICE CMNT-IMP: ABNORMAL
SERVICE CMNT-IMP: NORMAL
SP GR UR REFRACTOMETRY: 1.02
SPECIMEN SOURCE: ABNORMAL
T VAGINALIS VAG QL WET PREP: NORMAL
UA: UC IF INDICATED,UAUC: ABNORMAL
UROBILINOGEN UR QL STRIP.AUTO: 1 EU/DL (ref 0.2–1)
WBC URNS QL MICRO: ABNORMAL /HPF (ref 0–4)

## 2022-12-28 PROCEDURE — 74011250636 HC RX REV CODE- 250/636: Performed by: PHYSICIAN ASSISTANT

## 2022-12-28 PROCEDURE — 99284 EMERGENCY DEPT VISIT MOD MDM: CPT

## 2022-12-28 PROCEDURE — 96372 THER/PROPH/DIAG INJ SC/IM: CPT

## 2022-12-28 PROCEDURE — 87491 CHLMYD TRACH DNA AMP PROBE: CPT

## 2022-12-28 PROCEDURE — 81001 URINALYSIS AUTO W/SCOPE: CPT

## 2022-12-28 PROCEDURE — 81025 URINE PREGNANCY TEST: CPT

## 2022-12-28 PROCEDURE — 74011250637 HC RX REV CODE- 250/637: Performed by: PHYSICIAN ASSISTANT

## 2022-12-28 PROCEDURE — 87210 SMEAR WET MOUNT SALINE/INK: CPT

## 2022-12-28 PROCEDURE — 74011000250 HC RX REV CODE- 250: Performed by: PHYSICIAN ASSISTANT

## 2022-12-28 RX ORDER — AZITHROMYCIN 500 MG/1
1000 TABLET, FILM COATED ORAL
Status: COMPLETED | OUTPATIENT
Start: 2022-12-28 | End: 2022-12-28

## 2022-12-28 RX ORDER — AZITHROMYCIN 500 MG/1
500 TABLET, FILM COATED ORAL
Status: DISCONTINUED | OUTPATIENT
Start: 2022-12-28 | End: 2022-12-28

## 2022-12-28 RX ADMIN — LIDOCAINE HYDROCHLORIDE 500 MG: 10 INJECTION, SOLUTION EPIDURAL; INFILTRATION; INTRACAUDAL; PERINEURAL at 12:43

## 2022-12-28 RX ADMIN — AZITHROMYCIN MONOHYDRATE 1000 MG: 500 TABLET ORAL at 12:43

## 2022-12-28 NOTE — ED NOTES
Discharge instructions were given to the patient by Lexy Early RN. The patient left the Emergency Department ambulatory, alert and oriented and in no acute distress with 0 prescriptions. The patient was encouraged to call or return to the ED for worsening issues or problems and was encouraged to schedule a follow up appointment for continuing care. The patient verbalized understanding of discharge instructions and prescriptions, all questions were answered. The patient has no further concerns at this time.

## 2022-12-28 NOTE — ED PROVIDER NOTES
EMERGENCY DEPARTMENT HISTORY AND PHYSICAL EXAM      Date: 12/28/2022  Patient Name: Johnna Altamirano    History of Presenting Illness     Chief Complaint   Patient presents with    Exposure to STD     Pt requesting to be checked for STDs, only symptom is abd cramping. Denies discharge, denies urinary symptoms        History Provided By: Patient    HPI: Johnna Altamirano, 28 y.o. female with PMHx ADHD, depression, and additional history as noted below, per patient and record review, presents  to the ED with cc of concern for possible STD exposure. Patient states she had abdominal cramping on initial triage, although on my evaluation patient states she is asymptomatic. Denies changes in bowel or bladder habits. Denies any urinary urgency, frequency, burning, hematuria. Denies any vaginal discharge or bleeding. Denies any rashes, bumps, lesions to groin. No symptomatic management techniques prior arrival.  No modifying factors. States she is otherwise in her usual state of health with no other complaints at this time. There are no other complaints, changes, or physical findings at this time. PCP: Rebeca Yu MD    Current Outpatient Medications   Medication Sig Dispense Refill    Januvia 50 mg tablet Take 1 Tablet by mouth daily. 30 Tablet 0    glipiZIDE SR (GLUCOTROL XL) 2.5 mg CR tablet Take 2 Tablets by mouth daily. 30 Tablet 0    insulin glargine (LANTUS,BASAGLAR) 100 unit/mL (3 mL) inpn Inject 25 units subcutaneously qhs 3 Adjustable Dose Pre-filled Pen Syringe 3    insulin lispro (HUMALOG) 100 unit/mL kwikpen Inject 5 units subcutaneously at mealtime 3 Adjustable Dose Pre-filled Pen Syringe 3    insulin glargine (LANTUS,BASAGLAR) 100 unit/mL (3 mL) inpn 50 Units by SubCUTAneous route daily. (Patient not taking: No sig reported) 5 Pen 0    mupirocin (BACTROBAN) 2 % ointment APPLY TO left upper leg wound Nursing, document site in comments.  Pt has tube from hospital. (Patient not taking: No sig reported) 22 g 0    insulin lispro (HUMALOG) 100 unit/mL kwikpen 10 Units by SubCUTAneous route Before breakfast, lunch, and dinner. (Patient not taking: No sig reported) 1 Package 0    metFORMIN (GLUCOPHAGE) 500 mg tablet Take 1 Tab by mouth two (2) times daily (with meals). (Patient not taking: No sig reported) 60 Tab 0    lancets misc Patient to obtain a blood glucose reading four times daily. Compatible to your glucometer machine/ Kit 100 Each 11    glucose blood VI test strips (blood glucose test) strip Patient to obtain a blood glucose reading four times daily. Compatible to your glucometer machine/ Kit 100 Strip 0    Insulin Needles, Disposable, 32 gauge x 1/4\" ndle Pen Needle, Diabetic 32 Gauge x 1/4\" (1 box=100 pen needles) (Patient not taking: No sig reported) 100 Pen Needle 0    Blood-Glucose Meter monitoring kit glucometer kit (Meter, Lancets (100), Strips (100)). Patient to obtain a blood glucose reading four times daily. Can use Fubles at [facility] if not able to afford at your regular pharmacy 1 Kit 0     Past History     Past Medical History:  Past Medical History:   Diagnosis Date    ADHD (attention deficit hyperactivity disorder)     Depression     Mood disorder ([facility] [address]     Second hand smoke exposure        Past Surgical History:  Past Surgical History:   Procedure Laterality Date    HX GYN         Family History:  History reviewed. No pertinent family history. Social History:  Social History     Tobacco Use    Smoking status: Every Day     Packs/day: 0.25     Types: Cigarettes    Smokeless tobacco: Never    Tobacco comments:     Black and Milds   Substance Use Topics    Alcohol use: Yes     Comment: socially    Drug use: Yes     Types: Marijuana       Allergies:  No Known Allergies  Review of Systems   Review of Systems   Constitutional:  Negative for appetite change, chills and fever. HENT:  Negative for congestion. Eyes:  Negative for pain.    Respiratory:  Negative for cough and shortness of breath. Cardiovascular:  Negative for chest pain. Gastrointestinal:  Negative for constipation, diarrhea, nausea and vomiting. Genitourinary:  Negative for decreased urine volume, difficulty urinating, dysuria, frequency, genital sores, hematuria, menstrual problem, pelvic pain, urgency, vaginal bleeding, vaginal discharge and vaginal pain. Musculoskeletal:  Negative for neck pain and neck stiffness. Skin:  Negative for rash. Neurological:  Negative for syncope and headaches. All other systems reviewed and are negative. Physical Exam   Physical Exam  Vitals and nursing note reviewed. Constitutional:       General: She is not in acute distress. Appearance: Normal appearance. She is not ill-appearing, toxic-appearing or diaphoretic. Comments: 28 y.o. female   HENT:      Head: Normocephalic and atraumatic. Right Ear: External ear normal.      Left Ear: External ear normal.      Nose: Nose normal.      Mouth/Throat:      Mouth: Mucous membranes are moist.   Eyes:      Extraocular Movements: Extraocular movements intact. Conjunctiva/sclera: Conjunctivae normal.   Cardiovascular:      Rate and Rhythm: Normal rate. Pulmonary:      Effort: Pulmonary effort is normal. No respiratory distress. Abdominal:      General: There is no distension. Palpations: There is no mass. Tenderness: There is no abdominal tenderness. There is no right CVA tenderness, guarding or rebound. Genitourinary:     Comments: Declines   Musculoskeletal:         General: Normal range of motion. Cervical back: Normal range of motion. Skin:     General: Skin is warm and dry. Neurological:      General: No focal deficit present. Mental Status: She is alert and oriented to person, place, and time.    Psychiatric:         Mood and Affect: Mood normal.         Behavior: Behavior normal.     Diagnostic Study Results     Labs -     Recent Results (from the past 12 hour(s)) URINALYSIS W/ REFLEX CULTURE    Collection Time: 12/28/22 12:13 PM    Specimen: Urine   Result Value Ref Range    Color YELLOW/STRAW      Appearance CLEAR CLEAR      Specific gravity 1.020      pH (UA) 6.5 5.0 - 8.0      Protein Negative NEG mg/dL    Glucose Negative NEG mg/dL    Ketone Negative NEG mg/dL    Bilirubin Negative NEG      Blood Negative NEG      Urobilinogen 1.0 0.2 - 1.0 EU/dL    Nitrites Negative NEG      Leukocyte Esterase SMALL (A) NEG      WBC 0-4 0 - 4 /hpf    RBC 0-5 0 - 5 /hpf    Epithelial cells MODERATE (A) FEW /lpf    Bacteria 1+ (A) NEG /hpf    UA:UC IF INDICATED CULTURE NOT INDICATED BY UA RESULT CNI     KOH, OTHER SOURCES    Collection Time: 12/28/22 12:13 PM    Specimen: Vagina; Other   Result Value Ref Range    Special Requests: NO SPECIAL REQUESTS      KOH NO YEAST SEEN     WET PREP    Collection Time: 12/28/22 12:13 PM    Specimen: Miscellaneous sample   Result Value Ref Range    Clue cells CLUE CELLS ABSENT      Wet prep NO TRICHOMONAS SEEN     HCG URINE, QL. - POC    Collection Time: 12/28/22 12:21 PM   Result Value Ref Range    Pregnancy test,urine (POC) Negative NEG         Radiologic Studies -   No orders to display     CT Results  (Last 48 hours)      None          CXR Results  (Last 48 hours)      None          Medical Decision Making   I am the first provider for this patient. I reviewed the vital signs, available nursing notes, past medical history, past surgical history, family history and social history. Vital Signs-Reviewed the patient's vital signs.   Patient Vitals for the past 12 hrs:   Temp Pulse Resp BP SpO2   12/28/22 1150 98.6 °F (37 °C) 89 16 122/67 100 %       Pulse Oximetry Analysis - 100% on RA    Records Reviewed: Nursing Notes and Old Medical Records    Provider Notes (Medical Decision Making):   No barriers to care       Patient presents to the ED for concern for STD exposure   Elects prophylactic treatment; given rocephin and azithro here in the emergency department   Differential diagnosis includes but not limited to UTI, gonorrhea, chlamydia, yeast, trichomonas; no evidence of emergent conditions requiring further evaluation/management acutely here at this time. Shared decision making performed and care plan created together, discussed results, diagnosis and treatment plan. Counseled symptomatic management techniques. PCP/OBGYN and health department follow-up. Verbal return precautions advised. Patient verbalizes understanding and agreement of current plan of care. ED Course:   Initial assessment performed. The patients presenting problems have been discussed, and they are in agreement with the care plan formulated and outlined with them. I have encouraged them to ask questions as they arise throughout their visit. Disposition:  Discharge     PLAN:  1. Current Discharge Medication List        2. Follow-up Information       Follow up With Specialties Details Why [facility] EMERGENCY DEPT Emergency Medicine  As needed, If symptoms worsen [address]    Charito Arreguin MD Internal Medicine Physician In 1 week  [address] [address]  [phone]      [facility]  In 1 week  [place]  [id number]  [phone]          Return to ED if worse     Diagnosis     Clinical Impression:   1.  Screen for STD (sexually transmitted disease)

## 2022-12-28 NOTE — DISCHARGE INSTRUCTIONS
Thank You! It was a pleasure taking care of you in our Emergency Department today. We know that when you come to [address] [place] [address], you are entrusting us with your health, comfort, and safety. Our clinicians honor that trust, and truly appreciate the opportunity to care for you and your loved ones. We also value your feedback. If you receive a survey about your Emergency Department experience today, please fill it out. We care about our patients' feedback, and we listen to what you have to say. Thank you. Katelin Cates PA-C     ____________________________________________________________________  I have included a copy of your lab results and/or radiologic studies from today's visit so you can have them easily available at your follow-up visit. We hope you feel better and please do not hesitate to contact the ED if you have any questions at all! Recent Results (from the past 12 hour(s))   URINALYSIS W/ REFLEX CULTURE    Collection Time: 12/28/22 12:13 PM    Specimen: Urine   Result Value Ref Range    Color YELLOW/STRAW      Appearance CLEAR CLEAR      Specific gravity 1.020      pH (UA) 6.5 5.0 - 8.0      Protein Negative NEG mg/dL    Glucose Negative NEG mg/dL    Ketone Negative NEG mg/dL    Bilirubin Negative NEG      Blood Negative NEG      Urobilinogen 1.0 0.2 - 1.0 EU/dL    Nitrites Negative NEG      Leukocyte Esterase SMALL (A) NEG      WBC 0-4 0 - 4 /hpf    RBC 0-5 0 - 5 /hpf    Epithelial cells MODERATE (A) FEW /lpf    Bacteria 1+ (A) NEG /hpf    UA:UC IF INDICATED CULTURE NOT INDICATED BY UA RESULT CNI     KOH, OTHER SOURCES    Collection Time: 12/28/22 12:13 PM    Specimen: Vagina;  Other   Result Value Ref Range    Special Requests: NO SPECIAL REQUESTS      KOH NO YEAST SEEN     WET PREP    Collection Time: 12/28/22 12:13 PM    Specimen: Miscellaneous sample   Result Value Ref Range    Clue cells CLUE CELLS ABSENT      Wet prep NO TRICHOMONAS SEEN     HCG URINE, QL. - POC    Collection Time: 12/28/22 12:21 PM   Result Value Ref Range    Pregnancy test,urine (POC) Negative NEG         No orders to display     CT Results  (Last 48 hours)      None          The exam and treatment you received in the Emergency Department were for an urgent problem and are not intended as complete care. It is important that you follow up with a doctor, nurse practitioner, or physician assistant for ongoing care. If your symptoms become worse or you do not improve as expected and you are unable to reach your usual health care provider, you should return to the Emergency Department. We are available 24 hours a day. Please take your discharge instructions with you when you go to your follow-up appointment. If a prescription has been provided, please have it filled as soon as possible to prevent a delay in treatment. Read the entire medication instruction sheet provided to you by the pharmacy. If you have any questions or reservations about taking the medication due to side effects or interactions with other medications, please call your primary care physician or contact the ER to speak with the charge nurse. Please make an appointment with your family doctor or the physician you were referred to for follow-up of this visit as instructed on your discharge paperwork. Return to the ER if you are unable to be seen or if you are unable to be seen in a timely manner. If you have any problem arranging the follow-up visit, contact the Emergency Department immediately.

## 2022-12-28 NOTE — ED NOTES
Pt presents to ED complaining of lower abdominal pain and concern for STDs. Pt presents with male sexual partner who is having penile dischargte. Pt is alert and oriented x 4, RR even and unlabored, skin is warm and dry. Assessment completed and pt updated on plan of care. Call bell in reach. Emergency Department Nursing Plan of Care       The Nursing Plan of Care is developed from the Nursing assessment and Emergency Department Attending provider initial evaluation. The plan of care may be reviewed in the ED Provider note.     The Plan of Care was developed with the following considerations:   Patient / Family readiness to learn indicated by:verbalized understanding  Persons(s) to be included in education: patient  Barriers to Learning/Limitations:No    Signed     Victory Bence, RN    12/28/2022

## 2022-12-28 NOTE — LETTER
12/30/2022      Dorcas Moore  [address] [place] [id number]      Dear Ms. Sanchez        You were seen in the Emergency Department of [address] [place] [address] on 12/28/22 and had lab and/or radiology tests performed. The gonorrhea from your Emergency Department visit on 12/28/22 was positive. You were treated appropriately during your visit. No further treatment is required. If you have not improved or are worsening, please follow up with your primary care doctor or Emergency department as soon as possible. Your partner needs to be treated. If you have any questions please contact the Emergency Department at [phone].       Sincerely,    LIZZIE Soria [facility] EMERGENCY DEPT  [address] [facility] [address] [phone]

## 2022-12-28 NOTE — Clinical Note
[address] EMERGENCY DEPT  [address] [facility] [address] [phone]    Work/School Note    Date: 12/28/2022    To Whom It May concern:      Kortney Villeda was seen and treated today in the emergency room by the following provider(s):  Attending Provider: Adriana Rose MD  Physician Assistant: Mavis Amaya, [address]. Kortney Villeda is excused from work/school on 12/28/22. She is clear to return to work/school on 12/29/22.         Sincerely,          PRISCILLA Khan

## 2024-02-27 NOTE — PROGRESS NOTES
[address] [facility]  Hospitalist Group                                                                                          Hospitalist Progress Note  Mansi Skelton MD  Answering service: [phone] from in house phone        Date of Service:  3/25/2021  NAME:  Naz Trejo  :  1995  MRN:  [id number]      Admission Summary:   Naz Trejo is a 31 y.o. female who presents with abdominal pain    Interval history / Subjective: Follow up hyperglycemia, DKA and new onset DM. Patient seen and examined at the bedside. Labs, images and notes reviewed  Discussed with nursing staff, orders reviewed. Plan discussed with patient/Family  Sicker patient. More acidotic and with mildly open gap overnight on SQ lantus insulin. Feeling ok. No concerns. No vomiting or abdominal pain. No other concerns. Update: Nurse informed about left upper leg normal size boil with serosanguineous discharge. Wound culture requested, topical mupirocin ointment, if needed General surgery consultation. BMP, magnesium and phosphorus checked. K-Phos orally x2. BMP, magnesium, phosphorus check at 11 PM in the morning tomorrow. Assessment & Plan:     Acute DKA, worsening overnight with borderline open gap, and acidosis  New onset diabeteslikely type II  Hypokalemia, still persistent  Hypophosphatemia, rechecking  Hypomagnesemia, repleted  -Insulin GTT transition to SQ lantus on 3/24.  -Uptitrate lantus to 45U daily. -Recheck BMP now and in 4 hrs. -NS 1L bolus IVF  -If not improved with DKA, would consider transition to IV insulin GTT  -Humalog sliding scale insulin. Hold for mealtime at present  -Further adjustment based on serial Accu-Cheks  -KCl for K repletion aggressively and monitor with Q4hrs BMP  -Monitor Mg and Phos closely and replete as necessary  -Cont MIVF with NS at 125ml/hr  -Diabetes education team on board  -Patient will need insulin on discharge.   Would appreciate CM and DM education team input regarding affordability of Lantus  -CHICO antibody requested, not sure whether collected. D/w nursing team to collect 3/25 AM  -CBC, CMP, magnesium, phosphorus monitoring    Nausea/vomiting, better for vomiting, better  -Suspect secondary to acute DKA-acidosis part  -As needed antiemetics  -Monitor  -Continue famotidine    Depression - no SI/HI, monitor     Code status: FULL  DVT prophylaxis: Lovenox     Care Plan discussed with: Patient/Family  Anticipated Disposition: Home w/Family  Anticipated Discharge: 24 hours to 48 hours,      Hospital Problems  Date Reviewed: 1/8/2015          Codes Class Noted POA    [facility] ICD-10-CM: E11.10  ICD-9-CM: 250.12  3/22/2021 Unknown                Review of Systems:   A comprehensive review of systems was negative except for that written in the HPI. Vital Signs:    Last 24hrs VS reviewed since prior progress note. Most recent are:  Visit Vitals  /64   Pulse 87   Temp 98.2 °F (36.8 °C)   Resp 16   Ht 6' (1.829 m)   Wt 124.3 kg (274 lb 1.6 oz)   SpO2 100%   BMI 37.17 kg/m²       No intake or output data in the 24 hours ending 03/25/21 8153     Physical Examination:     I had a face to face encounter with this patient and independently examined them on 3/25/2021 as outlined below:          General:          Alert, cooperative, no distress, appears stated age. Obese patient. BMI 37.17  HEENT:           Atraumatic, anicteric sclerae, pink conjunctivae                          No oral ulcers, mucosa moist, throat clear, dentition fair  Neck:               Supple, symmetrical  Lungs:             Clear to auscultation bilaterally. No Wheezing or Rhonchi. No rales. Chest wall:      No tenderness  No Accessory muscle use. Heart:              Regular  rhythm,  No  murmur   No edema  Abdomen:        Soft, non-tender. Not distended. Bowel sounds normal  Extremities:     No cyanosis.   No clubbing,                            Skin turgor normal, Capillary refill normal  Skin:                Not pale. Not Jaundiced  No rashes   Psych:             Not anxious or agitated. Neurologic:      Alert, moves all extremities, answers questions appropriately and responds to commands            Data Review:    Review and/or order of clinical lab test  Review and/or order of tests in the radiology section of [facility]  Review and/or order of tests in the medicine section of [facility]    Xr Chest Pa Lat    Result Date: 3/22/2021  1. Lungs are clear. 2. There is suspicion of pneumomediastinum. CT may be helpful for further assessment. [facility]    Result Date: 3/22/2021  Echogenic liver which suggests fatty infiltration      Labs:     Recent Labs     03/24/21  0303 03/23/21  2215   WBC 5.3 5.8   HGB 11.6 12.3   HCT 36.8 39.1    175     Recent Labs     03/25/21  0025 03/24/21  1155 03/24/21  0727 03/24/21  0303 03/22/21  1142 03/22/21  1142    137 137 137   < > 138   K 2.9* 3.3* 2.8* 2.7*   < > 4.8   * 110* 111* 112*   < > 109*   CO2 15* 17* 14* 11*   < > 6*   BUN 4* 5* 6 6   < > 9   CREA 0.55 0.68 0.66 0.72   < > 1.01   * 196* 183* 238*   < > 402*   CA 8.1* 8.6 8.8 9.0   < > 8.6   MG 2.1 1.6 1.6 1.6   < >  --    PHOS  --   --   --  1.1*  --  3.0    < > = values in this interval not displayed. Recent Labs     03/22/21  1142   ALT 18      TBILI 0.5   TP 8.8*   ALB 3.7   GLOB 5.1*   LPSE 102     No results for input(s): INR, PTP, APTT, INREXT, INREXT in the last 72 hours. No results for input(s): FE, TIBC, PSAT, FERR in the last 72 hours. No results found for: FOL, RBCF   No results for input(s): PH, PCO2, PO2 in the last 72 hours. No results for input(s): CPK, CKNDX, TROIQ in the last 72 hours.     No lab exists for component: CPKMB  Lab Results   Component Value Date/Time    Cholesterol, total 169 08/06/2020 06:17 AM    HDL Cholesterol 47 08/06/2020 06:17 AM    LDL, calculated 101.4 (H) 08/06/2020 06:17 AM    Triglyceride 103 08/06/2020 06:17 AM    CHOL/HDL Ratio 3.6 08/06/2020 06:17 AM     Lab Results   Component Value Date/Time    Glucose (POC) 209 (H) 03/25/2021 08:23 AM    Glucose (POC) 303 (H) 03/24/2021 09:16 PM    Glucose (POC) 221 (H) 03/24/2021 04:06 PM    Glucose (POC) 221 (H) 03/24/2021 02:01 PM    Glucose (POC) 216 (H) 03/24/2021 01:16 PM     Lab Results   Component Value Date/Time    Color YELLOW/STRAW 03/22/2021 04:20 PM    Appearance CLEAR 03/22/2021 04:20 PM    Specific gravity 1.022 03/22/2021 04:20 PM    pH (UA) 5.0 03/22/2021 04:20 PM    Protein 30 (A) 03/22/2021 04:20 PM    Glucose >1,000 (A) 03/22/2021 04:20 PM    Ketone >80 (A) 03/22/2021 04:20 PM    Bilirubin Negative 03/22/2021 04:20 PM    Urobilinogen 0.2 03/22/2021 04:20 PM    Nitrites Negative 03/22/2021 04:20 PM    Leukocyte Esterase Negative 03/22/2021 04:20 PM    Epithelial cells MODERATE (A) 03/22/2021 04:20 PM    Bacteria Negative 03/22/2021 04:20 PM    WBC 0-4 03/22/2021 04:20 PM    RBC 0-5 03/22/2021 04:20 PM         Medications Reviewed:     Current Facility-Administered Medications   Medication Dose Route Frequency    potassium chloride SR (KLOR-CON 10) tablet 40 mEq  40 mEq Oral Q3H    sodium chloride 0.9 % bolus infusion 1,000 mL  1,000 mL IntraVENous ONCE    insulin glargine (LANTUS) injection 45 Units  45 Units SubCUTAneous DAILY    dextrose (D50W) injection syrg 12.5-25 g  25-50 mL IntraVENous PRN    insulin lispro (HUMALOG)   SubCUTAneous AC&HS    0.9% sodium chloride infusion  125 mL/hr IntraVENous CONTINUOUS    famotidine (PEPCID) tablet 20 mg  20 mg Oral BID    enoxaparin (LOVENOX) injection 40 mg  40 mg SubCUTAneous Q24H    glucose chewable tablet 16 g  4 Tab Oral PRN    glucagon (GLUCAGEN) injection 1 mg  1 mg IntraMUSCular PRN    sodium chloride (NS) flush 5-40 mL  5-40 mL IntraVENous Q8H    sodium chloride (NS) flush 5-40 mL  5-40 mL IntraVENous PRN    acetaminophen (TYLENOL) tablet 650 mg  650 mg Oral Q6H PRN    Or    acetaminophen (TYLENOL) suppository 650 mg  650 mg Rectal Q6H PRN    polyethylene glycol (MIRALAX) packet 17 g  17 g Oral DAILY PRN    promethazine (PHENERGAN) tablet 12.5 mg  12.5 mg Oral Q6H PRN    Or    ondansetron (ZOFRAN) injection 4 mg  4 mg IntraVENous Q6H PRN     ______________________________________________________________________  EXPECTED LENGTH OF STAY: 2d 21h  ACTUAL LENGTH OF STAY:          3                 Lloyd Hough MD yes(specify) [facility]MB MESH LEFT TKR/yes(specify)

## 2024-06-27 ENCOUNTER — TELEPHONE (OUTPATIENT)
Age: 29
End: 2024-06-27

## 2024-06-27 NOTE — TELEPHONE ENCOUNTER
----- Message from Yecenia Stevens sent at 6/27/2024  8:43 AM EDT -----  Regarding: [facility] Appointment Request  [facility] Appointment Request    Patient needs appointment for [facility] Appointment Type: Existing Condition Follow Up.    Reason for Appointment Request: No appointments available during search  --------------------------------------------------------------------------------------------------------------------------    Relationship to Patient: Self     Call Back Information: OK to leave message on voicemail  Preferred Call Back Number: Phone [phone]

## 2025-01-21 ENCOUNTER — APPOINTMENT (OUTPATIENT)
Facility: HOSPITAL | Age: 30
End: 2025-01-21
Payer: MEDICAID

## 2025-01-21 ENCOUNTER — HOSPITAL ENCOUNTER (EMERGENCY)
Facility: HOSPITAL | Age: 30
Discharge: HOME | End: 2025-01-21
Attending: STUDENT IN AN ORGANIZED HEALTH CARE EDUCATION/TRAINING PROGRAM
Payer: MEDICAID

## 2025-01-21 VITALS
WEIGHT: 243.17 LBS | SYSTOLIC BLOOD PRESSURE: 120 MMHG | BODY MASS INDEX: 34.04 KG/M2 | OXYGEN SATURATION: 97 % | HEART RATE: 72 BPM | TEMPERATURE: 98.2 F | DIASTOLIC BLOOD PRESSURE: 72 MMHG | RESPIRATION RATE: 18 BRPM | HEIGHT: 71 IN
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
## 2025-01-21 DIAGNOSIS — T14.8XXA ABRASION: ICD-10-CM

## 2025-01-21 PROCEDURE — 73110 X-RAY EXAM OF WRIST: CPT

## 2025-01-21 PROCEDURE — 99283 EMERGENCY DEPT VISIT LOW MDM: CPT

## 2025-01-21 PROCEDURE — 6370000000 HC RX 637 (ALT 250 FOR IP): Performed by: STUDENT IN AN ORGANIZED HEALTH CARE EDUCATION/TRAINING PROGRAM

## 2025-01-21 RX ORDER — ACETAMINOPHEN 500 MG
1000 TABLET ORAL
Status: COMPLETED | OUTPATIENT
Start: 2025-01-21 | End: 2025-01-21

## 2025-01-21 RX ORDER — IBUPROFEN 600 MG/1
600 TABLET, FILM COATED ORAL 3 TIMES DAILY PRN
Qty: 20 TABLET | Refills: 0 | Status: SHIPPED | OUTPATIENT
Start: 2025-01-21

## 2025-01-21 RX ADMIN — ACETAMINOPHEN 1000 MG: 500 TABLET, FILM COATED ORAL at 11:18

## 2025-01-21 ASSESSMENT — PAIN SCALES - GENERAL: PAINLEVEL_OUTOF10: 8
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
